# Patient Record
Sex: MALE | Race: BLACK OR AFRICAN AMERICAN | NOT HISPANIC OR LATINO
[De-identification: names, ages, dates, MRNs, and addresses within clinical notes are randomized per-mention and may not be internally consistent; named-entity substitution may affect disease eponyms.]

---

## 2017-01-09 ENCOUNTER — OTHER (OUTPATIENT)
Age: 82
End: 2017-01-09

## 2017-01-19 ENCOUNTER — APPOINTMENT (OUTPATIENT)
Dept: HEART AND VASCULAR | Facility: CLINIC | Age: 82
End: 2017-01-19

## 2017-01-19 VITALS
SYSTOLIC BLOOD PRESSURE: 120 MMHG | HEIGHT: 73 IN | WEIGHT: 184 LBS | DIASTOLIC BLOOD PRESSURE: 72 MMHG | BODY MASS INDEX: 24.39 KG/M2 | HEART RATE: 60 BPM

## 2017-01-20 ENCOUNTER — APPOINTMENT (OUTPATIENT)
Dept: HEART AND VASCULAR | Facility: CLINIC | Age: 82
End: 2017-01-20

## 2017-01-20 VITALS
HEART RATE: 60 BPM | WEIGHT: 182 LBS | DIASTOLIC BLOOD PRESSURE: 77 MMHG | BODY MASS INDEX: 24.12 KG/M2 | HEIGHT: 73 IN | SYSTOLIC BLOOD PRESSURE: 134 MMHG

## 2017-01-20 LAB
ALBUMIN SERPL ELPH-MCNC: 4.4 G/DL
ALP BLD-CCNC: 80 U/L
ALT SERPL-CCNC: 15 U/L
ANION GAP SERPL CALC-SCNC: 13 MMOL/L
AST SERPL-CCNC: 26 U/L
BASOPHILS # BLD AUTO: 0.01 K/UL
BASOPHILS NFR BLD AUTO: 0.2 %
BILIRUB SERPL-MCNC: 0.8 MG/DL
BUN SERPL-MCNC: 19 MG/DL
CALCIUM SERPL-MCNC: 9.6 MG/DL
CHLORIDE SERPL-SCNC: 101 MMOL/L
CHOLEST SERPL-MCNC: 229 MG/DL
CHOLEST/HDLC SERPL: 3.1 RATIO
CO2 SERPL-SCNC: 27 MMOL/L
CREAT SERPL-MCNC: 1.44 MG/DL
EOSINOPHIL # BLD AUTO: 0.06 K/UL
EOSINOPHIL NFR BLD AUTO: 1 %
GLUCOSE SERPL-MCNC: 89 MG/DL
HCT VFR BLD CALC: 39.6 %
HDLC SERPL-MCNC: 75 MG/DL
HGB BLD-MCNC: 12.9 G/DL
IMM GRANULOCYTES NFR BLD AUTO: 0.2 %
INR PPP: 2.56 RATIO
LDLC SERPL CALC-MCNC: 131 MG/DL
LYMPHOCYTES # BLD AUTO: 1.66 K/UL
LYMPHOCYTES NFR BLD AUTO: 26.6 %
MAN DIFF?: NORMAL
MCHC RBC-ENTMCNC: 30.9 PG
MCHC RBC-ENTMCNC: 32.6 GM/DL
MCV RBC AUTO: 94.7 FL
MONOCYTES # BLD AUTO: 0.37 K/UL
MONOCYTES NFR BLD AUTO: 5.9 %
NEUTROPHILS # BLD AUTO: 4.13 K/UL
NEUTROPHILS NFR BLD AUTO: 66.1 %
PLATELET # BLD AUTO: 238 K/UL
POTASSIUM SERPL-SCNC: 4.9 MMOL/L
PROT SERPL-MCNC: 8.5 G/DL
PT BLD: 29.2 SEC
RBC # BLD: 4.18 M/UL
RBC # FLD: 14.2 %
SODIUM SERPL-SCNC: 141 MMOL/L
TRIGL SERPL-MCNC: 114 MG/DL
TSH SERPL-ACNC: 2.52 UIU/ML
WBC # FLD AUTO: 6.24 K/UL

## 2017-01-22 LAB — HBA1C MFR BLD HPLC: 5.7 %

## 2017-01-27 ENCOUNTER — OTHER (OUTPATIENT)
Age: 82
End: 2017-01-27

## 2017-06-07 ENCOUNTER — APPOINTMENT (OUTPATIENT)
Dept: ORTHOPEDIC SURGERY | Facility: CLINIC | Age: 82
End: 2017-06-07

## 2017-06-07 VITALS — HEIGHT: 73 IN | BODY MASS INDEX: 24.12 KG/M2 | WEIGHT: 182 LBS

## 2017-06-07 DIAGNOSIS — M25.471 EFFUSION, RIGHT ANKLE: ICD-10-CM

## 2017-06-08 ENCOUNTER — APPOINTMENT (OUTPATIENT)
Dept: HEART AND VASCULAR | Facility: CLINIC | Age: 82
End: 2017-06-08

## 2017-06-08 VITALS
HEIGHT: 73 IN | DIASTOLIC BLOOD PRESSURE: 80 MMHG | SYSTOLIC BLOOD PRESSURE: 120 MMHG | BODY MASS INDEX: 23.86 KG/M2 | WEIGHT: 180 LBS | HEART RATE: 59 BPM

## 2017-06-13 ENCOUNTER — APPOINTMENT (OUTPATIENT)
Dept: HEART AND VASCULAR | Facility: CLINIC | Age: 82
End: 2017-06-13

## 2017-06-13 VITALS
HEART RATE: 59 BPM | WEIGHT: 182 LBS | HEIGHT: 73 IN | DIASTOLIC BLOOD PRESSURE: 79 MMHG | BODY MASS INDEX: 24.12 KG/M2 | SYSTOLIC BLOOD PRESSURE: 125 MMHG

## 2017-06-14 ENCOUNTER — OTHER (OUTPATIENT)
Age: 82
End: 2017-06-14

## 2017-10-30 ENCOUNTER — APPOINTMENT (OUTPATIENT)
Dept: HEART AND VASCULAR | Facility: CLINIC | Age: 82
End: 2017-10-30
Payer: MEDICARE

## 2017-10-30 VITALS
SYSTOLIC BLOOD PRESSURE: 128 MMHG | DIASTOLIC BLOOD PRESSURE: 82 MMHG | BODY MASS INDEX: 22.82 KG/M2 | HEART RATE: 59 BPM | WEIGHT: 173 LBS

## 2017-10-30 PROCEDURE — 99213 OFFICE O/P EST LOW 20 MIN: CPT | Mod: 25

## 2017-10-30 PROCEDURE — 93000 ELECTROCARDIOGRAM COMPLETE: CPT

## 2017-10-30 PROCEDURE — G0008: CPT

## 2017-10-30 PROCEDURE — 90662 IIV NO PRSV INCREASED AG IM: CPT

## 2017-10-30 PROCEDURE — 36415 COLL VENOUS BLD VENIPUNCTURE: CPT

## 2017-10-30 RX ORDER — SULFAMETHOXAZOLE AND TRIMETHOPRIM 800; 160 MG/1; MG/1
800-160 TABLET ORAL
Qty: 14 | Refills: 0 | Status: DISCONTINUED | COMMUNITY
Start: 2017-05-10

## 2017-10-30 RX ORDER — PREDNISONE 20 MG/1
20 TABLET ORAL
Qty: 6 | Refills: 0 | Status: DISCONTINUED | COMMUNITY
Start: 2017-05-14

## 2017-10-31 ENCOUNTER — OTHER (OUTPATIENT)
Age: 82
End: 2017-10-31

## 2017-10-31 LAB
ANION GAP SERPL CALC-SCNC: 17 MMOL/L
BASOPHILS # BLD AUTO: 0.02 K/UL
BASOPHILS NFR BLD AUTO: 0.4 %
BUN SERPL-MCNC: 24 MG/DL
CALCIUM SERPL-MCNC: 9.5 MG/DL
CHLORIDE SERPL-SCNC: 103 MMOL/L
CHOLEST SERPL-MCNC: 220 MG/DL
CHOLEST/HDLC SERPL: 2.8 RATIO
CO2 SERPL-SCNC: 24 MMOL/L
CREAT SERPL-MCNC: 1.55 MG/DL
EOSINOPHIL # BLD AUTO: 0.11 K/UL
EOSINOPHIL NFR BLD AUTO: 2.1 %
GLUCOSE SERPL-MCNC: 112 MG/DL
HBA1C MFR BLD HPLC: 5.6 %
HCT VFR BLD CALC: 40 %
HDLC SERPL-MCNC: 78 MG/DL
HGB BLD-MCNC: 12.6 G/DL
IMM GRANULOCYTES NFR BLD AUTO: 0 %
INR PPP: 1.96 RATIO
LDLC SERPL CALC-MCNC: 123 MG/DL
LYMPHOCYTES # BLD AUTO: 1.24 K/UL
LYMPHOCYTES NFR BLD AUTO: 23.8 %
MAN DIFF?: NORMAL
MCHC RBC-ENTMCNC: 30.1 PG
MCHC RBC-ENTMCNC: 31.5 GM/DL
MCV RBC AUTO: 95.5 FL
MONOCYTES # BLD AUTO: 0.4 K/UL
MONOCYTES NFR BLD AUTO: 7.7 %
NEUTROPHILS # BLD AUTO: 3.44 K/UL
NEUTROPHILS NFR BLD AUTO: 66 %
PLATELET # BLD AUTO: 207 K/UL
POTASSIUM SERPL-SCNC: 4.4 MMOL/L
PT BLD: 22.5 SEC
RBC # BLD: 4.19 M/UL
RBC # FLD: 15.1 %
SODIUM SERPL-SCNC: 144 MMOL/L
TRIGL SERPL-MCNC: 97 MG/DL
TSH SERPL-ACNC: 2.73 UIU/ML
WBC # FLD AUTO: 5.21 K/UL

## 2017-12-12 ENCOUNTER — APPOINTMENT (OUTPATIENT)
Dept: HEART AND VASCULAR | Facility: CLINIC | Age: 82
End: 2017-12-12
Payer: MEDICARE

## 2017-12-12 VITALS
HEART RATE: 60 BPM | WEIGHT: 175 LBS | BODY MASS INDEX: 23.19 KG/M2 | HEIGHT: 73 IN | SYSTOLIC BLOOD PRESSURE: 131 MMHG | DIASTOLIC BLOOD PRESSURE: 76 MMHG

## 2017-12-12 PROCEDURE — 93280 PM DEVICE PROGR EVAL DUAL: CPT

## 2017-12-12 PROCEDURE — 99214 OFFICE O/P EST MOD 30 MIN: CPT | Mod: 25

## 2018-01-17 ENCOUNTER — APPOINTMENT (OUTPATIENT)
Dept: HEART AND VASCULAR | Facility: CLINIC | Age: 83
End: 2018-01-17
Payer: MEDICARE

## 2018-01-17 PROCEDURE — 36415 COLL VENOUS BLD VENIPUNCTURE: CPT

## 2018-01-18 LAB
INR PPP: 2.95 RATIO
PT BLD: 34.1 SEC

## 2018-03-06 ENCOUNTER — APPOINTMENT (OUTPATIENT)
Dept: HEART AND VASCULAR | Facility: CLINIC | Age: 83
End: 2018-03-06
Payer: MEDICARE

## 2018-03-06 VITALS
SYSTOLIC BLOOD PRESSURE: 122 MMHG | DIASTOLIC BLOOD PRESSURE: 90 MMHG | WEIGHT: 180 LBS | HEIGHT: 73 IN | HEART RATE: 60 BPM | BODY MASS INDEX: 23.86 KG/M2

## 2018-03-06 VITALS — SYSTOLIC BLOOD PRESSURE: 132 MMHG | DIASTOLIC BLOOD PRESSURE: 84 MMHG

## 2018-03-06 PROCEDURE — 93000 ELECTROCARDIOGRAM COMPLETE: CPT

## 2018-03-06 PROCEDURE — 93978 VASCULAR STUDY: CPT

## 2018-03-06 PROCEDURE — 99214 OFFICE O/P EST MOD 30 MIN: CPT | Mod: 25

## 2018-03-06 PROCEDURE — 36415 COLL VENOUS BLD VENIPUNCTURE: CPT

## 2018-03-08 LAB
INR PPP: 2.12 RATIO
PT BLD: 24.3 SEC

## 2018-05-11 ENCOUNTER — APPOINTMENT (OUTPATIENT)
Dept: HEART AND VASCULAR | Facility: CLINIC | Age: 83
End: 2018-05-11
Payer: MEDICARE

## 2018-05-11 PROCEDURE — 36415 COLL VENOUS BLD VENIPUNCTURE: CPT

## 2018-05-14 LAB
INR PPP: 2.52 RATIO
PT BLD: 29 SEC

## 2018-05-15 ENCOUNTER — APPOINTMENT (OUTPATIENT)
Dept: HEART AND VASCULAR | Facility: CLINIC | Age: 83
End: 2018-05-15
Payer: MEDICARE

## 2018-05-15 ENCOUNTER — LABORATORY RESULT (OUTPATIENT)
Age: 83
End: 2018-05-15

## 2018-05-15 VITALS
SYSTOLIC BLOOD PRESSURE: 132 MMHG | HEIGHT: 72 IN | WEIGHT: 175 LBS | BODY MASS INDEX: 23.7 KG/M2 | HEART RATE: 54 BPM | DIASTOLIC BLOOD PRESSURE: 72 MMHG

## 2018-05-15 DIAGNOSIS — Z01.818 ENCOUNTER FOR OTHER PREPROCEDURAL EXAMINATION: ICD-10-CM

## 2018-05-15 LAB
BASOPHILS # BLD AUTO: 0.02 K/UL
BASOPHILS NFR BLD AUTO: 0.4 %
EOSINOPHIL # BLD AUTO: 0.06 K/UL
EOSINOPHIL NFR BLD AUTO: 1.2 %
HCT VFR BLD CALC: 38.1 %
HGB BLD-MCNC: 12.3 G/DL
IMM GRANULOCYTES NFR BLD AUTO: 0.2 %
LYMPHOCYTES # BLD AUTO: 1.45 K/UL
LYMPHOCYTES NFR BLD AUTO: 28.9 %
MAN DIFF?: NORMAL
MCHC RBC-ENTMCNC: 30.2 PG
MCHC RBC-ENTMCNC: 32.3 GM/DL
MCV RBC AUTO: 93.6 FL
MONOCYTES # BLD AUTO: 0.47 K/UL
MONOCYTES NFR BLD AUTO: 9.4 %
NEUTROPHILS # BLD AUTO: 3.01 K/UL
NEUTROPHILS NFR BLD AUTO: 59.9 %
PLATELET # BLD AUTO: 196 K/UL
RBC # BLD: 4.07 M/UL
RBC # FLD: 14.4 %
WBC # FLD AUTO: 5.02 K/UL

## 2018-05-15 PROCEDURE — 99215 OFFICE O/P EST HI 40 MIN: CPT | Mod: 25

## 2018-05-15 PROCEDURE — 93280 PM DEVICE PROGR EVAL DUAL: CPT

## 2018-05-16 ENCOUNTER — OUTPATIENT (OUTPATIENT)
Dept: OUTPATIENT SERVICES | Facility: HOSPITAL | Age: 83
LOS: 1 days | Discharge: HOME | End: 2018-05-16
Payer: MEDICARE

## 2018-05-16 LAB
INR BLD: 2.87 — HIGH (ref 0.88–1.16)
PROTHROM AB SERPL-ACNC: 32.6 SEC — HIGH (ref 9.8–12.7)

## 2018-05-16 PROCEDURE — C1785: CPT

## 2018-05-16 PROCEDURE — 33228 REMV&REPLC PM GEN DUAL LEAD: CPT | Mod: KX

## 2018-05-16 PROCEDURE — 36415 COLL VENOUS BLD VENIPUNCTURE: CPT

## 2018-05-16 PROCEDURE — 33228 REMV&REPLC PM GEN DUAL LEAD: CPT

## 2018-05-16 PROCEDURE — 85610 PROTHROMBIN TIME: CPT

## 2018-05-16 RX ORDER — CEFAZOLIN SODIUM 1 G
1000 VIAL (EA) INJECTION ONCE
Qty: 0 | Refills: 0 | Status: COMPLETED | OUTPATIENT
Start: 2018-05-16 | End: 2018-05-16

## 2018-05-16 RX ADMIN — Medication 100 MILLIGRAM(S): at 13:10

## 2018-05-17 ENCOUNTER — APPOINTMENT (OUTPATIENT)
Dept: HEART AND VASCULAR | Facility: CLINIC | Age: 83
End: 2018-05-17

## 2018-06-19 ENCOUNTER — APPOINTMENT (OUTPATIENT)
Dept: HEART AND VASCULAR | Facility: CLINIC | Age: 83
End: 2018-06-19
Payer: MEDICARE

## 2018-06-19 VITALS
BODY MASS INDEX: 23.7 KG/M2 | HEIGHT: 72 IN | DIASTOLIC BLOOD PRESSURE: 91 MMHG | HEART RATE: 60 BPM | SYSTOLIC BLOOD PRESSURE: 163 MMHG | WEIGHT: 175 LBS

## 2018-06-19 PROCEDURE — 93280 PM DEVICE PROGR EVAL DUAL: CPT

## 2018-06-22 ENCOUNTER — APPOINTMENT (OUTPATIENT)
Dept: HEART AND VASCULAR | Facility: CLINIC | Age: 83
End: 2018-06-22

## 2018-07-24 ENCOUNTER — APPOINTMENT (OUTPATIENT)
Dept: HEART AND VASCULAR | Facility: CLINIC | Age: 83
End: 2018-07-24

## 2018-09-24 ENCOUNTER — RX RENEWAL (OUTPATIENT)
Age: 83
End: 2018-09-24

## 2018-11-15 ENCOUNTER — APPOINTMENT (OUTPATIENT)
Dept: HEART AND VASCULAR | Facility: CLINIC | Age: 83
End: 2018-11-15
Payer: MEDICARE

## 2018-11-15 ENCOUNTER — NON-APPOINTMENT (OUTPATIENT)
Age: 83
End: 2018-11-15

## 2018-11-15 VITALS
HEIGHT: 72 IN | WEIGHT: 169 LBS | DIASTOLIC BLOOD PRESSURE: 88 MMHG | BODY MASS INDEX: 22.89 KG/M2 | SYSTOLIC BLOOD PRESSURE: 144 MMHG

## 2018-11-15 VITALS — HEART RATE: 60 BPM

## 2018-11-15 DIAGNOSIS — R40.4 TRANSIENT ALTERATION OF AWARENESS: ICD-10-CM

## 2018-11-15 DIAGNOSIS — K52.9 NONINFECTIVE GASTROENTERITIS AND COLITIS, UNSPECIFIED: ICD-10-CM

## 2018-11-15 PROCEDURE — 93306 TTE W/DOPPLER COMPLETE: CPT

## 2018-11-15 PROCEDURE — 36415 COLL VENOUS BLD VENIPUNCTURE: CPT

## 2018-11-15 PROCEDURE — 99214 OFFICE O/P EST MOD 30 MIN: CPT | Mod: 25

## 2018-11-15 PROCEDURE — 93000 ELECTROCARDIOGRAM COMPLETE: CPT

## 2018-11-18 PROBLEM — R40.4 ALTERED AWARENESS, TRANSIENT: Status: ACTIVE | Noted: 2018-11-18

## 2018-11-18 NOTE — PHYSICAL EXAM
[General Appearance - Well Developed] : well developed [Normal Appearance] : normal appearance [Well Groomed] : well groomed [General Appearance - Well Nourished] : well nourished [No Deformities] : no deformities [General Appearance - In No Acute Distress] : no acute distress [Normal Conjunctiva] : the conjunctiva exhibited no abnormalities [Eyelids - No Xanthelasma] : the eyelids demonstrated no xanthelasmas [Normal Oral Mucosa] : normal oral mucosa [No Oral Pallor] : no oral pallor [No Oral Cyanosis] : no oral cyanosis [Normal Jugular Venous A Waves Present] : normal jugular venous A waves present [Normal Jugular Venous V Waves Present] : normal jugular venous V waves present [No Jugular Venous Jensen A Waves] : no jugular venous jensen A waves [Respiration, Rhythm And Depth] : normal respiratory rhythm and effort [Exaggerated Use Of Accessory Muscles For Inspiration] : no accessory muscle use [Auscultation Breath Sounds / Voice Sounds] : lungs were clear to auscultation bilaterally [Heart Rate And Rhythm] : heart rate and rhythm were normal [Heart Sounds] : normal S1 and S2 [Arterial Pulses Normal] : the arterial pulses were normal [Abdomen Soft] : soft [Abdomen Tenderness] : non-tender [Abdomen Mass (___ Cm)] : no abdominal mass palpated [Abnormal Walk] : normal gait [Gait - Sufficient For Exercise Testing] : the gait was sufficient for exercise testing [Nail Clubbing] : no clubbing of the fingernails [Cyanosis, Localized] : no localized cyanosis [Petechial Hemorrhages (___cm)] : no petechial hemorrhages [Skin Color & Pigmentation] : normal skin color and pigmentation [] : no rash [No Venous Stasis] : no venous stasis [No Xanthoma] : no  xanthoma was observed [FreeTextEntry1] : One times 1 abrasion on right shin [Oriented To Time, Place, And Person] : oriented to person, place, and time [Affect] : the affect was normal [Mood] : the mood was normal [No Anxiety] : not feeling anxious

## 2018-11-18 NOTE — DISCUSSION/SUMMARY
[FreeTextEntry1] : The patient has atrial fibrillation. His heart rate is controlled. He is anticoagulated. He has occasional lightheadedness associated with a bowel movement. This is not cardiac. His blood pressure has been elevated. He may be having a reaction to lisinopril. He will change and take Avapro. His pacemaker was replaced and is functioning normally.

## 2018-11-18 NOTE — HISTORY OF PRESENT ILLNESS
[FreeTextEntry1] : The patient has dizziness before moving his bowels. His occasional mild and has occurred for the last 6 days. He has 3 bowel movements a day but no diarrhea. It is light brown in color. He has not had a fever. Since May he clears his throat of yellow phlegm in the morning. This is better with an antihistamine. His pacemaker was replaced.

## 2018-11-20 LAB
ANION GAP SERPL CALC-SCNC: 12 MMOL/L
BASOPHILS # BLD AUTO: 0.02 K/UL
BASOPHILS NFR BLD AUTO: 0.4 %
BUN SERPL-MCNC: 23 MG/DL
CALCIUM SERPL-MCNC: 9.8 MG/DL
CHLORIDE SERPL-SCNC: 103 MMOL/L
CHOLEST SERPL-MCNC: 234 MG/DL
CHOLEST/HDLC SERPL: 2.9 RATIO
CO2 SERPL-SCNC: 25 MMOL/L
CREAT SERPL-MCNC: 1.43 MG/DL
EOSINOPHIL # BLD AUTO: 0.08 K/UL
EOSINOPHIL NFR BLD AUTO: 1.5 %
GLUCOSE SERPL-MCNC: 89 MG/DL
HBA1C MFR BLD HPLC: 5.6 %
HCT VFR BLD CALC: 41.8 %
HDLC SERPL-MCNC: 81 MG/DL
HGB BLD-MCNC: 13.1 G/DL
IMM GRANULOCYTES NFR BLD AUTO: 0.4 %
INR PPP: 2.49 RATIO
LDLC SERPL CALC-MCNC: 137 MG/DL
LYMPHOCYTES # BLD AUTO: 1.59 K/UL
LYMPHOCYTES NFR BLD AUTO: 29 %
MAN DIFF?: NORMAL
MCHC RBC-ENTMCNC: 30.3 PG
MCHC RBC-ENTMCNC: 31.3 GM/DL
MCV RBC AUTO: 96.5 FL
MONOCYTES # BLD AUTO: 0.38 K/UL
MONOCYTES NFR BLD AUTO: 6.9 %
NEUTROPHILS # BLD AUTO: 3.39 K/UL
NEUTROPHILS NFR BLD AUTO: 61.8 %
PLATELET # BLD AUTO: 219 K/UL
POTASSIUM SERPL-SCNC: 5 MMOL/L
PT BLD: 28.7 SEC
RBC # BLD: 4.33 M/UL
RBC # FLD: 14.2 %
SODIUM SERPL-SCNC: 140 MMOL/L
TRIGL SERPL-MCNC: 81 MG/DL
TSH SERPL-ACNC: 3.05 UIU/ML
WBC # FLD AUTO: 5.48 K/UL

## 2018-12-04 ENCOUNTER — APPOINTMENT (OUTPATIENT)
Dept: HEART AND VASCULAR | Facility: CLINIC | Age: 83
End: 2018-12-04
Payer: MEDICARE

## 2018-12-04 VITALS
WEIGHT: 180 LBS | HEART RATE: 60 BPM | BODY MASS INDEX: 24.38 KG/M2 | HEIGHT: 72 IN | DIASTOLIC BLOOD PRESSURE: 65 MMHG | SYSTOLIC BLOOD PRESSURE: 117 MMHG

## 2018-12-04 PROCEDURE — 93280 PM DEVICE PROGR EVAL DUAL: CPT

## 2018-12-04 PROCEDURE — 99213 OFFICE O/P EST LOW 20 MIN: CPT | Mod: 25

## 2018-12-06 NOTE — HISTORY OF PRESENT ILLNESS
[None] : The patient complains of no symptoms [Palpitations] : no palpitations [SOB] : no dyspnea [Syncope] : no syncope [Dizziness] : no dizziness [Chest Pain] : no chest pain or discomfort [Shoulder Pain] : no shoulder pain [Pain at Site] : no pain at device site [Erythema at Site] : no erythema at device site [Swelling at Site] : no swelling at device site [de-identified] : 82 yo man with permanent Afib (on Coumadin), HTN, CKD and AV block S/P PPM (original implant 2000, generator change 2/9/09), presents for device follow-up after his second generator change on 5/16/18. He denies device related complaints. He has been pacer dependent in that he paces 100% of the time but there is a slow ventricular escape in the 40's with inhibition. EF has been preserved.  \par \par INRs managed by Dr. Minaya.

## 2018-12-06 NOTE — PHYSICAL EXAM
[General Appearance - Well Developed] : well developed [Normal Appearance] : normal appearance [Well Groomed] : well groomed [General Appearance - Well Nourished] : well nourished [No Deformities] : no deformities [General Appearance - In No Acute Distress] : no acute distress [Heart Rate And Rhythm] : heart rate and rhythm were normal [Heart Sounds] : normal S1 and S2 [Murmurs] : no murmurs present [Respiration, Rhythm And Depth] : normal respiratory rhythm and effort [Exaggerated Use Of Accessory Muscles For Inspiration] : no accessory muscle use [Auscultation Breath Sounds / Voice Sounds] : lungs were clear to auscultation bilaterally [Left Infraclavicular] : left infraclavicular area [Clean] : clean [Dry] : dry [Well-Healed] : well-healed [Abdomen Soft] : soft [Abdomen Tenderness] : non-tender [Abdomen Mass (___ Cm)] : no abdominal mass palpated [Nail Clubbing] : no clubbing of the fingernails [Cyanosis, Localized] : no localized cyanosis [Petechial Hemorrhages (___cm)] : no petechial hemorrhages [] : no ischemic changes [Palpable Crepitus] : no palpable crepitus [Bleeding] : no active bleeding [Foul Odor] : no foul smell [Purulent Drainage] : no purulent drainage [Serosanguineous Drainage] : no serosanquineous drainage [Serous Drainage] : no serous drainage [Erythema] : not erythematous [Warm] : not warm [Tender] : not tender [Indurated] : not indurated [Fluctuant] : not fluctuant

## 2018-12-06 NOTE — PROCEDURE
[Atrial Fibrillation] : atrial fibrillation [Pacemaker] : pacemaker [[employer]] : [employer] [DDI] : DDI [Voltage: ___ volts] : Voltage was [unfilled] volts [Impedance: ___ Ohms] : current cell impedance is [unfilled] Ohms [Threshold Testing Performed] : Threshold testing was performed [Lead Imp:  ___ohms] : lead impedance was [unfilled] ohms [Sensing Amplitude ___mv] : sensing amplitude was [unfilled] mv [___V @] : [unfilled] V [___ ms] : [unfilled] ms [None] : none [de-identified] : complete heart block with ventricular escape ~ 35 bpm [de-identified] : KOSTA [de-identified] : Assurity MRI [de-identified] : [id number] [de-identified] : 5/16/2018 [de-identified] : 61 [de-identified] : 9.6-10.1 years longevity  [de-identified] : >99% AT/AF\par  97%

## 2019-01-17 ENCOUNTER — APPOINTMENT (OUTPATIENT)
Dept: HEART AND VASCULAR | Facility: CLINIC | Age: 84
End: 2019-01-17
Payer: MEDICARE

## 2019-01-17 PROCEDURE — 36415 COLL VENOUS BLD VENIPUNCTURE: CPT

## 2019-01-18 LAB
INR PPP: 3.12 RATIO
PT BLD: 36.2 SEC

## 2019-04-19 ENCOUNTER — APPOINTMENT (OUTPATIENT)
Dept: INTERNAL MEDICINE | Facility: CLINIC | Age: 84
End: 2019-04-19

## 2019-05-02 ENCOUNTER — APPOINTMENT (OUTPATIENT)
Dept: HEART AND VASCULAR | Facility: CLINIC | Age: 84
End: 2019-05-02
Payer: MEDICARE

## 2019-05-02 ENCOUNTER — NON-APPOINTMENT (OUTPATIENT)
Age: 84
End: 2019-05-02

## 2019-05-02 VITALS
BODY MASS INDEX: 24.65 KG/M2 | OXYGEN SATURATION: 96 % | DIASTOLIC BLOOD PRESSURE: 80 MMHG | WEIGHT: 182 LBS | SYSTOLIC BLOOD PRESSURE: 112 MMHG | HEIGHT: 72 IN | HEART RATE: 66 BPM

## 2019-05-02 DIAGNOSIS — R09.89 OTHER SPECIFIED SYMPTOMS AND SIGNS INVOLVING THE CIRCULATORY AND RESPIRATORY SYSTEMS: ICD-10-CM

## 2019-05-02 PROCEDURE — 93000 ELECTROCARDIOGRAM COMPLETE: CPT

## 2019-05-02 PROCEDURE — 99214 OFFICE O/P EST MOD 30 MIN: CPT | Mod: 25

## 2019-05-02 PROCEDURE — 93880 EXTRACRANIAL BILAT STUDY: CPT

## 2019-05-02 PROCEDURE — 36415 COLL VENOUS BLD VENIPUNCTURE: CPT

## 2019-05-02 RX ORDER — AZITHROMYCIN 500 MG/1
500 TABLET, FILM COATED ORAL DAILY
Qty: 3 | Refills: 0 | Status: DISCONTINUED | COMMUNITY
Start: 2018-11-15 | End: 2019-05-02

## 2019-05-03 ENCOUNTER — RX RENEWAL (OUTPATIENT)
Age: 84
End: 2019-05-03

## 2019-05-03 ENCOUNTER — RESULT REVIEW (OUTPATIENT)
Age: 84
End: 2019-05-03

## 2019-05-03 LAB
INR PPP: 2.39 RATIO
PT BLD: 27.7 SEC

## 2019-05-03 NOTE — HISTORY OF PRESENT ILLNESS
[FreeTextEntry1] : The patient [place] subway [place]. This mild occasional and resolves with rest. He walks on the treadmill slowly to 30 minutes. He walks a mile outdoors without difficulty. He has epigastric discomfort 15 minutes after eating.

## 2019-05-03 NOTE — PHYSICAL EXAM
[General Appearance - Well Developed] : well developed [Well Groomed] : well groomed [Normal Appearance] : normal appearance [General Appearance - Well Nourished] : well nourished [No Deformities] : no deformities [General Appearance - In No Acute Distress] : no acute distress [Normal Conjunctiva] : the conjunctiva exhibited no abnormalities [Eyelids - No Xanthelasma] : the eyelids demonstrated no xanthelasmas [Normal Oral Mucosa] : normal oral mucosa [No Oral Pallor] : no oral pallor [No Oral Cyanosis] : no oral cyanosis [Normal Jugular Venous V Waves Present] : normal jugular venous V waves present [Normal Jugular Venous A Waves Present] : normal jugular venous A waves present [No Jugular Venous Jensen A Waves] : no jugular venous jensen A waves [Respiration, Rhythm And Depth] : normal respiratory rhythm and effort [Exaggerated Use Of Accessory Muscles For Inspiration] : no accessory muscle use [Auscultation Breath Sounds / Voice Sounds] : lungs were clear to auscultation bilaterally [Heart Rate And Rhythm] : heart rate and rhythm were normal [Heart Sounds] : normal S1 and S2 [Arterial Pulses Normal] : the arterial pulses were normal [FreeTextEntry1] : 2/6 Systolic ejection murmur, harsh/blowing base to apex. right carotid bruit versus murmur. [Abdomen Soft] : soft [Abdomen Tenderness] : non-tender [Abdomen Mass (___ Cm)] : no abdominal mass palpated [Abnormal Walk] : normal gait [Gait - Sufficient For Exercise Testing] : the gait was sufficient for exercise testing [Nail Clubbing] : no clubbing of the fingernails [Cyanosis, Localized] : no localized cyanosis [Petechial Hemorrhages (___cm)] : no petechial hemorrhages [Skin Color & Pigmentation] : normal skin color and pigmentation [] : no rash [No Venous Stasis] : no venous stasis [No Xanthoma] : no  xanthoma was observed [Oriented To Time, Place, And Person] : oriented to person, place, and time [Affect] : the affect was normal [Mood] : the mood was normal [No Anxiety] : not feeling anxious

## 2019-05-03 NOTE — DISCUSSION/SUMMARY
[FreeTextEntry1] : The patient has mild dyspnea on exertion. His EKG shows atrial fibrillation and ventricular pacing. He has a carotid bruit. His carotid Doppler is normal. His prior studies did not show significant structural heart disease. The patient is deconditioned. He will increase his exercise. He will continue his current medication.

## 2019-06-04 ENCOUNTER — APPOINTMENT (OUTPATIENT)
Dept: HEART AND VASCULAR | Facility: CLINIC | Age: 84
End: 2019-06-04

## 2019-06-04 ENCOUNTER — RECORD ABSTRACTING (OUTPATIENT)
Age: 84
End: 2019-06-04

## 2019-06-04 VITALS — DIASTOLIC BLOOD PRESSURE: 100 MMHG | SYSTOLIC BLOOD PRESSURE: 132 MMHG | HEART RATE: 54 BPM

## 2019-07-25 ENCOUNTER — RESULT REVIEW (OUTPATIENT)
Age: 84
End: 2019-07-25

## 2019-08-06 ENCOUNTER — APPOINTMENT (OUTPATIENT)
Dept: HEART AND VASCULAR | Facility: CLINIC | Age: 84
End: 2019-08-06
Payer: MEDICARE

## 2019-08-06 VITALS
HEART RATE: 65 BPM | HEIGHT: 72 IN | WEIGHT: 179 LBS | SYSTOLIC BLOOD PRESSURE: 150 MMHG | BODY MASS INDEX: 24.24 KG/M2 | DIASTOLIC BLOOD PRESSURE: 80 MMHG

## 2019-08-06 DIAGNOSIS — I47.2 VENTRICULAR TACHYCARDIA: ICD-10-CM

## 2019-08-06 PROCEDURE — 93280 PM DEVICE PROGR EVAL DUAL: CPT

## 2019-08-06 PROCEDURE — 99213 OFFICE O/P EST LOW 20 MIN: CPT | Mod: 25

## 2019-08-09 PROBLEM — I47.2 NSVT (NONSUSTAINED VENTRICULAR TACHYCARDIA): Status: ACTIVE | Noted: 2019-08-09

## 2019-08-09 NOTE — PHYSICAL EXAM
[General Appearance - Well Developed] : well developed [Well Groomed] : well groomed [Normal Appearance] : normal appearance [General Appearance - Well Nourished] : well nourished [No Deformities] : no deformities [General Appearance - In No Acute Distress] : no acute distress [Heart Rate And Rhythm] : heart rate and rhythm were normal [Heart Sounds] : normal S1 and S2 [Murmurs] : no murmurs present [Respiration, Rhythm And Depth] : normal respiratory rhythm and effort [Exaggerated Use Of Accessory Muscles For Inspiration] : no accessory muscle use [Auscultation Breath Sounds / Voice Sounds] : lungs were clear to auscultation bilaterally [Left Infraclavicular] : left infraclavicular area [Clean] : clean [Dry] : dry [Well-Healed] : well-healed [Abdomen Soft] : soft [Abdomen Tenderness] : non-tender [Abdomen Mass (___ Cm)] : no abdominal mass palpated [Nail Clubbing] : no clubbing of the fingernails [Petechial Hemorrhages (___cm)] : no petechial hemorrhages [Cyanosis, Localized] : no localized cyanosis [] : no ischemic changes [Palpable Crepitus] : no palpable crepitus [Bleeding] : no active bleeding [Foul Odor] : no foul smell [Purulent Drainage] : no purulent drainage [Serosanguineous Drainage] : no serosanquineous drainage [Serous Drainage] : no serous drainage [Warm] : not warm [Erythema] : not erythematous [Tender] : not tender [Indurated] : not indurated [Fluctuant] : not fluctuant

## 2019-08-09 NOTE — PROCEDURE
[Atrial Fibrillation] : atrial fibrillation [Pacemaker] : pacemaker [[employer]] : [employer] [DDI] : DDI [Voltage: ___ volts] : Voltage was [unfilled] volts [Impedance: ___ Ohms] : current cell impedance is [unfilled] Ohms [Threshold Testing Performed] : Threshold testing was performed [Lead Imp:  ___ohms] : lead impedance was [unfilled] ohms [Sensing Amplitude ___mv] : sensing amplitude was [unfilled] mv [___V @] : [unfilled] V [___ ms] : [unfilled] ms [None] : none [de-identified] : KOSTA [de-identified] : complete heart block with ventricular escape ~ 35 bpm [de-identified] : [id number] [de-identified] : Assurity MRI [de-identified] : 5/16/2018 [de-identified] : 61 [de-identified] : 9.6-10.1 years longevity  [de-identified] : >99% AT/AF\par  95%\par NSVT 1 event - 3/26/19 - 19 beats @ rate of ~ 190 bpm

## 2019-08-12 ENCOUNTER — RX RENEWAL (OUTPATIENT)
Age: 84
End: 2019-08-12

## 2019-09-11 ENCOUNTER — RX RENEWAL (OUTPATIENT)
Age: 84
End: 2019-09-11

## 2019-09-26 ENCOUNTER — RESULT REVIEW (OUTPATIENT)
Age: 84
End: 2019-09-26

## 2019-11-11 ENCOUNTER — LABORATORY RESULT (OUTPATIENT)
Age: 84
End: 2019-11-11

## 2019-11-11 ENCOUNTER — APPOINTMENT (OUTPATIENT)
Dept: HEART AND VASCULAR | Facility: CLINIC | Age: 84
End: 2019-11-11
Payer: MEDICARE

## 2019-11-11 PROCEDURE — 36415 COLL VENOUS BLD VENIPUNCTURE: CPT

## 2019-11-13 ENCOUNTER — RESULT REVIEW (OUTPATIENT)
Age: 84
End: 2019-11-13

## 2019-11-20 ENCOUNTER — APPOINTMENT (OUTPATIENT)
Dept: HEART AND VASCULAR | Facility: CLINIC | Age: 84
End: 2019-11-20
Payer: MEDICARE

## 2019-11-20 ENCOUNTER — NON-APPOINTMENT (OUTPATIENT)
Age: 84
End: 2019-11-20

## 2019-11-20 VITALS
OXYGEN SATURATION: 96 % | DIASTOLIC BLOOD PRESSURE: 90 MMHG | WEIGHT: 187 LBS | BODY MASS INDEX: 25.33 KG/M2 | HEART RATE: 60 BPM | HEIGHT: 72 IN | SYSTOLIC BLOOD PRESSURE: 124 MMHG

## 2019-11-20 PROCEDURE — 93000 ELECTROCARDIOGRAM COMPLETE: CPT

## 2019-11-20 PROCEDURE — 93306 TTE W/DOPPLER COMPLETE: CPT

## 2019-11-20 PROCEDURE — 99214 OFFICE O/P EST MOD 30 MIN: CPT | Mod: 25

## 2019-11-20 NOTE — PHYSICAL EXAM
[General Appearance - Well Developed] : well developed [Well Groomed] : well groomed [Normal Appearance] : normal appearance [General Appearance - Well Nourished] : well nourished [No Deformities] : no deformities [General Appearance - In No Acute Distress] : no acute distress [Eyelids - No Xanthelasma] : the eyelids demonstrated no xanthelasmas [Normal Conjunctiva] : the conjunctiva exhibited no abnormalities [No Oral Cyanosis] : no oral cyanosis [No Oral Pallor] : no oral pallor [Normal Oral Mucosa] : normal oral mucosa [Normal Jugular Venous V Waves Present] : normal jugular venous V waves present [Normal Jugular Venous A Waves Present] : normal jugular venous A waves present [Respiration, Rhythm And Depth] : normal respiratory rhythm and effort [No Jugular Venous Jensen A Waves] : no jugular venous jensen A waves [Auscultation Breath Sounds / Voice Sounds] : lungs were clear to auscultation bilaterally [Heart Rate And Rhythm] : heart rate and rhythm were normal [Exaggerated Use Of Accessory Muscles For Inspiration] : no accessory muscle use [Heart Sounds] : normal S1 and S2 [Arterial Pulses Normal] : the arterial pulses were normal [FreeTextEntry1] : Reduced S1, 2-3/6 Systolic ejection murmur, harsh/blowing base to apex. right carotid bruit versus murmur. [Abdomen Mass (___ Cm)] : no abdominal mass palpated [Abdomen Soft] : soft [Abdomen Tenderness] : non-tender [Gait - Sufficient For Exercise Testing] : the gait was sufficient for exercise testing [Nail Clubbing] : no clubbing of the fingernails [Abnormal Walk] : normal gait [Cyanosis, Localized] : no localized cyanosis [Petechial Hemorrhages (___cm)] : no petechial hemorrhages [Skin Color & Pigmentation] : normal skin color and pigmentation [No Venous Stasis] : no venous stasis [] : no rash [Oriented To Time, Place, And Person] : oriented to person, place, and time [No Xanthoma] : no  xanthoma was observed [Affect] : the affect was normal [No Anxiety] : not feeling anxious [Mood] : the mood was normal

## 2019-11-20 NOTE — DISCUSSION/SUMMARY
[FreeTextEntry1] : The patient has dyspnea with a fairly high level of exertion for his age. He has no chest discomfort or dizziness. His EKG shows atrial fibrillation and ventricular pacing. He is getting his pacemaker interrogated regularly. The echocardiogram shows moderate aortic stenosis. The patient's blood pressure is slightly elevated but he declines an increase in medication. I will monitor his valve closely. He will return for an echocardiogram in six months. No intervention on the valve is indicated at this time.

## 2019-11-20 NOTE — HISTORY OF PRESENT ILLNESS
[FreeTextEntry1] : The patient walks 2 miles without difficulty. He [place] dysp[place] subway stairs. It is mild occasional and passes with rest. His diet is fair. He has not been dizzy.

## 2020-02-11 ENCOUNTER — APPOINTMENT (OUTPATIENT)
Dept: HEART AND VASCULAR | Facility: CLINIC | Age: 85
End: 2020-02-11
Payer: MEDICARE

## 2020-02-11 VITALS
DIASTOLIC BLOOD PRESSURE: 84 MMHG | SYSTOLIC BLOOD PRESSURE: 126 MMHG | BODY MASS INDEX: 24.52 KG/M2 | WEIGHT: 181 LBS | HEART RATE: 60 BPM | HEIGHT: 72 IN

## 2020-02-11 PROCEDURE — 99214 OFFICE O/P EST MOD 30 MIN: CPT | Mod: 25

## 2020-02-11 PROCEDURE — 93280 PM DEVICE PROGR EVAL DUAL: CPT

## 2020-02-11 NOTE — PHYSICAL EXAM
[General Appearance - Well Developed] : well developed [Normal Appearance] : normal appearance [Well Groomed] : well groomed [No Deformities] : no deformities [General Appearance - Well Nourished] : well nourished [Heart Rate And Rhythm] : heart rate and rhythm were normal [General Appearance - In No Acute Distress] : no acute distress [Murmurs] : no murmurs present [Heart Sounds] : normal S1 and S2 [Respiration, Rhythm And Depth] : normal respiratory rhythm and effort [Exaggerated Use Of Accessory Muscles For Inspiration] : no accessory muscle use [Auscultation Breath Sounds / Voice Sounds] : lungs were clear to auscultation bilaterally [Left Infraclavicular] : left infraclavicular area [Clean] : clean [Well-Healed] : well-healed [Dry] : dry [Abdomen Soft] : soft [Abdomen Tenderness] : non-tender [Abdomen Mass (___ Cm)] : no abdominal mass palpated [Cyanosis, Localized] : no localized cyanosis [Nail Clubbing] : no clubbing of the fingernails [Petechial Hemorrhages (___cm)] : no petechial hemorrhages [] : no ischemic changes [Palpable Crepitus] : no palpable crepitus [Bleeding] : no active bleeding [Foul Odor] : no foul smell [Purulent Drainage] : no purulent drainage [Serosanguineous Drainage] : no serosanquineous drainage [Serous Drainage] : no serous drainage [Erythema] : not erythematous [Warm] : not warm [Tender] : not tender [Indurated] : not indurated [Fluctuant] : not fluctuant

## 2020-02-11 NOTE — PROCEDURE
[Atrial Fibrillation] : atrial fibrillation [Pacemaker] : pacemaker [[employer]] : [employer] [DDI] : DDI [Voltage: ___ volts] : Voltage was [unfilled] volts [Impedance: ___ Ohms] : current cell impedance is [unfilled] Ohms [Threshold Testing Performed] : Threshold testing was performed [Sensing Amplitude ___mv] : sensing amplitude was [unfilled] mv [Lead Imp:  ___ohms] : lead impedance was [unfilled] ohms [___V @] : [unfilled] V [___ ms] : [unfilled] ms [None] : none [de-identified] : complete heart block with ventricular escape ~ 35 bpm [de-identified] : Assurity MRI [de-identified] : KOSTA [de-identified] : [id number] [de-identified] : 61 [de-identified] : 5/16/2018 [de-identified] : 8-10.5 years longevity  [de-identified] : >99% AT/AF\par  95%\par Reprogrammed mode to DDIR\par

## 2020-02-11 NOTE — HISTORY OF PRESENT ILLNESS
[None] : The patient complains of no symptoms [Palpitations] : no palpitations [SOB] : no dyspnea [Syncope] : no syncope [Chest Pain] : no chest pain or discomfort [Dizziness] : no dizziness [Pain at Site] : no pain at device site [Shoulder Pain] : no shoulder pain [de-identified] : 85 yo man with permanent Afib (on Coumadin), HTN, CKD and AV block S/P PPM (original implant 2000, generator change 2/9/09), presents for device follow-up after his second generator change on 5/16/18. He denies device related complaints. He has been pacer dependent in that he paces 100% of the time but there is a slow ventricular escape in the 40's with inhibition. EF has been preserved.  He does note feeling SOB with stair climbing at times.\par \par INRs managed by Dr. Minaya.\par ECHO 11/2019 EF 70%, mild to mod MR, severely dilated LA, mild to mod conc LVH [Swelling at Site] : no swelling at device site [Erythema at Site] : no erythema at device site

## 2020-02-12 ENCOUNTER — RX RENEWAL (OUTPATIENT)
Age: 85
End: 2020-02-12

## 2020-02-24 ENCOUNTER — APPOINTMENT (OUTPATIENT)
Dept: HEART AND VASCULAR | Facility: CLINIC | Age: 85
End: 2020-02-24
Payer: MEDICARE

## 2020-02-24 PROCEDURE — 36415 COLL VENOUS BLD VENIPUNCTURE: CPT

## 2020-02-26 LAB
ANION GAP SERPL CALC-SCNC: 13 MMOL/L
BUN SERPL-MCNC: 21 MG/DL
CALCIUM SERPL-MCNC: 9.6 MG/DL
CHLORIDE SERPL-SCNC: 104 MMOL/L
CHOLEST SERPL-MCNC: 153 MG/DL
CHOLEST/HDLC SERPL: 2.3 RATIO
CO2 SERPL-SCNC: 25 MMOL/L
CREAT SERPL-MCNC: 1.33 MG/DL
GLUCOSE SERPL-MCNC: 164 MG/DL
HDLC SERPL-MCNC: 65 MG/DL
INR PPP: 2.52 RATIO
LDLC SERPL CALC-MCNC: 64 MG/DL
POTASSIUM SERPL-SCNC: 4.5 MMOL/L
PT BLD: 29.6 SEC
SODIUM SERPL-SCNC: 142 MMOL/L
TRIGL SERPL-MCNC: 117 MG/DL
TSH SERPL-ACNC: 3.05 UIU/ML

## 2020-03-22 ENCOUNTER — RX RENEWAL (OUTPATIENT)
Age: 85
End: 2020-03-22

## 2020-05-07 ENCOUNTER — APPOINTMENT (OUTPATIENT)
Dept: HEART AND VASCULAR | Facility: CLINIC | Age: 85
End: 2020-05-07
Payer: MEDICARE

## 2020-05-07 PROCEDURE — 99214 OFFICE O/P EST MOD 30 MIN: CPT

## 2020-05-07 RX ORDER — ALFUZOSIN HYDROCHLORIDE 10 MG/1
10 TABLET, EXTENDED RELEASE ORAL
Refills: 0 | Status: DISCONTINUED | COMMUNITY
End: 2020-05-07

## 2020-05-07 NOTE — DISCUSSION/SUMMARY
[FreeTextEntry1] : The patient had bruising which has resolved. He has dyspnea on moderate exercise. He is deconditioned. The patient is improving with exercise. His palpitations are mild and infrequent. No significant arrhythmia was found when his pacemaker was interrogated.The patient has had an elevated blood pressure. He will increase his metoprolol and take 50 mg daily. After that we will recheck his blood pressure. The patient has a history of a dilated aortic root. He is at increased risk for AAA. He will come in for abdominal ultrasound. The patient will continue his other medication.

## 2020-05-07 NOTE — HISTORY OF PRESENT ILLNESS
[Home] : at home, [unfilled] , at the time of the visit. [Other Location: e.g. Home (Enter Location, City,State)___] : at [unfilled] [Patient] : the patient [Self] : self [FreeTextEntry1] : The patient declined [employer] [facility]. He has been staying home. His pacemaker was interrogated in February. He noticed small bruises on his arms. He also was bleeding from scratches. He describes a change in his warfarin however he is on the same dose as before. The patient walks 20 blocks without difficulty. He stopped on an incline because of dyspnea. He has also had infrequent mild palpitations. He saw Dr. Galvan one month ago and his blood pressure was elevated. His recent blood pressure was 152/86 with a pulse of 60.

## 2020-05-07 NOTE — REVIEW OF SYSTEMS
[Dyspnea on exertion] : dyspnea during exertion [Palpitations] : palpitations [Easy Bleeding] : a tendency for easy bleeding [Negative] : Heme/Lymph

## 2020-05-15 ENCOUNTER — NON-APPOINTMENT (OUTPATIENT)
Age: 85
End: 2020-05-15

## 2020-05-15 ENCOUNTER — APPOINTMENT (OUTPATIENT)
Dept: HEART AND VASCULAR | Facility: CLINIC | Age: 85
End: 2020-05-15
Payer: MEDICARE

## 2020-05-15 VITALS
BODY MASS INDEX: 24.79 KG/M2 | DIASTOLIC BLOOD PRESSURE: 84 MMHG | SYSTOLIC BLOOD PRESSURE: 136 MMHG | HEART RATE: 60 BPM | HEIGHT: 72 IN | WEIGHT: 183 LBS

## 2020-05-15 VITALS — TEMPERATURE: 98.2 F

## 2020-05-15 DIAGNOSIS — I72.3 ANEURYSM OF ILIAC ARTERY: ICD-10-CM

## 2020-05-15 PROCEDURE — 36415 COLL VENOUS BLD VENIPUNCTURE: CPT

## 2020-05-15 PROCEDURE — 93978 VASCULAR STUDY: CPT

## 2020-05-17 LAB
INR PPP: 3.16 RATIO
PT BLD: 37.6 SEC

## 2020-09-22 ENCOUNTER — APPOINTMENT (OUTPATIENT)
Dept: HEART AND VASCULAR | Facility: CLINIC | Age: 85
End: 2020-09-22

## 2020-09-23 ENCOUNTER — LABORATORY RESULT (OUTPATIENT)
Age: 85
End: 2020-09-23

## 2020-09-24 ENCOUNTER — APPOINTMENT (OUTPATIENT)
Dept: HEART AND VASCULAR | Facility: CLINIC | Age: 85
End: 2020-09-24
Payer: MEDICARE

## 2020-09-24 PROCEDURE — 36415 COLL VENOUS BLD VENIPUNCTURE: CPT

## 2020-09-27 ENCOUNTER — RX RENEWAL (OUTPATIENT)
Age: 85
End: 2020-09-27

## 2020-10-12 ENCOUNTER — APPOINTMENT (OUTPATIENT)
Dept: HEART AND VASCULAR | Facility: CLINIC | Age: 85
End: 2020-10-12
Payer: MEDICARE

## 2020-10-12 VITALS — TEMPERATURE: 97.2 F

## 2020-10-12 PROCEDURE — 36415 COLL VENOUS BLD VENIPUNCTURE: CPT

## 2020-10-14 ENCOUNTER — APPOINTMENT (OUTPATIENT)
Dept: HEART AND VASCULAR | Facility: CLINIC | Age: 85
End: 2020-10-14
Payer: MEDICARE

## 2020-10-14 PROCEDURE — 36415 COLL VENOUS BLD VENIPUNCTURE: CPT

## 2020-10-15 LAB
INR PPP: 4.22
PT BLD: 47.3 SEC

## 2020-10-22 ENCOUNTER — APPOINTMENT (OUTPATIENT)
Dept: HEART AND VASCULAR | Facility: CLINIC | Age: 85
End: 2020-10-22
Payer: MEDICARE

## 2020-10-22 PROCEDURE — 36415 COLL VENOUS BLD VENIPUNCTURE: CPT

## 2020-10-23 ENCOUNTER — NON-APPOINTMENT (OUTPATIENT)
Age: 85
End: 2020-10-23

## 2020-10-23 LAB
ANION GAP SERPL CALC-SCNC: 14 MMOL/L
BASOPHILS # BLD AUTO: 0.03 K/UL
BASOPHILS NFR BLD AUTO: 0.5 %
BUN SERPL-MCNC: 17 MG/DL
CALCIUM SERPL-MCNC: 9.4 MG/DL
CHLORIDE SERPL-SCNC: 106 MMOL/L
CHOLEST SERPL-MCNC: 161 MG/DL
CO2 SERPL-SCNC: 23 MMOL/L
CREAT SERPL-MCNC: 1.19 MG/DL
EOSINOPHIL # BLD AUTO: 0.11 K/UL
EOSINOPHIL NFR BLD AUTO: 1.9 %
GLUCOSE SERPL-MCNC: 91 MG/DL
HCT VFR BLD CALC: 41.4 %
HDLC SERPL-MCNC: 66 MG/DL
HGB BLD-MCNC: 12.6 G/DL
IMM GRANULOCYTES NFR BLD AUTO: 0.3 %
INR PPP: 3.98 RATIO
LDLC SERPL CALC-MCNC: 75 MG/DL
LYMPHOCYTES # BLD AUTO: 1.33 K/UL
LYMPHOCYTES NFR BLD AUTO: 22.7 %
MAN DIFF?: NORMAL
MCHC RBC-ENTMCNC: 30.2 PG
MCHC RBC-ENTMCNC: 30.4 GM/DL
MCV RBC AUTO: 99.3 FL
MONOCYTES # BLD AUTO: 0.67 K/UL
MONOCYTES NFR BLD AUTO: 11.4 %
NEUTROPHILS # BLD AUTO: 3.71 K/UL
NEUTROPHILS NFR BLD AUTO: 63.2 %
NONHDLC SERPL-MCNC: 94 MG/DL
PLATELET # BLD AUTO: 185 K/UL
POTASSIUM SERPL-SCNC: 4.4 MMOL/L
PT BLD: 44.3 SEC
RBC # BLD: 4.17 M/UL
RBC # FLD: 14.8 %
SODIUM SERPL-SCNC: 143 MMOL/L
TRIGL SERPL-MCNC: 96 MG/DL
TSH SERPL-ACNC: 2.92 UIU/ML
WBC # FLD AUTO: 5.87 K/UL

## 2020-11-04 ENCOUNTER — APPOINTMENT (OUTPATIENT)
Dept: HEART AND VASCULAR | Facility: CLINIC | Age: 85
End: 2020-11-04
Payer: MEDICARE

## 2020-11-04 VITALS — TEMPERATURE: 97.5 F

## 2020-11-04 PROCEDURE — 36415 COLL VENOUS BLD VENIPUNCTURE: CPT

## 2020-11-06 ENCOUNTER — NON-APPOINTMENT (OUTPATIENT)
Age: 85
End: 2020-11-06

## 2020-11-06 LAB
INR PPP: 2.75 RATIO
PT BLD: 31.2 SEC

## 2020-11-10 ENCOUNTER — NON-APPOINTMENT (OUTPATIENT)
Age: 85
End: 2020-11-10

## 2020-11-10 ENCOUNTER — APPOINTMENT (OUTPATIENT)
Dept: HEART AND VASCULAR | Facility: CLINIC | Age: 85
End: 2020-11-10
Payer: MEDICARE

## 2020-11-10 VITALS
TEMPERATURE: 98.3 F | HEART RATE: 60 BPM | HEIGHT: 72 IN | BODY MASS INDEX: 24.65 KG/M2 | DIASTOLIC BLOOD PRESSURE: 80 MMHG | SYSTOLIC BLOOD PRESSURE: 124 MMHG | WEIGHT: 182 LBS

## 2020-11-10 PROCEDURE — 99214 OFFICE O/P EST MOD 30 MIN: CPT | Mod: 25

## 2020-11-10 PROCEDURE — 93000 ELECTROCARDIOGRAM COMPLETE: CPT

## 2020-11-10 RX ORDER — ALCLOMETASONE DIPROPIONATE 0.5 MG/G
0.05 CREAM TOPICAL
Qty: 45 | Refills: 0 | Status: DISCONTINUED | COMMUNITY
Start: 2020-03-18

## 2020-11-10 NOTE — PHYSICAL EXAM
[General Appearance - Well Developed] : well developed [Normal Appearance] : normal appearance [Well Groomed] : well groomed [General Appearance - Well Nourished] : well nourished [No Deformities] : no deformities [General Appearance - In No Acute Distress] : no acute distress [Normal Conjunctiva] : the conjunctiva exhibited no abnormalities [Eyelids - No Xanthelasma] : the eyelids demonstrated no xanthelasmas [Normal Oral Mucosa] : normal oral mucosa [No Oral Pallor] : no oral pallor [No Oral Cyanosis] : no oral cyanosis [Normal Jugular Venous A Waves Present] : normal jugular venous A waves present [Normal Jugular Venous V Waves Present] : normal jugular venous V waves present [No Jugular Venous Jensen A Waves] : no jugular venous jensen A waves [Respiration, Rhythm And Depth] : normal respiratory rhythm and effort [Exaggerated Use Of Accessory Muscles For Inspiration] : no accessory muscle use [Auscultation Breath Sounds / Voice Sounds] : lungs were clear to auscultation bilaterally [Heart Rate And Rhythm] : heart rate and rhythm were normal [Heart Sounds] : normal S1 and S2 [Arterial Pulses Normal] : the arterial pulses were normal [FreeTextEntry1] : Reduced S1, 2-3/6 Systolic ejection murmur, harsh/blowing base to apex. right carotid bruit versus murmur. [Abdomen Soft] : soft [Abdomen Tenderness] : non-tender [Abdomen Mass (___ Cm)] : no abdominal mass palpated [Abnormal Walk] : normal gait [Gait - Sufficient For Exercise Testing] : the gait was sufficient for exercise testing [Nail Clubbing] : no clubbing of the fingernails [Cyanosis, Localized] : no localized cyanosis [Petechial Hemorrhages (___cm)] : no petechial hemorrhages [Skin Color & Pigmentation] : normal skin color and pigmentation [] : no rash [No Venous Stasis] : no venous stasis [No Xanthoma] : no  xanthoma was observed [Oriented To Time, Place, And Person] : oriented to person, place, and time [Affect] : the affect was normal [Mood] : the mood was normal [No Anxiety] : not feeling anxious

## 2020-11-10 NOTE — HISTORY OF PRESENT ILLNESS
[FreeTextEntry1] : The patient describes pain and swelling in his right ankle. He has had ankle pain the majority of the time since August. His swelling improves in the morning. The pain as a 5/10. The patient walks twice a week one dose 2 miles which causes dyspnea but he does not stop. He denies chest discomfort.

## 2020-11-18 ENCOUNTER — APPOINTMENT (OUTPATIENT)
Dept: HEART AND VASCULAR | Facility: CLINIC | Age: 85
End: 2020-11-18
Payer: MEDICARE

## 2020-11-18 VITALS — TEMPERATURE: 98.3 F

## 2020-11-18 DIAGNOSIS — R60.0 LOCALIZED EDEMA: ICD-10-CM

## 2020-11-18 PROCEDURE — 93306 TTE W/DOPPLER COMPLETE: CPT

## 2020-11-21 PROBLEM — R60.0 EDEMA OF EXTREMITIES: Status: ACTIVE | Noted: 2017-01-22

## 2020-12-15 ENCOUNTER — APPOINTMENT (OUTPATIENT)
Dept: HEART AND VASCULAR | Facility: CLINIC | Age: 85
End: 2020-12-15
Payer: MEDICARE

## 2020-12-15 VITALS
WEIGHT: 182 LBS | BODY MASS INDEX: 24.65 KG/M2 | SYSTOLIC BLOOD PRESSURE: 148 MMHG | TEMPERATURE: 93.8 F | HEART RATE: 68 BPM | DIASTOLIC BLOOD PRESSURE: 82 MMHG | HEIGHT: 72 IN

## 2020-12-15 PROCEDURE — 99213 OFFICE O/P EST LOW 20 MIN: CPT | Mod: 25

## 2020-12-15 PROCEDURE — 93280 PM DEVICE PROGR EVAL DUAL: CPT

## 2020-12-15 NOTE — HISTORY OF PRESENT ILLNESS
[None] : The patient complains of no symptoms [Palpitations] : no palpitations [SOB] : no dyspnea [Syncope] : no syncope [Dizziness] : no dizziness [Chest Pain] : no chest pain or discomfort [Shoulder Pain] : no shoulder pain [Pain at Site] : no pain at device site [Erythema at Site] : no erythema at device site [Swelling at Site] : no swelling at device site [de-identified] : 86 yo man with permanent Afib (on Coumadin), HTN, CKD and AV block S/P PPM (original implant 2000, generator change 2/9/09), presents for device follow-up after his second generator change on 5/16/18. He denies device related complaints. He has been pacer dependent in that he paces 100% of the time but there is a slow ventricular escape in the 40's with inhibition. EF has been preserved.  He does note feeling SOB with stair climbing at times - this is a little better with programming to DDIR at the last visit.  No bleeding issues or other complaints.\par INRs managed by Dr. Minaya.\par ECHO 11/2019 EF 70%, mild to mod MR, severely dilated LA, mild to mod conc LVH

## 2020-12-15 NOTE — PROCEDURE
[Atrial Fibrillation] : atrial fibrillation [Pacemaker] : pacemaker [[employer]] : [employer] [DDI] : DDI [Voltage: ___ volts] : Voltage was [unfilled] volts [Impedance: ___ Ohms] : current cell impedance is [unfilled] Ohms [Threshold Testing Performed] : Threshold testing was performed [___V @] : [unfilled] V [___ ms] : [unfilled] ms [None] : none [Lead Imp:  ___ohms] : lead impedance was [unfilled] ohms [Sensing Amplitude ___mv] : sensing amplitude was [unfilled] mv [de-identified] : complete heart block with ventricular escape ~ 35 bpm [de-identified] : KOSTA [de-identified] : Assurity MRI [de-identified] : [id number] [de-identified] : 5/16/2018 [de-identified] : 61 [de-identified] : 8.9 - 10.3 years longevity  [de-identified] : >99% AT/AF\par  100%\par Mode is DDIR\par

## 2021-01-14 ENCOUNTER — APPOINTMENT (OUTPATIENT)
Dept: HEART AND VASCULAR | Facility: CLINIC | Age: 86
End: 2021-01-14
Payer: MEDICARE

## 2021-01-14 PROCEDURE — 93923 UPR/LXTR ART STDY 3+ LVLS: CPT

## 2021-01-14 PROCEDURE — 36415 COLL VENOUS BLD VENIPUNCTURE: CPT

## 2021-01-15 LAB
INR PPP: 1.56 RATIO
PT BLD: 18 SEC

## 2021-01-22 ENCOUNTER — APPOINTMENT (OUTPATIENT)
Dept: HEART AND VASCULAR | Facility: CLINIC | Age: 86
End: 2021-01-22
Payer: MEDICARE

## 2021-01-22 PROCEDURE — 36415 COLL VENOUS BLD VENIPUNCTURE: CPT

## 2021-01-23 ENCOUNTER — NON-APPOINTMENT (OUTPATIENT)
Age: 86
End: 2021-01-23

## 2021-01-26 LAB
INR PPP: 3.37 RATIO
PT BLD: 37.5 SEC

## 2021-02-04 ENCOUNTER — APPOINTMENT (OUTPATIENT)
Dept: HEART AND VASCULAR | Facility: CLINIC | Age: 86
End: 2021-02-04
Payer: MEDICARE

## 2021-02-04 PROCEDURE — 36415 COLL VENOUS BLD VENIPUNCTURE: CPT

## 2021-02-05 LAB
INR PPP: 1.47 RATIO
PT BLD: 17 SEC

## 2021-03-23 ENCOUNTER — NON-APPOINTMENT (OUTPATIENT)
Age: 86
End: 2021-03-23

## 2021-03-23 ENCOUNTER — APPOINTMENT (OUTPATIENT)
Dept: HEART AND VASCULAR | Facility: CLINIC | Age: 86
End: 2021-03-23
Payer: MEDICARE

## 2021-03-23 VITALS
DIASTOLIC BLOOD PRESSURE: 72 MMHG | HEIGHT: 73 IN | OXYGEN SATURATION: 99 % | HEART RATE: 60 BPM | BODY MASS INDEX: 23.59 KG/M2 | WEIGHT: 178 LBS | SYSTOLIC BLOOD PRESSURE: 140 MMHG

## 2021-03-23 VITALS — TEMPERATURE: 97.3 F

## 2021-03-23 DIAGNOSIS — I87.2 VENOUS INSUFFICIENCY (CHRONIC) (PERIPHERAL): ICD-10-CM

## 2021-03-23 PROCEDURE — 93000 ELECTROCARDIOGRAM COMPLETE: CPT

## 2021-03-23 PROCEDURE — 99213 OFFICE O/P EST LOW 20 MIN: CPT | Mod: 25

## 2021-03-23 NOTE — DISCUSSION/SUMMARY
[FreeTextEntry1] : The patient is fairly active but has some dyspnea on exertion.  His EKG shows atrial fibrillation and a paced rhythm.  His edema is mild.  He is responding well to Lasix.  His blood pressure is slightly elevated.  He will attempt to improve his diet.  He will continue his current medication.

## 2021-03-23 NOTE — HISTORY OF PRESENT ILLNESS
[FreeTextEntry1] : 2 nights ago the patient had a headache for 12 hours.  It has not recurred.  He walks 1 mile twice a week.  He has dyspnea walking uphill with the mask on and stops more than once.  He is having a good response to furosemide.  He was told his pacemaker is functioning well.

## 2021-03-23 NOTE — REVIEW OF SYSTEMS
[Dyspnea on exertion] : dyspnea during exertion [Lower Ext Edema] : lower extremity edema [see HPI] : see HPI [Negative] : Heme/Lymph

## 2021-03-30 ENCOUNTER — RX RENEWAL (OUTPATIENT)
Age: 86
End: 2021-03-30

## 2021-05-11 ENCOUNTER — APPOINTMENT (OUTPATIENT)
Dept: HEART AND VASCULAR | Facility: CLINIC | Age: 86
End: 2021-05-11
Payer: MEDICARE

## 2021-05-11 VITALS
TEMPERATURE: 94.8 F | HEART RATE: 68 BPM | SYSTOLIC BLOOD PRESSURE: 113 MMHG | HEIGHT: 73 IN | WEIGHT: 178 LBS | DIASTOLIC BLOOD PRESSURE: 67 MMHG | BODY MASS INDEX: 23.59 KG/M2

## 2021-05-11 PROCEDURE — 99213 OFFICE O/P EST LOW 20 MIN: CPT | Mod: 25

## 2021-05-11 PROCEDURE — 93280 PM DEVICE PROGR EVAL DUAL: CPT

## 2021-05-11 NOTE — HISTORY OF PRESENT ILLNESS
[None] : The patient complains of no symptoms [Palpitations] : no palpitations [SOB] : no dyspnea [Syncope] : no syncope [Dizziness] : no dizziness [Chest Pain] : no chest pain or discomfort [Shoulder Pain] : no shoulder pain [Pain at Site] : no pain at device site [Erythema at Site] : no erythema at device site [Swelling at Site] : no swelling at device site [de-identified] : 87 yo man with permanent Afib (on Coumadin), HTN, CKD and AV block S/P PPM (original implant 2000, generator change 2/9/09), presents for device follow-up after his second generator change on 5/16/18. He denies device related complaints. He has been pacer dependent in that he paces 100% of the time but there is a slow ventricular escape in the 40's with inhibition. EF has been preserved.  He does note feeling SOB with stair climbing at times - this is a little better with programming to DDIR.  No bleeding issues or other complaints.  Transitioned from Coumadin to Eliquis and denies any bleeding issues.\par \par ECHO 11/2019 EF 70%, mild to mod MR, severely dilated LA, mild to mod conc LVH

## 2021-05-11 NOTE — PROCEDURE
[Atrial Fibrillation] : atrial fibrillation [Pacemaker] : pacemaker [[employer]] : [employer] [DDI] : DDI [Voltage: ___ volts] : Voltage was [unfilled] volts [Impedance: ___ Ohms] : current cell impedance is [unfilled] Ohms [Threshold Testing Performed] : Threshold testing was performed [Lead Imp:  ___ohms] : lead impedance was [unfilled] ohms [Sensing Amplitude ___mv] : sensing amplitude was [unfilled] mv [___V @] : [unfilled] V [___ ms] : [unfilled] ms [None] : none [de-identified] : complete heart block with ventricular escape ~ 35 bpm [de-identified] : KOSTA [de-identified] : Assurity MRI [de-identified] : [id number] [de-identified] : 5/16/2018 [de-identified] : 61 [de-identified] : 8.9 - 10.3 years longevity  [de-identified] : >99% AT/AF\par  100%\par Mode is DDIR\par

## 2021-06-23 ENCOUNTER — APPOINTMENT (OUTPATIENT)
Dept: HEART AND VASCULAR | Facility: CLINIC | Age: 86
End: 2021-06-23

## 2021-09-26 ENCOUNTER — RX RENEWAL (OUTPATIENT)
Age: 86
End: 2021-09-26

## 2021-09-27 ENCOUNTER — RX RENEWAL (OUTPATIENT)
Age: 86
End: 2021-09-27

## 2021-10-07 ENCOUNTER — APPOINTMENT (OUTPATIENT)
Dept: HEART AND VASCULAR | Facility: CLINIC | Age: 86
End: 2021-10-07
Payer: MEDICARE

## 2021-10-07 ENCOUNTER — NON-APPOINTMENT (OUTPATIENT)
Age: 86
End: 2021-10-07

## 2021-10-07 VITALS
BODY MASS INDEX: 23.33 KG/M2 | HEIGHT: 73 IN | WEIGHT: 176 LBS | HEART RATE: 60 BPM | SYSTOLIC BLOOD PRESSURE: 118 MMHG | DIASTOLIC BLOOD PRESSURE: 70 MMHG

## 2021-10-07 VITALS — TEMPERATURE: 97 F

## 2021-10-07 PROCEDURE — 93880 EXTRACRANIAL BILAT STUDY: CPT

## 2021-10-07 PROCEDURE — 93000 ELECTROCARDIOGRAM COMPLETE: CPT

## 2021-10-07 PROCEDURE — 36415 COLL VENOUS BLD VENIPUNCTURE: CPT

## 2021-10-07 PROCEDURE — 99214 OFFICE O/P EST MOD 30 MIN: CPT | Mod: 25

## 2021-10-07 NOTE — REASON FOR VISIT
[Carotid, Aortic and Peripheral Vascular Disease] : carotid, aortic and peripheral vascular disease [Arrhythmia/ECG Abnorrmalities] : arrhythmia/ECG abnormalities

## 2021-10-08 LAB
ANION GAP SERPL CALC-SCNC: 13 MMOL/L
BUN SERPL-MCNC: 18 MG/DL
CALCIUM SERPL-MCNC: 9.4 MG/DL
CHLORIDE SERPL-SCNC: 107 MMOL/L
CHOLEST SERPL-MCNC: 138 MG/DL
CO2 SERPL-SCNC: 23 MMOL/L
CREAT SERPL-MCNC: 1.14 MG/DL
ESTIMATED AVERAGE GLUCOSE: 126 MG/DL
GLUCOSE SERPL-MCNC: 94 MG/DL
HBA1C MFR BLD HPLC: 6 %
HCT VFR BLD CALC: 38.9 %
HDLC SERPL-MCNC: 68 MG/DL
HGB BLD-MCNC: 11.9 G/DL
LDLC SERPL CALC-MCNC: 54 MG/DL
NONHDLC SERPL-MCNC: 70 MG/DL
POTASSIUM SERPL-SCNC: 4.1 MMOL/L
SODIUM SERPL-SCNC: 143 MMOL/L
TRIGL SERPL-MCNC: 83 MG/DL

## 2021-10-08 NOTE — HISTORY OF PRESENT ILLNESS
[FreeTextEntry1] : 86 year old male with PMHX of AFIB, PPM, AS, Thoracic Aortic Aneurysm, PVD ( Iliac Aneurysm), HTN and CKD here for follow up. \par \par Since last visit, patient has inadvertently stopped taking his Irbesartan for the past few months. Blood pressure noted  118/70 mmHg today. He overall is feeling well. He continues to have some dyspnea walking up an incline but feels this is stable and with no change. He walks 2 miles often which include a hill. He stops half way because of shortness of breath and resumes shortly after. He denies any palpitations, dizziness, syncope or chest discomfort. \par \par He has mild swelling of left leg but denies any discomfort / pain, PND or orthopnea. Last PPM checked 05/2021 with next scheduled due 11/2021. He is non fasting today.

## 2021-10-08 NOTE — PHYSICAL EXAM
[Well Developed] : well developed [Well Nourished] : well nourished [No Acute Distress] : no acute distress [Normal Conjunctiva] : normal conjunctiva [Normal S1, S2] : normal S1, S2 [Clear Lung Fields] : clear lung fields [Good Air Entry] : good air entry [No Respiratory Distress] : no respiratory distress  [Normal Radial B/L] : normal radial B/L [Normal PT B/L] : normal PT B/L [Normal DP B/L] : normal DP B/L [Moves all extremities] : moves all extremities [No Focal Deficits] : no focal deficits [Normal Speech] : normal speech [Alert and Oriented] : alert and oriented [Normal memory] : normal memory [Venous varicosities] : venous varicosities [de-identified] : Bilateral carotid bruit vs radiation of cardiac murmur  [de-identified] : Grade 2-3/6 systolic murmur heard throughout  [de-identified] : 1+ edema LLE

## 2021-10-08 NOTE — DISCUSSION/SUMMARY
[FreeTextEntry1] : 86 year old male with PMHX of AFIB, PPM, AS, Thoracic Aortic Aneurysm, PVD ( Iliac Aneurysm), HTN and CKD here for follow up. \par \par AFIB: Asymptomatic. EKG V Paced at 60 bpm. Continue with Metoprolol 50mg QD and Eliquis 5mg BID\par AS: Has baseline AVILA, no change. Mild edema noted of LLE. Continue with Furosemide 20mg every other day. Get venous ultrasound on follow up. \par Aortic Aneurysm: AO at 4.6 cm on echo. Will get AAA screening at next visit and focus on Iliacs. Continue with BP control Irbesartan and BBlocker\par HTN: Controlled but given hx of aneurysm, will restart Irbesartan but at 75mg. \par HLD: Non fasting labs sent today. Continue with Atorvastatin 40mg for now\par \par Follow up in 2 months with Echocardiogram and AAA screening. \par  I have discussed the case with YING Marvin. I have personally performed a history, physical exam, and my own medical decision making. I have reviewed the note and agree with the findings and plan.  Patient has had dizziness.  Carotid Doppler is normal.  Plan as above

## 2021-11-09 ENCOUNTER — APPOINTMENT (OUTPATIENT)
Dept: HEART AND VASCULAR | Facility: CLINIC | Age: 86
End: 2021-11-09
Payer: MEDICARE

## 2021-11-09 VITALS
BODY MASS INDEX: 23.33 KG/M2 | HEART RATE: 74 BPM | WEIGHT: 176 LBS | SYSTOLIC BLOOD PRESSURE: 132 MMHG | DIASTOLIC BLOOD PRESSURE: 73 MMHG | HEIGHT: 73 IN

## 2021-11-09 PROCEDURE — 93280 PM DEVICE PROGR EVAL DUAL: CPT

## 2021-11-09 PROCEDURE — 99213 OFFICE O/P EST LOW 20 MIN: CPT | Mod: 25

## 2021-11-10 NOTE — PHYSICAL EXAM
[General Appearance - Well Developed] : well developed [Normal Appearance] : normal appearance [Well Groomed] : well groomed [General Appearance - Well Nourished] : well nourished [No Deformities] : no deformities [General Appearance - In No Acute Distress] : no acute distress [Heart Rate And Rhythm] : heart rate and rhythm were normal [Heart Sounds] : normal S1 and S2 [Murmurs] : no murmurs present [Respiration, Rhythm And Depth] : normal respiratory rhythm and effort [Exaggerated Use Of Accessory Muscles For Inspiration] : no accessory muscle use [Left Infraclavicular] : left infraclavicular area [Auscultation Breath Sounds / Voice Sounds] : lungs were clear to auscultation bilaterally [Clean] : clean [Dry] : dry [Well-Healed] : well-healed [Palpable Crepitus] : no palpable crepitus [Bleeding] : no active bleeding [Foul Odor] : no foul smell [Purulent Drainage] : no purulent drainage [Serosanguineous Drainage] : no serosanquineous drainage [Serous Drainage] : no serous drainage [Erythema] : not erythematous [Warm] : not warm [Tender] : not tender [Indurated] : not indurated [Fluctuant] : not fluctuant [Abdomen Soft] : soft [Abdomen Tenderness] : non-tender [Abdomen Mass (___ Cm)] : no abdominal mass palpated [Nail Clubbing] : no clubbing of the fingernails [Cyanosis, Localized] : no localized cyanosis [Petechial Hemorrhages (___cm)] : no petechial hemorrhages [] : no ischemic changes

## 2021-11-10 NOTE — PROCEDURE
[Atrial Fibrillation] : atrial fibrillation [Pacemaker] : pacemaker [[employer]] : [employer] [DDI] : DDI [Voltage: ___ volts] : Voltage was [unfilled] volts [Impedance: ___ Ohms] : current cell impedance is [unfilled] Ohms [Threshold Testing Performed] : Threshold testing was performed [Lead Imp:  ___ohms] : lead impedance was [unfilled] ohms [Sensing Amplitude ___mv] : sensing amplitude was [unfilled] mv [___V @] : [unfilled] V [___ ms] : [unfilled] ms [None] : none [de-identified] : complete heart block with ventricular escape ~ 35 bpm [de-identified] : KOSTA [de-identified] : Assurity MRI [de-identified] : [id number] [de-identified] : 5/16/2018 [de-identified] : 61 [de-identified] : 8.7 - 10.2years longevity  [de-identified] : >99% AT/AF\par  100%\par Mode is DDIR\par

## 2021-11-10 NOTE — HISTORY OF PRESENT ILLNESS
[None] : The patient complains of no symptoms [Palpitations] : no palpitations [SOB] : no dyspnea [Syncope] : no syncope [Dizziness] : no dizziness [Chest Pain] : no chest pain or discomfort [Shoulder Pain] : no shoulder pain [Pain at Site] : no pain at device site [Erythema at Site] : no erythema at device site [Swelling at Site] : no swelling at device site [de-identified] : 85 yo man with permanent Afib (on Coumadin), HTN, CKD and AV block S/P PPM (original implant 2000, generator change 2/9/09), presents for device follow-up after his second generator change on 5/16/18. He denies device related complaints. He has been pacer dependent in that he paces 100% of the time but there is a slow ventricular escape in the 40's with inhibition. EF has been preserved.  He does note feeling SOB with stair climbing at times - this is a little better with programming to DDIR.  No bleeding issues or other complaints.  Transitioned from Coumadin to Eliquis and denies any bleeding issues.\par \par ECHO 11/2019 EF 70%, mild to mod MR, severely dilated LA, mild to mod conc LVH

## 2021-12-25 ENCOUNTER — RX RENEWAL (OUTPATIENT)
Age: 86
End: 2021-12-25

## 2022-01-18 ENCOUNTER — APPOINTMENT (OUTPATIENT)
Dept: HEART AND VASCULAR | Facility: CLINIC | Age: 87
End: 2022-01-18

## 2022-02-03 ENCOUNTER — RESULT REVIEW (OUTPATIENT)
Age: 87
End: 2022-02-03

## 2022-02-03 ENCOUNTER — OUTPATIENT (OUTPATIENT)
Dept: OUTPATIENT SERVICES | Facility: HOSPITAL | Age: 87
LOS: 1 days | End: 2022-02-03
Payer: MEDICARE

## 2022-02-03 ENCOUNTER — APPOINTMENT (OUTPATIENT)
Dept: ORTHOPEDIC SURGERY | Facility: CLINIC | Age: 87
End: 2022-02-03
Payer: MEDICARE

## 2022-02-03 VITALS — SYSTOLIC BLOOD PRESSURE: 135 MMHG | DIASTOLIC BLOOD PRESSURE: 75 MMHG

## 2022-02-03 PROCEDURE — 73130 X-RAY EXAM OF HAND: CPT

## 2022-02-03 PROCEDURE — 73130 X-RAY EXAM OF HAND: CPT | Mod: 26,LT

## 2022-02-03 PROCEDURE — 99214 OFFICE O/P EST MOD 30 MIN: CPT

## 2022-02-14 ENCOUNTER — APPOINTMENT (OUTPATIENT)
Dept: HEART AND VASCULAR | Facility: CLINIC | Age: 87
End: 2022-02-14
Payer: MEDICARE

## 2022-02-14 VITALS
WEIGHT: 175 LBS | TEMPERATURE: 97 F | SYSTOLIC BLOOD PRESSURE: 127 MMHG | BODY MASS INDEX: 23.19 KG/M2 | DIASTOLIC BLOOD PRESSURE: 82 MMHG | HEIGHT: 73 IN | HEART RATE: 60 BPM

## 2022-02-14 DIAGNOSIS — R00.2 PALPITATIONS: ICD-10-CM

## 2022-02-14 PROCEDURE — ZZZZZ: CPT

## 2022-02-14 PROCEDURE — 36415 COLL VENOUS BLD VENIPUNCTURE: CPT

## 2022-02-14 PROCEDURE — 93306 TTE W/DOPPLER COMPLETE: CPT

## 2022-02-14 PROCEDURE — 93000 ELECTROCARDIOGRAM COMPLETE: CPT

## 2022-02-14 RX ORDER — ALLOPURINOL 300 MG/1
300 TABLET ORAL
Refills: 0 | Status: COMPLETED | COMMUNITY
End: 2022-02-14

## 2022-02-14 NOTE — DISCUSSION/SUMMARY
[de-identified] : 86M with L hand pain after slip and fall in ice. No acute fracture, persistent swelling likely due to Eliquis.\par -ACE wrap applied for compression\par -Removal wrist splint given\par -No need for further follow up.

## 2022-02-14 NOTE — PHYSICAL EXAM
[de-identified] : LUE:\par Area of ecchymosis over palm\par General tenderness over palmar region\par Firing all hand intrinsics, difficulty making a fist due to swelling\par SILT in all nerve distributions\par Radial pulse palpable [de-identified] : Xray L hand - No acute fracture or bony abnormality.

## 2022-02-14 NOTE — PHYSICAL EXAM
[de-identified] : LUE:\par Area of ecchymosis over palm\par General tenderness over palmar region\par Firing all hand intrinsics, difficulty making a fist due to swelling\par SILT in all nerve distributions\par Radial pulse palpable [de-identified] : Xray L hand - No acute fracture or bony abnormality

## 2022-02-14 NOTE — HISTORY OF PRESENT ILLNESS
[de-identified] : 86M [facility] pacemaker on eliquis who slip and fell in ice two days ago injuring his L hand. Hand has been swollen since then despite ice and cold compresses. Has been taking Extra Strength Tylenol for pain.\par

## 2022-02-14 NOTE — PHYSICAL EXAM
[de-identified] : LUE:\par Area of ecchymosis over palm\par General tenderness over palmar region\par Firing all hand intrinsics, difficulty making a fist due to swelling\par SILT in all nerve distributions\par Radial pulse palpable [de-identified] : Xray L hand - No acute fracture or bony abnormality

## 2022-02-14 NOTE — HISTORY OF PRESENT ILLNESS
[de-identified] : 86M [facility] pacemaker on eliquis who slip and fell in ice two days ago injuring his L hand. Hand has been swollen since then despite ice and cold compresses. Has been taking Extra Strength Tylenol for pain.\par

## 2022-02-14 NOTE — PHYSICAL EXAM
[de-identified] : LUE:\par Area of ecchymosis over palm\par General tenderness over palmar region\par Firing all hand intrinsics, difficulty making a fist due to swelling\par SILT in all nerve distributions\par Radial pulse palpable [de-identified] : Xray L hand - No acute fracture or bony abnormality

## 2022-02-14 NOTE — HISTORY OF PRESENT ILLNESS
[de-identified] : 86M PMH pacemaker on eliquis who slip and fell in ice two days ago injuring his L hand. Hand has been swollen since then despite ice and cold compresses. Has been taking Extra [id number] PMH pacemaker on eliquis who slip and fell in ice two days ago injuring his L hand. Hand has been swollen since then despite ice and cold compresses. Has been taking Extra Strength Tylenol for pain.ngth Tylenol for pain.

## 2022-02-14 NOTE — HISTORY OF PRESENT ILLNESS
[de-identified] : 86M [facility] pacemaker on eliquis who slip and fell in ice two days ago injuring his L hand. Hand has been swollen since then despite ice and cold compresses. Has been taking Extra Strength Tylenol for pain.\par

## 2022-02-14 NOTE — PHYSICAL EXAM
[de-identified] : LUE:\par Area of ecchymosis over palm\par General tenderness over palmar region\par Firing all hand intrinsics, difficulty making a fist due to swelling\par SILT in all nerve distributions\par Radial pulse palpable [de-identified] : Xray L hand - No acute fracture or bony abnormality

## 2022-02-14 NOTE — DISCUSSION/SUMMARY
[de-identified] : 86M with L hand pain after slip and fall in ice. No acute fracture, persistent swelling likely due to Eliquis.\par -ACE wrap applied for compression\par -Removal wrist splint given\par -No need for further follow up\par \par All medical record entries made by "residents name" acting as a scribe for this encounter under the direction of "attending physician." I have reviewed the chart and agree that the record accurately reflects my personal performance of the history, physical exam, assessment and plan. I have also personally directed, reviewed and agreed with the chart.

## 2022-02-14 NOTE — DISCUSSION/SUMMARY
[de-identified] : 86M with L hand pain after slip and fall in ice. No acute fracture, persistent swelling likely due to Eliquis.\par -ACE wrap applied for compression\par -Removal wrist splint given\par -No need for further follow up\par \par All medical record entries made by "residents name" acting as a scribe for this encounter under the direction of "attending physician." I have reviewed the chart and agree that the record accurately reflects my personal performance of the history, physical exam, assessment and plan. I have also personally directed, reviewed and agreed with the chart.

## 2022-02-14 NOTE — DISCUSSION/SUMMARY
[de-identified] : 86M with L hand pain after slip and fall in ice. No acute fracture, persistent swelling likely due to Eliquis.\par -ACE wrap applied for compression\par -Removal wrist splint given\par -No need for further follow up\par \par All medical record entries made by "residents name" acting as a scribe for this encounter under the direction of "attending physician." I have reviewed the chart and agree that the record accurately reflects my personal performance of the history, physical exam, assessment and plan. I have also personally directed, reviewed and agreed with the chart.

## 2022-02-14 NOTE — REASON FOR VISIT
[Arrhythmia/ECG Abnorrmalities] : arrhythmia/ECG abnormalities [Carotid, Aortic and Peripheral Vascular Disease] : carotid, aortic and peripheral vascular disease

## 2022-02-14 NOTE — HISTORY OF PRESENT ILLNESS
[de-identified] : 86M [facility] pacemaker on eliquis who slip and fell in ice two days ago injuring his L hand. Hand has been swollen since then despite ice and cold compresses. Has been taking Extra Strength Tylenol for pain.\par

## 2022-02-14 NOTE — DISCUSSION/SUMMARY
[de-identified] : 86M with L hand pain after slip and fall in ice. No acute fracture, persistent swelling likely due to Eliquis.\par -ACE wrap applied for compression\par -Removal wrist splint given\par -No need for further follow up\par \par All medical record entries made by "residents name" acting as a scribe for this encounter under the direction of "attending physician." I have reviewed the chart and agree that the record accurately reflects my personal performance of the history, physical exam, assessment and plan. I have also personally directed, reviewed and agreed with the chart.

## 2022-02-15 LAB
ANION GAP SERPL CALC-SCNC: 12 MMOL/L
BUN SERPL-MCNC: 23 MG/DL
CALCIUM SERPL-MCNC: 9.6 MG/DL
CHLORIDE SERPL-SCNC: 105 MMOL/L
CHOLEST SERPL-MCNC: 134 MG/DL
CO2 SERPL-SCNC: 24 MMOL/L
CREAT SERPL-MCNC: 1.18 MG/DL
ESTIMATED AVERAGE GLUCOSE: 126 MG/DL
GLUCOSE SERPL-MCNC: 93 MG/DL
HBA1C MFR BLD HPLC: 6 %
HCT VFR BLD CALC: 39.2 %
HDLC SERPL-MCNC: 64 MG/DL
HGB BLD-MCNC: 12.3 G/DL
LDLC SERPL CALC-MCNC: 56 MG/DL
NONHDLC SERPL-MCNC: 70 MG/DL
POTASSIUM SERPL-SCNC: 4.4 MMOL/L
SODIUM SERPL-SCNC: 140 MMOL/L
TRIGL SERPL-MCNC: 72 MG/DL

## 2022-02-15 NOTE — HISTORY OF PRESENT ILLNESS
[FreeTextEntry1] : 86 year old male with PMHX of AFIB, PPM, AS ( mod on echo 2020, peak gradient at 36 mmHg and valve at 1.1 sqm) Thoracic Aortic Aneurysm, PVD ( Iliac Aneurysm), HTN and CKD here for follow up and echocardiogram. \par \par Since last visit, he is overall doing well. He continues to walk at 1- 2 miles every other day. During his walks there is an incline. He still notices some dyspnea walking up the incline and occasionally has to stop and take a breath. He reports no associated chest pains. He feels this attributed to his mask wearing and adds there is  no change from his history.  He denies any pedal edema, PND or orthopnea. \par \par He occasionally feels some palpitation. He reports feeling it lasting for a few seconds. Last episode a few weeks ago. He denies any associated shortness of breath, dizziness, syncope or neuro focal deficits. This has been going on for many years, no change\par \par  Last PPM checked 11/2021. He is non fasting today.

## 2022-02-15 NOTE — PHYSICAL EXAM
[Well Developed] : well developed [Well Nourished] : well nourished [No Acute Distress] : no acute distress [Normal Conjunctiva] : normal conjunctiva [Normal S1, S2] : normal S1, S2 [Clear Lung Fields] : clear lung fields [Good Air Entry] : good air entry [No Respiratory Distress] : no respiratory distress  [Normal Gait] : normal gait [Normal Radial B/L] : normal radial B/L [Normal PT B/L] : normal PT B/L [Moves all extremities] : moves all extremities [No Focal Deficits] : no focal deficits [Normal Speech] : normal speech [Normal memory] : normal memory [Alert and Oriented] : alert and oriented [de-identified] : Bilateral carotid bruit [de-identified] : 2/6 systolic murmur heard throughout  [de-identified] : LLE 1+ edema

## 2022-02-15 NOTE — DISCUSSION/SUMMARY
[FreeTextEntry1] : 86 year old male with PMHX of AFIB, PPM, AS ( mod on echo 2020, peak gradient at 36 mmHg and valve at 1.1 sqm) Thoracic Aortic Aneurysm, PVD ( Iliac Aneurysm), HTN and CKD here for follow up and echocardiogram. \par \par \par AFIB: Occasionally feeling palpitations. EKG AFIB, V Paced at 60 bpm, no change. Continue with Metoprolol 50mg QD and Eliquis 5mg BID\par AS: Stable at moderate on today's echocardiogram. Has baseline AVILA, no change. Mild edema noted of LLE. Continue with Furosemide 20mg every other day. \par Aortic Aneurysm: AO at 4.4 cm on echo. Will get AAA screening at next visit and focus on Iliacs. Continue with BP control Irbesartan and BBlocker\par HTN: Controlled. Continue with Irbesartan but at 75mg and Metoprolol 50mg QD\par HLD: Controlled. LDL 54 (11/2021). Continue with Atorvastatin 40mg\par \par Follow up in 4 months with AAA screening. \par I, Dr. Tracy Minaya personally performed the evaluation and management services for this patient who presents today with a changed problem.  The evaluation and management includes conducting the examination assessing all conditions and establishing a new plan of care.  Today my ACP Jaz Marvin was here and recorded the evaluation and management services.  The patient had recurrent palpitations and dizziness.  These were brief.  There has been no significant arrhythmia on the pacemaker monitoring.  Plan as above.

## 2022-02-15 NOTE — REVIEW OF SYSTEMS
[Dyspnea on exertion] : dyspnea during exertion [Palpitations] : palpitations [Fever] : no fever [Chills] : no chills [SOB] : no shortness of breath [Chest Discomfort] : no chest discomfort [Lower Ext Edema] : no extremity edema [Leg Claudication] : no intermittent leg claudication [Orthopnea] : no orthopnea [PND] : no PND [Syncope] : no syncope [Cough] : no cough [Nausea] : no nausea [Vomiting] : no vomiting [Heartburn] : no heartburn [Diarrhea] : diarrhea [Dizziness] : no dizziness [Numbness (Hypoesthesia)] : no numbness [Weakness] : no weakness [Speech Disturbance] : no speech disturbance [Easy Bleeding] : no tendency for easy bleeding

## 2022-03-17 ENCOUNTER — RESULT REVIEW (OUTPATIENT)
Age: 87
End: 2022-03-17

## 2022-03-17 ENCOUNTER — OUTPATIENT (OUTPATIENT)
Dept: OUTPATIENT SERVICES | Facility: HOSPITAL | Age: 87
LOS: 1 days | End: 2022-03-17
Payer: MEDICARE

## 2022-03-17 ENCOUNTER — APPOINTMENT (OUTPATIENT)
Dept: ORTHOPEDIC SURGERY | Facility: CLINIC | Age: 87
End: 2022-03-17
Payer: MEDICARE

## 2022-03-17 DIAGNOSIS — S69.90XA UNSPECIFIED INJURY OF UNSPECIFIED WRIST, HAND AND FINGER(S), INITIAL ENCOUNTER: ICD-10-CM

## 2022-03-17 PROCEDURE — 73130 X-RAY EXAM OF HAND: CPT | Mod: 26,LT

## 2022-03-17 PROCEDURE — 99213 OFFICE O/P EST LOW 20 MIN: CPT

## 2022-03-17 PROCEDURE — 73130 X-RAY EXAM OF HAND: CPT

## 2022-03-18 PROBLEM — S69.90XA HAND INJURY: Status: ACTIVE | Noted: 2022-02-04

## 2022-03-18 NOTE — HISTORY OF PRESENT ILLNESS
[de-identified] : 86M [facility] pacemaker on eliquis who slip and fell in ice 2.5 weeks ago and hurt L hand. Has been managing conservatively however is still swollen and painful along MCP joints of L hand. Swelling has improved slightly but pain persists.

## 2022-03-18 NOTE — DISCUSSION/SUMMARY
[de-identified] : 86M with L hand pain after slip and fall in ice w MCP hyperextension, generalized pain and swelling of MCPs, flexor and extensor tendons with generalized soreness \par -likely due to flexor tendon strains due to fall \par -prescribed hand therapy to improve ROM\par -NSAIDs for inflammation \par -return in 3 weeks \par \par All medical record entries made by "emerson" acting as a scribe for this encounter under the direction of "stanley." I have reviewed the chart and agree that the record accurately reflects my personal performance of the history, physical exam, assessment and plan. I have also personally directed, reviewed and agreed with the chart.\par

## 2022-03-18 NOTE — PHYSICAL EXAM
[de-identified] : GEN: NAD\par L hand: moderate swelling along 2-5 MCP joints. Flexor and extensor tendons sore with passive flexion and extension of MCP joints.

## 2022-05-10 ENCOUNTER — APPOINTMENT (OUTPATIENT)
Dept: HEART AND VASCULAR | Facility: CLINIC | Age: 87
End: 2022-05-10
Payer: MEDICARE

## 2022-05-10 VITALS — DIASTOLIC BLOOD PRESSURE: 69 MMHG | SYSTOLIC BLOOD PRESSURE: 120 MMHG

## 2022-05-10 PROCEDURE — 99213 OFFICE O/P EST LOW 20 MIN: CPT

## 2022-05-10 PROCEDURE — 93000 ELECTROCARDIOGRAM COMPLETE: CPT

## 2022-05-10 NOTE — PROCEDURE
[Atrial Fibrillation] : atrial fibrillation [Pacemaker] : pacemaker [[employer]] : [employer] [DDI] : DDI [Voltage: ___ volts] : Voltage was [unfilled] volts [Impedance: ___ Ohms] : current cell impedance is [unfilled] Ohms [Threshold Testing Performed] : Threshold testing was performed [Lead Imp:  ___ohms] : lead impedance was [unfilled] ohms [Sensing Amplitude ___mv] : sensing amplitude was [unfilled] mv [___V @] : [unfilled] V [___ ms] : [unfilled] ms [None] : none [de-identified] : complete heart block with NO ESCAPE > 30 BPM [de-identified] : KOSTA [de-identified] : Assurity MRI [de-identified] : [id number] [de-identified] : 5/16/2018 [de-identified] : 61 [de-identified] : 9-10 years longevity  [de-identified] : >99% AT/AF\par  100%\par Mode is DDIR\par

## 2022-05-10 NOTE — HISTORY OF PRESENT ILLNESS
[None] : The patient complains of no symptoms [Palpitations] : no palpitations [SOB] : no dyspnea [Syncope] : no syncope [Dizziness] : no dizziness [Chest Pain] : no chest pain or discomfort [Shoulder Pain] : no shoulder pain [Pain at Site] : no pain at device site [Erythema at Site] : no erythema at device site [Swelling at Site] : no swelling at device site [de-identified] : 85 yo man with permanent Afib (on Coumadin), HTN, CKD and AV block S/P PPM (original implant 2000, generator change 2/9/09), presents for device follow-up after his second generator change on 5/16/18. He denies device related complaints. He has been pacer dependent in that he paces 100% of the time but there is a slow ventricular escape in the 40's with inhibition. EF has been preserved.  He does note feeling SOB with stair climbing at times - this is a little better with programming to DDIR.  Tolerating Eliquis without bleeding issues.\par \par ECHO 11/2019 EF 70%, mild to mod MR, severely dilated LA, mild to mod conc LVH

## 2022-06-14 ENCOUNTER — APPOINTMENT (OUTPATIENT)
Dept: HEART AND VASCULAR | Facility: CLINIC | Age: 87
End: 2022-06-14

## 2022-10-06 ENCOUNTER — RX RENEWAL (OUTPATIENT)
Age: 87
End: 2022-10-06

## 2022-10-07 ENCOUNTER — RX RENEWAL (OUTPATIENT)
Age: 87
End: 2022-10-07

## 2022-11-15 ENCOUNTER — APPOINTMENT (OUTPATIENT)
Dept: HEART AND VASCULAR | Facility: CLINIC | Age: 87
End: 2022-11-15

## 2022-11-15 VITALS
HEART RATE: 68 BPM | DIASTOLIC BLOOD PRESSURE: 76 MMHG | WEIGHT: 175 LBS | SYSTOLIC BLOOD PRESSURE: 162 MMHG | HEIGHT: 73 IN | BODY MASS INDEX: 23.19 KG/M2

## 2022-11-15 PROCEDURE — 93280 PM DEVICE PROGR EVAL DUAL: CPT

## 2022-11-15 NOTE — HISTORY OF PRESENT ILLNESS
[None] : The patient complains of no symptoms [Palpitations] : no palpitations [SOB] : no dyspnea [Syncope] : no syncope [Dizziness] : no dizziness [Chest Pain] : no chest pain or discomfort [Shoulder Pain] : no shoulder pain [Pain at Site] : no pain at device site [Erythema at Site] : no erythema at device site [Swelling at Site] : no swelling at device site [de-identified] : 86 yo man with permanent Afib, HTN, CKD and AV block S/P PPM (original implant 2000, generator change 2/9/09), presents for device follow-up after his second generator change on 5/16/18. He denies device related complaints. He has been pacer dependent in that he paces 100% of the time but there is a slow ventricular escape in the 40's with inhibition. EF has been preserved.  He does note feeling SOB with stair climbing at times - this is a little better with programming to DDIR.  Tolerating Eliquis without bleeding issues.\par \par Echo 2/2022 EF 60%\par ECHO 11/2019 EF 70%, mild to mod MR, severely dilated LA, mild to mod conc LVH

## 2022-11-15 NOTE — PROCEDURE
[Atrial Fibrillation] : atrial fibrillation [Pacemaker] : pacemaker [[employer]] : [employer] [DDI] : DDI [Voltage: ___ volts] : Voltage was [unfilled] volts [Impedance: ___ Ohms] : current cell impedance is [unfilled] Ohms [Threshold Testing Performed] : Threshold testing was performed [Lead Imp:  ___ohms] : lead impedance was [unfilled] ohms [Sensing Amplitude ___mv] : sensing amplitude was [unfilled] mv [___V @] : [unfilled] V [___ ms] : [unfilled] ms [None] : none [de-identified] : complete heart block with NO ESCAPE > 30 BPM [de-identified] : KOSTA [de-identified] : Assurity MRI [de-identified] : [id number] [de-identified] : 5/16/2018 [de-identified] : 61 [de-identified] : 4.6-5.6 years longevity  [de-identified] : >99% AT/AF\par  100%\par Mode is DDIR\par

## 2022-11-17 ENCOUNTER — APPOINTMENT (OUTPATIENT)
Dept: HEART AND VASCULAR | Facility: CLINIC | Age: 87
End: 2022-11-17

## 2022-11-17 ENCOUNTER — NON-APPOINTMENT (OUTPATIENT)
Age: 87
End: 2022-11-17

## 2022-11-17 VITALS
TEMPERATURE: 97.4 F | BODY MASS INDEX: 22.13 KG/M2 | DIASTOLIC BLOOD PRESSURE: 84 MMHG | HEIGHT: 73 IN | WEIGHT: 167 LBS | SYSTOLIC BLOOD PRESSURE: 137 MMHG | HEART RATE: 61 BPM

## 2022-11-17 DIAGNOSIS — R42 DIZZINESS AND GIDDINESS: ICD-10-CM

## 2022-11-17 PROCEDURE — 99214 OFFICE O/P EST MOD 30 MIN: CPT

## 2022-11-17 PROCEDURE — 93978 VASCULAR STUDY: CPT

## 2022-11-17 PROCEDURE — ZZZZZ: CPT

## 2022-11-17 PROCEDURE — 93000 ELECTROCARDIOGRAM COMPLETE: CPT

## 2022-11-20 PROBLEM — R42 DIZZINESS: Status: ACTIVE | Noted: 2018-11-18

## 2022-11-20 NOTE — HISTORY OF PRESENT ILLNESS
[FreeTextEntry1] : The patient walks 2 miles every other day.  He has dyspnea on an incline.  He sometimes needs to stop.  He has felt off balance.

## 2022-11-20 NOTE — PHYSICAL EXAM
[Well Developed] : well developed [Well Nourished] : well nourished [Normal Conjunctiva] : normal conjunctiva [No Acute Distress] : no acute distress [Normal S1, S2] : normal S1, S2 [Clear Lung Fields] : clear lung fields [Good Air Entry] : good air entry [No Respiratory Distress] : no respiratory distress  [Normal Radial B/L] : normal radial B/L [Normal PT B/L] : normal PT B/L [Normal DP B/L] : normal DP B/L [Venous varicosities] : venous varicosities [Moves all extremities] : moves all extremities [No Focal Deficits] : no focal deficits [Normal Speech] : normal speech [Alert and Oriented] : alert and oriented [Normal memory] : normal memory [de-identified] : Bilateral carotid bruit vs radiation of cardiac murmur  [de-identified] : 1+ edema LLE  [de-identified] : Grade 2-3/6 systolic murmur heard throughout

## 2023-03-23 ENCOUNTER — APPOINTMENT (OUTPATIENT)
Dept: HEART AND VASCULAR | Facility: CLINIC | Age: 88
End: 2023-03-23
Payer: MEDICARE

## 2023-03-23 ENCOUNTER — NON-APPOINTMENT (OUTPATIENT)
Age: 88
End: 2023-03-23

## 2023-03-23 VITALS — SYSTOLIC BLOOD PRESSURE: 144 MMHG | DIASTOLIC BLOOD PRESSURE: 88 MMHG

## 2023-03-23 VITALS
BODY MASS INDEX: 22.8 KG/M2 | HEIGHT: 73 IN | HEART RATE: 60 BPM | DIASTOLIC BLOOD PRESSURE: 82 MMHG | TEMPERATURE: 97.6 F | OXYGEN SATURATION: 96 % | WEIGHT: 172 LBS | SYSTOLIC BLOOD PRESSURE: 159 MMHG

## 2023-03-23 DIAGNOSIS — I87.8 OTHER SPECIFIED DISORDERS OF VEINS: ICD-10-CM

## 2023-03-23 PROCEDURE — 93000 ELECTROCARDIOGRAM COMPLETE: CPT

## 2023-03-23 PROCEDURE — 99214 OFFICE O/P EST MOD 30 MIN: CPT | Mod: 25

## 2023-03-24 LAB
ANION GAP SERPL CALC-SCNC: 12 MMOL/L
BUN SERPL-MCNC: 19 MG/DL
CALCIUM SERPL-MCNC: 10.1 MG/DL
CHLORIDE SERPL-SCNC: 105 MMOL/L
CHOLEST SERPL-MCNC: 139 MG/DL
CO2 SERPL-SCNC: 24 MMOL/L
CREAT SERPL-MCNC: 1.32 MG/DL
EGFR: 53 ML/MIN/1.73M2
ESTIMATED AVERAGE GLUCOSE: 126 MG/DL
GLUCOSE SERPL-MCNC: 95 MG/DL
HBA1C MFR BLD HPLC: 6 %
HCT VFR BLD CALC: 42.1 %
HDLC SERPL-MCNC: 74 MG/DL
HGB BLD-MCNC: 12.8 G/DL
LDLC SERPL CALC-MCNC: 52 MG/DL
NONHDLC SERPL-MCNC: 66 MG/DL
POTASSIUM SERPL-SCNC: 4.8 MMOL/L
SODIUM SERPL-SCNC: 141 MMOL/L
TRIGL SERPL-MCNC: 70 MG/DL
TSH SERPL-ACNC: 3.06 UIU/ML
URATE SERPL-MCNC: 8.1 MG/DL

## 2023-03-24 NOTE — HISTORY OF PRESENT ILLNESS
[FreeTextEntry1] : The patient has right arm pain at night.  He walks 10–20 blocks which leads to dyspnea and he stops.  This is not changed in the last 6 months.  He occasionally feels off balance.

## 2023-03-24 NOTE — DISCUSSION/SUMMARY
[FreeTextEntry1] : The patient has moderate aortic stenosis.  He has mild dyspnea on exertion which is stable.  Aortic stenosis is not hemodynamically significant.  His EKG shows atrial fibrillation with ventricular pacing.  He is off irbesartan.  His blood pressure has increased and is uncontrolled.  We will prescribe irbesartan.  [EKG obtained to assist in diagnosis and management of assessed problem(s)] : EKG obtained to assist in diagnosis and management of assessed problem(s)

## 2023-03-24 NOTE — PHYSICAL EXAM
[Well Developed] : well developed [Well Nourished] : well nourished [No Acute Distress] : no acute distress [Normal Conjunctiva] : normal conjunctiva [Normal S1, S2] : normal S1, S2 [Clear Lung Fields] : clear lung fields [Good Air Entry] : good air entry [No Respiratory Distress] : no respiratory distress  [Normal PT B/L] : normal PT B/L [Normal Radial B/L] : normal radial B/L [Normal DP B/L] : normal DP B/L [Venous varicosities] : venous varicosities [Moves all extremities] : moves all extremities [No Focal Deficits] : no focal deficits [Normal Speech] : normal speech [Alert and Oriented] : alert and oriented [Normal memory] : normal memory [de-identified] : Bilateral carotid bruit vs radiation of cardiac murmur  [de-identified] : Grade 2-3/6 systolic murmur heard throughout  [de-identified] : 1+ edema LLE

## 2023-03-28 ENCOUNTER — RX RENEWAL (OUTPATIENT)
Age: 88
End: 2023-03-28

## 2023-04-14 ENCOUNTER — RX RENEWAL (OUTPATIENT)
Age: 88
End: 2023-04-14

## 2023-04-25 ENCOUNTER — APPOINTMENT (OUTPATIENT)
Dept: HEART AND VASCULAR | Facility: CLINIC | Age: 88
End: 2023-04-25
Payer: MEDICARE

## 2023-04-25 ENCOUNTER — NON-APPOINTMENT (OUTPATIENT)
Age: 88
End: 2023-04-25

## 2023-04-25 VITALS
BODY MASS INDEX: 23.19 KG/M2 | HEART RATE: 73 BPM | SYSTOLIC BLOOD PRESSURE: 117 MMHG | DIASTOLIC BLOOD PRESSURE: 65 MMHG | HEIGHT: 73 IN | WEIGHT: 175 LBS

## 2023-04-25 PROCEDURE — 93280 PM DEVICE PROGR EVAL DUAL: CPT

## 2023-05-02 NOTE — PHYSICAL EXAM
[General Appearance - Well Developed] : well developed [Normal Appearance] : normal appearance [Well Groomed] : well groomed [General Appearance - Well Nourished] : well nourished [No Deformities] : no deformities [General Appearance - In No Acute Distress] : no acute distress [Heart Rate And Rhythm] : heart rate and rhythm were normal [Heart Sounds] : normal S1 and S2 [Murmurs] : no murmurs present [Respiration, Rhythm And Depth] : normal respiratory rhythm and effort [Exaggerated Use Of Accessory Muscles For Inspiration] : no accessory muscle use [Auscultation Breath Sounds / Voice Sounds] : lungs were clear to auscultation bilaterally [Left Infraclavicular] : left infraclavicular area [Clean] : clean [Dry] : dry [Well-Healed] : well-healed [Bleeding] : no active bleeding [Palpable Crepitus] : no palpable crepitus [Foul Odor] : no foul smell [Purulent Drainage] : no purulent drainage [Serosanguineous Drainage] : no serosanquineous drainage [Serous Drainage] : no serous drainage [Erythema] : not erythematous [Warm] : not warm [Tender] : not tender [Indurated] : not indurated [Fluctuant] : not fluctuant [Abdomen Soft] : soft [Abdomen Tenderness] : non-tender [Abdomen Mass (___ Cm)] : no abdominal mass palpated [Nail Clubbing] : no clubbing of the fingernails [Cyanosis, Localized] : no localized cyanosis [Petechial Hemorrhages (___cm)] : no petechial hemorrhages [] : no ischemic changes

## 2023-05-02 NOTE — PROCEDURE
[Atrial Fibrillation] : atrial fibrillation [Pacemaker] : pacemaker [[place]] : 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

## 2023-05-02 NOTE — END OF VISIT
[FreeTextEntry3] : I, Janny Hankins, am scribing for (in the presence of) Dr. Marr the following sections: HPI, PMH,Family/social history, ROS, Physical Exam, Assessment / Plan. \par \par I, Kishore Marr, personally performed the services described in the documentation, reviewed the documentation recorded by the scribe in my presence and it accurately and completely records my words and actions.  [Time Spent: ___ minutes] : I have spent [unfilled] minutes of time on the encounter.

## 2023-05-02 NOTE — HISTORY OF PRESENT ILLNESS
[None] : The patient complains of no symptoms [Palpitations] : no palpitations [SOB] : no dyspnea [Syncope] : no syncope [Dizziness] : no dizziness [Chest Pain] : no chest pain or discomfort [Shoulder Pain] : no shoulder pain [Pain at Site] : no pain at device site [Erythema at Site] : no erythema at device site [Swelling at Site] : no swelling at device site [de-identified] : 87 yo man with permanent Afib (on Coumadin), HTN, CKD and AV block S/P PPM (original implant 2000, generator change 2/9/09), presents for device follow-up after his second generator change on 5/16/18. He denies device related complaints. He has been pacer dependent in that he paces 100% of the time but there is a slow ventricular escape in the 40's with inhibition. EF has been preserved.  He does note feeling SOB with stair climbing at times - this is a little better with programming to DDIR.  Tolerating Eliquis without bleeding issues.\par \par ECHO 11/2019 EF 70%, mild to mod MR, severely dilated LA, mild to mod conc LVH

## 2023-06-02 ENCOUNTER — APPOINTMENT (OUTPATIENT)
Dept: HEART AND VASCULAR | Facility: CLINIC | Age: 88
End: 2023-06-02

## 2023-06-06 ENCOUNTER — APPOINTMENT (OUTPATIENT)
Dept: HEART AND VASCULAR | Facility: CLINIC | Age: 88
End: 2023-06-06
Payer: MEDICARE

## 2023-06-06 ENCOUNTER — NON-APPOINTMENT (OUTPATIENT)
Age: 88
End: 2023-06-06

## 2023-06-06 ENCOUNTER — APPOINTMENT (OUTPATIENT)
Dept: HEART AND VASCULAR | Facility: CLINIC | Age: 88
End: 2023-06-06

## 2023-06-06 VITALS
HEIGHT: 73 IN | WEIGHT: 165 LBS | HEART RATE: 60 BPM | TEMPERATURE: 96.6 F | OXYGEN SATURATION: 96 % | BODY MASS INDEX: 21.87 KG/M2 | DIASTOLIC BLOOD PRESSURE: 70 MMHG | SYSTOLIC BLOOD PRESSURE: 114 MMHG

## 2023-06-06 DIAGNOSIS — R06.09 OTHER FORMS OF DYSPNEA: ICD-10-CM

## 2023-06-06 PROCEDURE — 93923 UPR/LXTR ART STDY 3+ LVLS: CPT

## 2023-06-06 PROCEDURE — 99213 OFFICE O/P EST LOW 20 MIN: CPT | Mod: 25

## 2023-06-06 PROCEDURE — 93000 ELECTROCARDIOGRAM COMPLETE: CPT

## 2023-06-06 NOTE — PHYSICAL EXAM
[Well Developed] : well developed [Well Nourished] : well nourished [No Acute Distress] : no acute distress [Normal S1, S2] : normal S1, S2 [Clear Lung Fields] : clear lung fields [Good Air Entry] : good air entry [No Respiratory Distress] : no respiratory distress  [Normal Gait] : normal gait [No Edema] : no edema [Normal Radial B/L] : normal radial B/L [Normal PT B/L] : normal PT B/L [Normal DP B/L] : normal DP B/L [Moves all extremities] : moves all extremities [No Focal Deficits] : no focal deficits [Normal Speech] : normal speech [Alert and Oriented] : alert and oriented [Normal memory] : normal memory [de-identified] : bilateral bruits [de-identified] : 2/6 systolic murmur heard throughout

## 2023-06-06 NOTE — DISCUSSION/SUMMARY
[FreeTextEntry1] : 88 year old male with PMHX of AFIB, PPM, AS ( mod on echo 2023, peak gradient at 40 mmHg and valve at 1.1 sqm) Thoracic Aortic Aneurysm, PAD ( Iliac Aneurysm), HTN and CKD here for follow up\par \par CVD Risk: Active. Has no exertional chest pains . Significant CAD not likely. Will continue with risk factor optimization. \par  AFIB: Asymptomatic. EKG AFIB, V Paced at 60 bpm, no change. Continue with Metoprolol 50mg QD and Eliquis 5mg BID\par AS: Active without dyspnea. Last echocardiogram, moderate ( 3/2023) Continue with Furosemide 20mg every other day. \par Aortic Aneurysm: AO at 4.4 cm on echo. last AAA screening normal. Continue with BP control Irbesartan and BBlocker\par HTN: Controlled. Continue with Irbesartan but at 75mg and Metoprolol 50mg QD\par HLD: Controlled. LDL goal < 100.  LDL 52 (03/2023). Continue with Atorvastatin 40mg\par Iliac Aneurysm: DESEAN done today to rule out PAD\par \par Follow up in 4 months\par \par I have discussed the case with YING Marvin. I have personally performed a history, physical exam, and my own medical decision making. I have reviewed the note and agree with the findings and plan. \par \par The DESEAN is normal.  No evidence for vascular obstruction.  Plan as above. [EKG obtained to assist in diagnosis and management of assessed problem(s)] : EKG obtained to assist in diagnosis and management of assessed problem(s)

## 2023-06-06 NOTE — REVIEW OF SYSTEMS
[Chills] : no chills [SOB] : no shortness of breath [Chest Discomfort] : no chest discomfort [Lower Ext Edema] : no extremity edema [Leg Claudication] : no intermittent leg claudication [Palpitations] : no palpitations [Orthopnea] : no orthopnea [Cough] : no cough [Nausea] : no nausea [Vomiting] : no vomiting [Diarrhea] : diarrhea [Dizziness] : no dizziness [Numbness (Hypoesthesia)] : no numbness [Weakness] : no weakness [Speech Disturbance] : no speech disturbance [Easy Bleeding] : no tendency for easy bleeding

## 2023-06-06 NOTE — HISTORY OF PRESENT ILLNESS
[FreeTextEntry1] : 88 year old male with PMHX of AFIB, PPM, AS ( mod on echo 2023, peak gradient at 40 mmHg and valve at 1.1 sqm) Thoracic Aortic Aneurysm, PAD ( Iliac Aneurysm), HTN and CKD here for follow up \par \par Since last visit, patient was started on Irbesartan 75mg in the setting of elevated blood pressure. He is tolerating it well. \par \par He continues to stay active by walking every other day 1 - 2 miles. He walks on average 22 blocks but occasionally walks up to 37 blocks without issues. No chest pain. \par \par He denies any pedal edema, PND or orthopnea. Patient denies any palpitations, shortness of breath at rest, dizziness, falls, syncope or neuro focal deficits. \par \par He reports no claudications. \par \par He has no bleeding complications \par \par He will be leaving out of the city and returning end of September this year. \par \par Previous Work Up\par LABS ( 03/24/2023 ) Total Chol 139, HDL 74, LDL 52, Cr 1.32 GFR 53, TSH3.06\par PPM checked 04/25/2023\par ECHO ( 03/23/2023 ) EF 60-65%, mod AS, mod calcification of MV. mild LVH, mild AR, severely dilated left atrium, mod dilated right atrium. Aortic Root at 4.0cm\par AAA screening ( 11/17/2022 ) Plaque noted, no significant stenosis. NO aneurysm. \par Carotid ( 10/07/2021 ) No hemodynamic significance \par DESEAN ( 01/14/2021 ) Normal \par

## 2023-10-31 ENCOUNTER — NON-APPOINTMENT (OUTPATIENT)
Age: 88
End: 2023-10-31

## 2023-10-31 ENCOUNTER — APPOINTMENT (OUTPATIENT)
Dept: HEART AND VASCULAR | Facility: CLINIC | Age: 88
End: 2023-10-31
Payer: MEDICARE

## 2023-10-31 VITALS
BODY MASS INDEX: 21.6 KG/M2 | TEMPERATURE: 97.3 F | WEIGHT: 163 LBS | HEIGHT: 73 IN | HEART RATE: 63 BPM | DIASTOLIC BLOOD PRESSURE: 72 MMHG | SYSTOLIC BLOOD PRESSURE: 131 MMHG

## 2023-10-31 PROCEDURE — 99213 OFFICE O/P EST LOW 20 MIN: CPT

## 2023-10-31 PROCEDURE — 93280 PM DEVICE PROGR EVAL DUAL: CPT

## 2023-11-21 ENCOUNTER — RX RENEWAL (OUTPATIENT)
Age: 88
End: 2023-11-21

## 2023-12-14 ENCOUNTER — APPOINTMENT (OUTPATIENT)
Dept: HEART AND VASCULAR | Facility: CLINIC | Age: 88
End: 2023-12-14
Payer: MEDICARE

## 2023-12-14 VITALS
WEIGHT: 160 LBS | DIASTOLIC BLOOD PRESSURE: 70 MMHG | SYSTOLIC BLOOD PRESSURE: 108 MMHG | HEIGHT: 73 IN | HEART RATE: 60 BPM | BODY MASS INDEX: 21.2 KG/M2 | OXYGEN SATURATION: 98 % | TEMPERATURE: 97.2 F

## 2023-12-14 DIAGNOSIS — I73.9 PERIPHERAL VASCULAR DISEASE, UNSPECIFIED: ICD-10-CM

## 2023-12-14 DIAGNOSIS — I35.0 NONRHEUMATIC AORTIC (VALVE) STENOSIS: ICD-10-CM

## 2023-12-14 PROCEDURE — 99213 OFFICE O/P EST LOW 20 MIN: CPT | Mod: 25

## 2023-12-14 PROCEDURE — 93000 ELECTROCARDIOGRAM COMPLETE: CPT

## 2023-12-14 NOTE — PHYSICAL EXAM
[Normal Conjunctiva] : normal conjunctiva [Normal Venous Pressure] : normal venous pressure [Normal S1, S2] : normal S1, S2 [Normal Gait] : normal gait [Normal PT B/L] : normal PT B/L [Normal] : alert and oriented, normal memory [de-identified] : bilateral carotid bruit [de-identified] : 3/6 systolic murmur heard throughout with radiation to axilla and carotids [de-identified] : trace edema

## 2023-12-14 NOTE — REASON FOR VISIT
[Arrhythmia/ECG Abnorrmalities] : arrhythmia/ECG abnormalities [Structural Heart and Valve Disease] : structural heart and valve disease [FreeTextEntry1] : 88-year-old male with PMHX of AFIB (on Eliquis), PPM, AS (mod on echo 2023, peak gradient at 40 mmHg and valve at 1.1 sqm) thoracic aortic aneurysm, PAD (Iliac aneurysm), HTN and CKD here for follow up.

## 2023-12-14 NOTE — REVIEW OF SYSTEMS
[Dyspnea on exertion] : dyspnea during exertion [Fever] : no fever [Chills] : no chills [SOB] : no shortness of breath [Chest Discomfort] : no chest discomfort [Lower Ext Edema] : no extremity edema [Leg Claudication] : no intermittent leg claudication [Palpitations] : no palpitations [Orthopnea] : no orthopnea [PND] : no PND [Syncope] : no syncope [Cough] : no cough [Abdominal Pain] : no abdominal pain [Dizziness] : no dizziness

## 2023-12-14 NOTE — ASSESSMENT
[FreeTextEntry1] : 88-year-old male with PMHX of AFIB (on Eliquis), PPM, AS (mod on echo 2023, peak gradient at 40 mmHg and valve at 1.1 sqm) thoracic aortic aneurysm, PAD (Iliac aneurysm), HTN and CKD here for follow up.   ASCVD risk:  - Pt. is active without exertional chest pain  - Last echo (03/2023) revealed normal LVSF with EF 60-65%  - Significant CAD not likely, continue with risk factor optimization  AFIB:  - Asymptomatic - EKG today: V Paced at 60 bpm, no change - Continue oral anticoagulation with Eliquis 5 mg BID - Last TFTs normal - will repeat today.  AS:  - Stable  - Moderate in severity from echo 03/2023. - Continue with Furosemide 20 mg daily   Aortic Aneurysm:  - AO at 4.4 cm on echo 03/2023 -  Last AAA screening normal - Continue strict BP control   HTN:  - Controlled - Continue with Irbesartan 75mg qd and Metoprolol 50mg QD - Labs (03/2023) stable with K/Creat 4.8/1.32 - repeat BMP today for surveillance.   HLD:  - Controlled - LDL goal < 100. LDL 52 (03/2023) - repeat lipids today  - Continue with Atorvastatin 40mg qd  Iliac Aneurysm:  - DESEAN (06/2023) normal - significant PAD not likely   RTC in 4 months for follow-up and echocardiogram.  I have discussed the case with TEJAL Herrera. I have personally performed a history, physical exam, and my own medical decision making. I have reviewed the note and agree with the findings and plan.

## 2023-12-14 NOTE — HISTORY OF PRESENT ILLNESS
[facility] Call Center    Phone Message    May a detailed message be left on voicemail: yes     Reason for Call: Other: call back    Dad reports he had walked away from cell phone for a moment, and missed call from [place]. Please give him a call back, he has his phone again    Action Taken: Message routed to:  Other: nursing pool    Travel Screening: Not Applicable                                                                        
[FreeTextEntry1] : Since his last visit, patient is feeling well overall with no acute issues or concerns.   He does not regularly monitor his BP at home.   Activity: he walks several miles every other day. No exertional chest pain. He admits to [place] dysp[place].   Pt. denies any orthopnea, PND, palpitations, lightheadedness, syncope or lower extremity edema.   PCP: pt. is requesting a new referral.  ==================================== Labs/Diagnostics:   LABS (03/24/2023) Total Chol 139, HDL 74, LDL 52, Cr 1.32 GFR 53, TSH3.06  PPM checked 10/31/2023.  ECHO (03/23/2023) EF 60-65%, mod AS, mod calcification of MV. mild LVH, mild AR, severely dilated left atrium, mod dilated right atrium, aortic root at 4.0cm  AAA screening (11/17/2022) Plaque noted, no significant stenosis. NO aneurysm.  Carotid (10/07/2021) No hemodynamic significance  DESEAN (01/14/2021) Normal

## 2023-12-15 ENCOUNTER — RX RENEWAL (OUTPATIENT)
Age: 88
End: 2023-12-15

## 2023-12-15 LAB
ANION GAP SERPL CALC-SCNC: 11 MMOL/L
BUN SERPL-MCNC: 19 MG/DL
CALCIUM SERPL-MCNC: 9.8 MG/DL
CHLORIDE SERPL-SCNC: 105 MMOL/L
CHOLEST SERPL-MCNC: 151 MG/DL
CO2 SERPL-SCNC: 26 MMOL/L
CREAT SERPL-MCNC: 1.26 MG/DL
EGFR: 55 ML/MIN/1.73M2
ESTIMATED AVERAGE GLUCOSE: 126 MG/DL
GLUCOSE SERPL-MCNC: 123 MG/DL
HBA1C MFR BLD HPLC: 6 %
HCT VFR BLD CALC: 38.6 %
HDLC SERPL-MCNC: 74 MG/DL
HGB BLD-MCNC: 12 G/DL
LDLC SERPL CALC-MCNC: 64 MG/DL
NONHDLC SERPL-MCNC: 77 MG/DL
POTASSIUM SERPL-SCNC: 3.9 MMOL/L
SODIUM SERPL-SCNC: 141 MMOL/L
TRIGL SERPL-MCNC: 70 MG/DL
TSH SERPL-ACNC: 2.45 UIU/ML

## 2023-12-19 ENCOUNTER — RX RENEWAL (OUTPATIENT)
Age: 88
End: 2023-12-19

## 2024-02-15 ENCOUNTER — LABORATORY RESULT (OUTPATIENT)
Age: 89
End: 2024-02-15

## 2024-02-15 ENCOUNTER — APPOINTMENT (OUTPATIENT)
Dept: INTERNAL MEDICINE | Facility: CLINIC | Age: 89
End: 2024-02-15
Payer: MEDICARE

## 2024-02-15 VITALS
DIASTOLIC BLOOD PRESSURE: 77 MMHG | OXYGEN SATURATION: 96 % | BODY MASS INDEX: 22 KG/M2 | TEMPERATURE: 95 F | WEIGHT: 166 LBS | HEIGHT: 73 IN | HEART RATE: 67 BPM | SYSTOLIC BLOOD PRESSURE: 130 MMHG

## 2024-02-15 DIAGNOSIS — R41.3 OTHER AMNESIA: ICD-10-CM

## 2024-02-15 DIAGNOSIS — E78.00 PURE HYPERCHOLESTEROLEMIA, UNSPECIFIED: ICD-10-CM

## 2024-02-15 DIAGNOSIS — Z00.00 ENCOUNTER FOR GENERAL ADULT MEDICAL EXAMINATION W/OUT ABNORMAL FINDINGS: ICD-10-CM

## 2024-02-15 DIAGNOSIS — I10 ESSENTIAL (PRIMARY) HYPERTENSION: ICD-10-CM

## 2024-02-15 DIAGNOSIS — C61 MALIGNANT NEOPLASM OF PROSTATE: ICD-10-CM

## 2024-02-15 DIAGNOSIS — R26.89 OTHER ABNORMALITIES OF GAIT AND MOBILITY: ICD-10-CM

## 2024-02-15 PROCEDURE — 36415 COLL VENOUS BLD VENIPUNCTURE: CPT

## 2024-02-15 PROCEDURE — G0438: CPT

## 2024-02-15 NOTE — HISTORY OF PRESENT ILLNESS
[de-identified] : Mr. LETICIA TORRES is an 88-year-old male with history of thoracic aortic aneurysm, mod AS, CAD, HTN, HLD, Afib on Eliquis, AV block s/ PPM V paced, Prostate CA, and CKD presenting today for CPE.

## 2024-02-15 NOTE — ASSESSMENT
[FreeTextEntry1] : #HCM - discussed healthy diet and exercise - discussed age appropriate cancer screenings and vaccines

## 2024-02-15 NOTE — PHYSICAL EXAM
[Normal Rate] : normal rate  [Regular Rhythm] : with a regular rhythm [No Edema] : there was no peripheral edema [Soft] : abdomen soft [Non Tender] : non-tender [Normal] : affect was normal and insight and judgment were intact [de-identified] : +systolic murmur [de-identified] : +ventral hernia

## 2024-02-16 LAB
25(OH)D3 SERPL-MCNC: 59.4 NG/ML
ALBUMIN SERPL ELPH-MCNC: 4.4 G/DL
ALP BLD-CCNC: 125 U/L
ALT SERPL-CCNC: 36 U/L
ANION GAP SERPL CALC-SCNC: 11 MMOL/L
AST SERPL-CCNC: 35 U/L
BASOPHILS # BLD AUTO: 0.04 K/UL
BASOPHILS NFR BLD AUTO: 0.7 %
BILIRUB SERPL-MCNC: 0.9 MG/DL
BUN SERPL-MCNC: 23 MG/DL
CALCIUM SERPL-MCNC: 9.2 MG/DL
CHLORIDE SERPL-SCNC: 104 MMOL/L
CO2 SERPL-SCNC: 26 MMOL/L
CREAT SERPL-MCNC: 1.3 MG/DL
CREAT SPEC-SCNC: 112 MG/DL
CREAT/PROT UR: 0.2 RATIO
CYSTATIN C SERPL-MCNC: 1.34 MG/L
EGFR: 53 ML/MIN/1.73M2
EOSINOPHIL # BLD AUTO: 0.09 K/UL
EOSINOPHIL NFR BLD AUTO: 1.7 %
FERRITIN SERPL-MCNC: 201 NG/ML
FOLATE SERPL-MCNC: 7.6 NG/ML
GFR/BSA.PRED SERPLBLD CYS-BASED-ARV: 47 ML/MIN/1.73M2
GLUCOSE SERPL-MCNC: 75 MG/DL
HCT VFR BLD CALC: 38.3 %
HGB BLD-MCNC: 11.7 G/DL
IMM GRANULOCYTES NFR BLD AUTO: 0.4 %
IRON SATN MFR SERPL: 27 %
IRON SERPL-MCNC: 80 UG/DL
LYMPHOCYTES # BLD AUTO: 1.04 K/UL
LYMPHOCYTES NFR BLD AUTO: 19.5 %
MAN DIFF?: NORMAL
MCHC RBC-ENTMCNC: 29.9 PG
MCHC RBC-ENTMCNC: 30.5 GM/DL
MCV RBC AUTO: 98 FL
MONOCYTES # BLD AUTO: 0.5 K/UL
MONOCYTES NFR BLD AUTO: 9.4 %
NEUTROPHILS # BLD AUTO: 3.65 K/UL
NEUTROPHILS NFR BLD AUTO: 68.3 %
PLATELET # BLD AUTO: 172 K/UL
POTASSIUM SERPL-SCNC: 4.2 MMOL/L
PROT SERPL-MCNC: 7.8 G/DL
PROT UR-MCNC: 17 MG/DL
RBC # BLD: 3.91 M/UL
RBC # FLD: 15 %
SODIUM SERPL-SCNC: 141 MMOL/L
T PALLIDUM AB SER QL IA: NEGATIVE
TIBC SERPL-MCNC: 296 UG/DL
UIBC SERPL-MCNC: 215 UG/DL
VIT B12 SERPL-MCNC: 539 PG/ML
WBC # FLD AUTO: 5.34 K/UL

## 2024-02-20 RX ORDER — APIXABAN 5 MG/1
5 TABLET, FILM COATED ORAL
Qty: 180 | Refills: 3 | Status: ACTIVE | COMMUNITY
Start: 2021-01-27 | End: 1900-01-01

## 2024-02-20 RX ORDER — ATORVASTATIN CALCIUM 40 MG/1
40 TABLET, FILM COATED ORAL
Qty: 90 | Refills: 3 | Status: ACTIVE | COMMUNITY
Start: 2020-03-22 | End: 1900-01-01

## 2024-02-29 DIAGNOSIS — D47.2 MONOCLONAL GAMMOPATHY: ICD-10-CM

## 2024-02-29 LAB
ALBUMIN MFR SERPL ELPH: 53.6 %
ALBUMIN SERPL-MCNC: 4 G/DL
ALBUMIN/GLOB SERPL: 1.2 RATIO
ALPHA1 GLOB MFR SERPL ELPH: 4.3 %
ALPHA1 GLOB SERPL ELPH-MCNC: 0.3 G/DL
ALPHA2 GLOB MFR SERPL ELPH: 9 %
ALPHA2 GLOB SERPL ELPH-MCNC: 0.7 G/DL
B-GLOBULIN MFR SERPL ELPH: 8.6 %
B-GLOBULIN SERPL ELPH-MCNC: 0.6 G/DL
GAMMA GLOB FLD ELPH-MCNC: 1.8 G/DL
GAMMA GLOB MFR SERPL ELPH: 24.5 %
HOMOCYSTEINE LEVEL: 14.5 UMOL/L
INTERPRETATION SERPL IEP-IMP: NORMAL
M PROTEIN MFR SERPL ELPH: 18.8 %
METHYLMALONATE SERPL-SCNC: 239 NMOL/L
MONOCLON BAND OBS SERPL: 1.4 G/DL
PROT SERPL-MCNC: 7.4 G/DL
PROT SERPL-MCNC: 7.4 G/DL

## 2024-03-08 RX ORDER — IRBESARTAN 75 MG/1
75 TABLET ORAL
Qty: 90 | Refills: 3 | Status: ACTIVE | COMMUNITY
Start: 2018-11-15 | End: 1900-01-01

## 2024-03-08 RX ORDER — METOPROLOL SUCCINATE 50 MG/1
50 TABLET, EXTENDED RELEASE ORAL
Qty: 90 | Refills: 1 | Status: ACTIVE | COMMUNITY
Start: 2019-08-09 | End: 1900-01-01

## 2024-03-21 ENCOUNTER — APPOINTMENT (OUTPATIENT)
Dept: HEART AND VASCULAR | Facility: CLINIC | Age: 89
End: 2024-03-21
Payer: MEDICARE

## 2024-03-21 ENCOUNTER — NON-APPOINTMENT (OUTPATIENT)
Age: 89
End: 2024-03-21

## 2024-03-21 VITALS
BODY MASS INDEX: 21.2 KG/M2 | WEIGHT: 160 LBS | HEART RATE: 68 BPM | OXYGEN SATURATION: 98 % | DIASTOLIC BLOOD PRESSURE: 70 MMHG | HEIGHT: 73 IN | SYSTOLIC BLOOD PRESSURE: 118 MMHG

## 2024-03-21 DIAGNOSIS — I71.20 THORACIC AORTIC ANEURYSM, WITHOUT RUPTURE, UNSPECIFIED: ICD-10-CM

## 2024-03-21 DIAGNOSIS — R06.89 OTHER ABNORMALITIES OF BREATHING: ICD-10-CM

## 2024-03-21 PROCEDURE — 93000 ELECTROCARDIOGRAM COMPLETE: CPT

## 2024-03-21 PROCEDURE — 93306 TTE W/DOPPLER COMPLETE: CPT

## 2024-03-21 PROCEDURE — 99214 OFFICE O/P EST MOD 30 MIN: CPT | Mod: 25

## 2024-03-21 NOTE — HISTORY OF PRESENT ILLNESS
[FreeTextEntry1] : The patient walks 1 to 2 miles with stopped.  At best he walks 8-10 blocks and stops but not because of dyspnea.  His dyspnea on an incline and needs to pause.  He has had 1 flight of stairs dyspnea for years.

## 2024-03-21 NOTE — PHYSICAL EXAM
[Well Developed] : well developed [Well Nourished] : well nourished [No Acute Distress] : no acute distress [Normal Conjunctiva] : normal conjunctiva [Normal S1, S2] : normal S1, S2 [Clear Lung Fields] : clear lung fields [Good Air Entry] : good air entry [No Respiratory Distress] : no respiratory distress  [Normal Radial B/L] : normal radial B/L [Normal PT B/L] : normal PT B/L [Normal DP B/L] : normal DP B/L [Venous varicosities] : venous varicosities [Moves all extremities] : moves all extremities [No Focal Deficits] : no focal deficits [Normal Speech] : normal speech [Alert and Oriented] : alert and oriented [Normal memory] : normal memory [de-identified] : Bilateral carotid bruit vs radiation of cardiac murmur  [de-identified] : Grade 2-3/6 systolic murmur heard throughout  [de-identified] : 1+ edema LLE

## 2024-03-21 NOTE — DISCUSSION/SUMMARY
[FreeTextEntry1] : The patient has dyspnea on exertion.  However is stable and level of exertion is appropriate for his age.  EKG shows atrial fibrillation with normal pacemaker function.  Echocardiogram reveals moderate aortic stenosis with an ejection fraction of 60%.  Aortic root is stable.  Aortic stenosis not hemodynamically significant.  Patient has edema on the left.  He will return for venous Doppler.  His cholesterol has been good.  He will continue atorvastatin. [EKG obtained to assist in diagnosis and management of assessed problem(s)] : EKG obtained to assist in diagnosis and management of assessed problem(s)

## 2024-04-30 ENCOUNTER — NON-APPOINTMENT (OUTPATIENT)
Age: 89
End: 2024-04-30

## 2024-04-30 ENCOUNTER — APPOINTMENT (OUTPATIENT)
Dept: HEART AND VASCULAR | Facility: CLINIC | Age: 89
End: 2024-04-30
Payer: MEDICARE

## 2024-04-30 VITALS
HEIGHT: 73 IN | DIASTOLIC BLOOD PRESSURE: 70 MMHG | SYSTOLIC BLOOD PRESSURE: 124 MMHG | BODY MASS INDEX: 22.26 KG/M2 | WEIGHT: 168 LBS | HEART RATE: 66 BPM

## 2024-04-30 DIAGNOSIS — I44.30 UNSPECIFIED ATRIOVENTRICULAR BLOCK: ICD-10-CM

## 2024-04-30 DIAGNOSIS — I48.91 UNSPECIFIED ATRIAL FIBRILLATION: ICD-10-CM

## 2024-04-30 DIAGNOSIS — Z95.0 PRESENCE OF CARDIAC PACEMAKER: ICD-10-CM

## 2024-04-30 PROCEDURE — 93280 PM DEVICE PROGR EVAL DUAL: CPT

## 2024-04-30 PROCEDURE — 99212 OFFICE O/P EST SF 10 MIN: CPT | Mod: 25

## 2024-04-30 NOTE — ADDENDUM
[FreeTextEntry1] : I, Dilia Reyes, am scribing for and the presence of Dr. Marr the following sections: HPI, PMH,Family/social history, ROS, Physical Exam, Assessment / Plan.  I, Kishore Marr, personally performed the services described in the documentation, reviewed the documentation recorded by the scribe in my presence and it accurately and completely records my words and actions.

## 2024-04-30 NOTE — HISTORY OF PRESENT ILLNESS
[None] : The patient complains of no symptoms [Palpitations] : no palpitations [SOB] : no dyspnea [Syncope] : no syncope [Dizziness] : no dizziness [Chest Pain] : no chest pain or discomfort [Shoulder Pain] : no shoulder pain [Pain at Site] : no pain at device site [Erythema at Site] : no erythema at device site [Swelling at Site] : no swelling at device site [de-identified] : 89 yo man with permanent Afib (was on Coumadin=>now Eliquis), HTN, CKD and AV block S/P PPM (original implant 2000, generator change 2/9/09), presents for device follow-up after his second generator change on 5/16/18. He denies device related complaints. He has been pacer dependent in that he paces 100% of the time but there is a slow ventricular escape in the 40's with inhibition. EF has been preserved.  He does note feeling SOB with stair climbing at times - this is a little better with programming to DDIR.  Tolerating Eliquis without bleeding issues.   EF preserved on Echo 3/2024  SEE PACEART

## 2024-07-09 ENCOUNTER — RX RENEWAL (OUTPATIENT)
Age: 89
End: 2024-07-09

## 2024-08-14 ENCOUNTER — RX RENEWAL (OUTPATIENT)
Age: 89
End: 2024-08-14

## 2024-08-26 ENCOUNTER — RX RENEWAL (OUTPATIENT)
Age: 89
End: 2024-08-26

## 2024-09-17 ENCOUNTER — APPOINTMENT (OUTPATIENT)
Dept: INTERNAL MEDICINE | Facility: CLINIC | Age: 89
End: 2024-09-17
Payer: MEDICARE

## 2024-09-17 VITALS
BODY MASS INDEX: 22.26 KG/M2 | HEIGHT: 73 IN | DIASTOLIC BLOOD PRESSURE: 86 MMHG | SYSTOLIC BLOOD PRESSURE: 138 MMHG | WEIGHT: 168 LBS | OXYGEN SATURATION: 100 % | HEART RATE: 60 BPM

## 2024-09-17 DIAGNOSIS — R41.3 OTHER AMNESIA: ICD-10-CM

## 2024-09-17 DIAGNOSIS — R63.4 ABNORMAL WEIGHT LOSS: ICD-10-CM

## 2024-09-17 DIAGNOSIS — D64.9 ANEMIA, UNSPECIFIED: ICD-10-CM

## 2024-09-17 DIAGNOSIS — R26.89 OTHER ABNORMALITIES OF GAIT AND MOBILITY: ICD-10-CM

## 2024-09-17 PROCEDURE — 36415 COLL VENOUS BLD VENIPUNCTURE: CPT

## 2024-09-17 PROCEDURE — 99215 OFFICE O/P EST HI 40 MIN: CPT

## 2024-09-17 PROCEDURE — G2211 COMPLEX E/M VISIT ADD ON: CPT

## 2024-09-17 RX ORDER — FERROUS SULFATE TAB EC 324 MG (65 MG FE EQUIVALENT) 324 (65 FE) MG
324 (65 FE) TABLET DELAYED RESPONSE ORAL
Qty: 90 | Refills: 1 | Status: ACTIVE | COMMUNITY
Start: 2024-09-17 | End: 1900-01-01

## 2024-09-17 NOTE — HISTORY OF PRESENT ILLNESS
[FreeTextEntry8] : 89-year-old male with past medical history of thoracic aortic aneurysm, mod AS, CAD, HTN, HLD, Afib on Eliquis, AV block s/ PPM V paced, Prostate CA, and CKD comes to clinic for an acute visit.  Patient was seen for his complaints of balance issue, memory impairment and brain fog and was recommended to follow-up with neurology and physical therapy for further management but he did not do so.  His initial workup showed anemia but did not show any other acute finding that might explain the symptoms.

## 2024-09-17 NOTE — PHYSICAL EXAM
[No Acute Distress] : no acute distress [Normal Sclera/Conjunctiva] : normal sclera/conjunctiva [PERRL] : pupils equal round and reactive to light [Normal Outer Ear/Nose] : the outer ears and nose were normal in appearance [Normal Oropharynx] : the oropharynx was normal [No Respiratory Distress] : no respiratory distress  [No Accessory Muscle Use] : no accessory muscle use [Clear to Auscultation] : lungs were clear to auscultation bilaterally [Normal Rate] : normal rate  [Soft] : abdomen soft [Non Tender] : non-tender [Non-distended] : non-distended [No CVA Tenderness] : no CVA  tenderness [No Spinal Tenderness] : no spinal tenderness [Grossly Normal Strength/Tone] : grossly normal strength/tone [No Rash] : no rash [Normal] : no rash [Coordination Grossly Intact] : coordination grossly intact [No Focal Deficits] : no focal deficits [Normal Affect] : the affect was normal [Normal Insight/Judgement] : insight and judgment were intact

## 2024-09-17 NOTE — REVIEW OF SYSTEMS
[Dizziness] : dizziness [Fainting] : no fainting [Confusion] : no confusion [Unsteady Walk] : ataxia [Memory Loss] : memory loss [Negative] : Heme/Lymph

## 2024-09-18 LAB
FERRITIN SERPL-MCNC: 167 NG/ML
FOLATE SERPL-MCNC: 19.8 NG/ML
IRON SATN MFR SERPL: 24 %
IRON SERPL-MCNC: 78 UG/DL
TIBC SERPL-MCNC: 332 UG/DL
TRANSFERRIN SERPL-MCNC: 272 MG/DL
UIBC SERPL-MCNC: 254 UG/DL
VIT B12 SERPL-MCNC: 924 PG/ML

## 2024-09-19 ENCOUNTER — NON-APPOINTMENT (OUTPATIENT)
Age: 89
End: 2024-09-19

## 2024-09-19 ENCOUNTER — OUTPATIENT (OUTPATIENT)
Dept: OUTPATIENT SERVICES | Facility: HOSPITAL | Age: 89
LOS: 1 days | End: 2024-09-19
Payer: MEDICARE

## 2024-09-19 ENCOUNTER — APPOINTMENT (OUTPATIENT)
Dept: CT IMAGING | Facility: HOSPITAL | Age: 89
End: 2024-09-19

## 2024-09-19 PROCEDURE — 70450 CT HEAD/BRAIN W/O DYE: CPT

## 2024-09-19 PROCEDURE — 70450 CT HEAD/BRAIN W/O DYE: CPT | Mod: 26,MH

## 2024-09-27 ENCOUNTER — APPOINTMENT (OUTPATIENT)
Dept: HEART AND VASCULAR | Facility: CLINIC | Age: 89
End: 2024-09-27
Payer: MEDICARE

## 2024-09-27 ENCOUNTER — NON-APPOINTMENT (OUTPATIENT)
Age: 89
End: 2024-09-27

## 2024-09-27 VITALS
DIASTOLIC BLOOD PRESSURE: 85 MMHG | WEIGHT: 163 LBS | SYSTOLIC BLOOD PRESSURE: 152 MMHG | OXYGEN SATURATION: 97 % | HEART RATE: 74 BPM | HEIGHT: 73 IN | BODY MASS INDEX: 21.6 KG/M2

## 2024-09-27 DIAGNOSIS — R60.0 LOCALIZED EDEMA: ICD-10-CM

## 2024-09-27 DIAGNOSIS — I82.890 ACUTE EMBOLISM AND THROMBOSIS OF OTHER SPECIFIED VEINS: ICD-10-CM

## 2024-09-27 DIAGNOSIS — Z95.0 PRESENCE OF CARDIAC PACEMAKER: ICD-10-CM

## 2024-09-27 DIAGNOSIS — I71.20 THORACIC AORTIC ANEURYSM, WITHOUT RUPTURE, UNSPECIFIED: ICD-10-CM

## 2024-09-27 DIAGNOSIS — I48.91 UNSPECIFIED ATRIAL FIBRILLATION: ICD-10-CM

## 2024-09-27 PROCEDURE — 93970 EXTREMITY STUDY: CPT

## 2024-09-27 PROCEDURE — 99214 OFFICE O/P EST MOD 30 MIN: CPT | Mod: 25

## 2024-09-27 PROCEDURE — 93000 ELECTROCARDIOGRAM COMPLETE: CPT

## 2024-09-27 NOTE — PHYSICAL EXAM
[Well Developed] : well developed [Well Nourished] : well nourished [No Acute Distress] : no acute distress [Normal Conjunctiva] : normal conjunctiva [Normal S1, S2] : normal S1, S2 [Clear Lung Fields] : clear lung fields [Good Air Entry] : good air entry [No Respiratory Distress] : no respiratory distress  [Normal Radial B/L] : normal radial B/L [Normal PT B/L] : normal PT B/L [Normal DP B/L] : normal DP B/L [Venous stasis] : venous stasis [Venous varicosities] : venous varicosities [Moves all extremities] : moves all extremities [No Focal Deficits] : no focal deficits [Normal Speech] : normal speech [Alert and Oriented] : alert and oriented [Normal memory] : normal memory [de-identified] : Bilateral carotid bruit vs radiation of cardiac murmur  [de-identified] : Grade 2-3/6 systolic murmur heard throughout  [de-identified] : 1+ edema

## 2024-09-27 NOTE — HISTORY OF PRESENT ILLNESS
[FreeTextEntry1] : The patient has noted edema.  He can walk more than 2 miles.  He has felt off balance.

## 2024-09-27 NOTE — DISCUSSION/SUMMARY
[FreeTextEntry1] : The patient has noted edema.  He has a chronic superficial vein thrombosis.  He will elevate his legs.  He is on anticoagulation.  EKG shows atrial fibrillation and ventricular pacing.  He will follow-up with electrophysiologist.  He will return in 6 months for an echocardiogram.  He will check his blood pressure at home.  His medication may need to be adjusted.

## 2024-10-04 ENCOUNTER — NON-APPOINTMENT (OUTPATIENT)
Age: 89
End: 2024-10-04

## 2024-10-09 ENCOUNTER — APPOINTMENT (OUTPATIENT)
Dept: HEART AND VASCULAR | Facility: CLINIC | Age: 89
End: 2024-10-09

## 2024-10-09 ENCOUNTER — NON-APPOINTMENT (OUTPATIENT)
Age: 89
End: 2024-10-09

## 2024-10-09 VITALS
HEART RATE: 68 BPM | BODY MASS INDEX: 21.07 KG/M2 | HEIGHT: 73 IN | TEMPERATURE: 97.2 F | WEIGHT: 159 LBS | SYSTOLIC BLOOD PRESSURE: 127 MMHG | DIASTOLIC BLOOD PRESSURE: 90 MMHG

## 2024-10-09 DIAGNOSIS — I48.91 UNSPECIFIED ATRIAL FIBRILLATION: ICD-10-CM

## 2024-10-09 DIAGNOSIS — I44.30 UNSPECIFIED ATRIOVENTRICULAR BLOCK: ICD-10-CM

## 2024-10-09 PROCEDURE — 99213 OFFICE O/P EST LOW 20 MIN: CPT | Mod: 25

## 2024-10-09 PROCEDURE — 93280 PM DEVICE PROGR EVAL DUAL: CPT

## 2024-10-09 PROCEDURE — 99203 OFFICE O/P NEW LOW 30 MIN: CPT | Mod: 25

## 2024-10-24 ENCOUNTER — APPOINTMENT (OUTPATIENT)
Dept: HEART AND VASCULAR | Facility: CLINIC | Age: 89
End: 2024-10-24

## 2024-11-12 ENCOUNTER — APPOINTMENT (OUTPATIENT)
Dept: HEART AND VASCULAR | Facility: CLINIC | Age: 89
End: 2024-11-12

## 2024-12-26 NOTE — REVIEW OF SYSTEMS
[Dyspnea on exertion] : dyspnea during exertion [Lower Ext Edema] : lower extremity edema [Change In Color Of Skin] : change in skin color [Fever] : no fever 77 [Chills] : no chills [SOB] : no shortness of breath [Chest Discomfort] : no chest discomfort [Leg Claudication] : no intermittent leg claudication [Palpitations] : no palpitations [Orthopnea] : no orthopnea [PND] : no PND [Syncope] : no syncope [Cough] : no cough [Abdominal Pain] : no abdominal pain [Nausea] : no nausea [Vomiting] : no vomiting [Heartburn] : no heartburn [Diarrhea] : diarrhea [Blood in stool] : no blood in stoo [Hematuria] : no hematuria [Dizziness] : no dizziness [Numbness (Hypoesthesia)] : no numbness [Weakness] : no weakness [Speech Disturbance] : no speech disturbance [Easy Bleeding] : no tendency for easy bleeding

## 2025-01-14 ENCOUNTER — NON-APPOINTMENT (OUTPATIENT)
Age: 89
End: 2025-01-14

## 2025-01-14 ENCOUNTER — APPOINTMENT (OUTPATIENT)
Dept: INTERNAL MEDICINE | Facility: CLINIC | Age: 89
End: 2025-01-14
Payer: MEDICARE

## 2025-01-14 ENCOUNTER — LABORATORY RESULT (OUTPATIENT)
Age: 89
End: 2025-01-14

## 2025-01-14 VITALS
TEMPERATURE: 97.7 F | WEIGHT: 162.38 LBS | HEIGHT: 73 IN | SYSTOLIC BLOOD PRESSURE: 147 MMHG | DIASTOLIC BLOOD PRESSURE: 79 MMHG | HEART RATE: 65 BPM | OXYGEN SATURATION: 97 % | BODY MASS INDEX: 21.52 KG/M2

## 2025-01-14 DIAGNOSIS — I10 ESSENTIAL (PRIMARY) HYPERTENSION: ICD-10-CM

## 2025-01-14 DIAGNOSIS — N18.9 CHRONIC KIDNEY DISEASE, UNSPECIFIED: ICD-10-CM

## 2025-01-14 DIAGNOSIS — R09.82 POSTNASAL DRIP: ICD-10-CM

## 2025-01-14 DIAGNOSIS — R73.9 HYPERGLYCEMIA, UNSPECIFIED: ICD-10-CM

## 2025-01-14 DIAGNOSIS — E78.00 PURE HYPERCHOLESTEROLEMIA, UNSPECIFIED: ICD-10-CM

## 2025-01-14 DIAGNOSIS — D64.9 ANEMIA, UNSPECIFIED: ICD-10-CM

## 2025-01-14 PROCEDURE — G2211 COMPLEX E/M VISIT ADD ON: CPT

## 2025-01-14 PROCEDURE — 99214 OFFICE O/P EST MOD 30 MIN: CPT

## 2025-01-14 PROCEDURE — 36415 COLL VENOUS BLD VENIPUNCTURE: CPT

## 2025-01-14 PROCEDURE — 93000 ELECTROCARDIOGRAM COMPLETE: CPT

## 2025-01-14 RX ORDER — AZELASTINE HYDROCHLORIDE 137 UG/1
0.1 SPRAY, METERED NASAL TWICE DAILY
Qty: 1 | Refills: 5 | Status: ACTIVE | COMMUNITY
Start: 2025-01-14 | End: 1900-01-01

## 2025-01-16 ENCOUNTER — APPOINTMENT (OUTPATIENT)
Dept: HEART AND VASCULAR | Facility: CLINIC | Age: 89
End: 2025-01-16
Payer: MEDICARE

## 2025-01-16 VITALS
HEIGHT: 73 IN | SYSTOLIC BLOOD PRESSURE: 133 MMHG | HEART RATE: 69 BPM | TEMPERATURE: 97.2 F | BODY MASS INDEX: 21.47 KG/M2 | OXYGEN SATURATION: 97 % | DIASTOLIC BLOOD PRESSURE: 88 MMHG | WEIGHT: 162 LBS

## 2025-01-16 DIAGNOSIS — I35.0 NONRHEUMATIC AORTIC (VALVE) STENOSIS: ICD-10-CM

## 2025-01-16 DIAGNOSIS — R06.09 OTHER FORMS OF DYSPNEA: ICD-10-CM

## 2025-01-16 DIAGNOSIS — I48.91 UNSPECIFIED ATRIAL FIBRILLATION: ICD-10-CM

## 2025-01-16 DIAGNOSIS — I71.20 THORACIC AORTIC ANEURYSM, WITHOUT RUPTURE, UNSPECIFIED: ICD-10-CM

## 2025-01-16 PROCEDURE — 36415 COLL VENOUS BLD VENIPUNCTURE: CPT

## 2025-01-16 PROCEDURE — 93306 TTE W/DOPPLER COMPLETE: CPT

## 2025-01-16 PROCEDURE — ZZZZZ: CPT | Mod: NC

## 2025-01-16 PROCEDURE — 99215 OFFICE O/P EST HI 40 MIN: CPT | Mod: 25

## 2025-01-16 PROCEDURE — 93000 ELECTROCARDIOGRAM COMPLETE: CPT

## 2025-01-17 LAB
APTT BLD: 38.6 SEC
INR PPP: 1.66 RATIO
NT-PROBNP SERPL-MCNC: 4845 PG/ML
PT BLD: 19.6 SEC

## 2025-01-21 LAB
ALBUMIN MFR SERPL ELPH: 52.2 %
ALBUMIN SERPL ELPH-MCNC: 4.1 G/DL
ALBUMIN SERPL-MCNC: 4.2 G/DL
ALBUMIN/GLOB SERPL: 1.1 RATIO
ALP BLD-CCNC: 157 U/L
ALPHA1 GLOB MFR SERPL ELPH: 4.6 %
ALPHA1 GLOB SERPL ELPH-MCNC: 0.4 G/DL
ALPHA2 GLOB MFR SERPL ELPH: 9 %
ALPHA2 GLOB SERPL ELPH-MCNC: 0.7 G/DL
ALT SERPL-CCNC: 35 U/L
ANION GAP SERPL CALC-SCNC: 11 MMOL/L
AST SERPL-CCNC: 40 U/L
B-GLOBULIN MFR SERPL ELPH: 9.2 %
B-GLOBULIN SERPL ELPH-MCNC: 0.7 G/DL
BASOPHILS # BLD AUTO: 0.02 K/UL
BASOPHILS NFR BLD AUTO: 0.4 %
BILIRUB SERPL-MCNC: 1.1 MG/DL
BUN SERPL-MCNC: 27 MG/DL
CALCIUM SERPL-MCNC: 9.3 MG/DL
CHLORIDE SERPL-SCNC: 105 MMOL/L
CHOLEST SERPL-MCNC: 134 MG/DL
CO2 SERPL-SCNC: 24 MMOL/L
CREAT SERPL-MCNC: 1.21 MG/DL
EGFR: 58 ML/MIN/1.73M2
EOSINOPHIL # BLD AUTO: 0.05 K/UL
EOSINOPHIL NFR BLD AUTO: 0.9 %
ESTIMATED AVERAGE GLUCOSE: 120 MG/DL
GAMMA GLOB FLD ELPH-MCNC: 2 G/DL
GAMMA GLOB MFR SERPL ELPH: 25 %
GGT SERPL-CCNC: 261 U/L
GLUCOSE SERPL-MCNC: 186 MG/DL
HBA1C MFR BLD HPLC: 5.8 %
HCT VFR BLD CALC: 40 %
HDLC SERPL-MCNC: 70 MG/DL
HGB BLD-MCNC: 12.6 G/DL
IMM GRANULOCYTES NFR BLD AUTO: 0.4 %
INTERPRETATION SERPL IEP-IMP: NORMAL
LDLC SERPL CALC-MCNC: 51 MG/DL
LYMPHOCYTES # BLD AUTO: 1.09 K/UL
LYMPHOCYTES NFR BLD AUTO: 20.3 %
M PROTEIN MFR SERPL ELPH: 21 %
MAN DIFF?: NORMAL
MCHC RBC-ENTMCNC: 31.1 PG
MCHC RBC-ENTMCNC: 31.5 G/DL
MCV RBC AUTO: 98.8 FL
MONOCLON BAND OBS SERPL: 1.7 G/DL
MONOCYTES # BLD AUTO: 0.41 K/UL
MONOCYTES NFR BLD AUTO: 7.6 %
NEUTROPHILS # BLD AUTO: 3.79 K/UL
NEUTROPHILS NFR BLD AUTO: 70.4 %
NONHDLC SERPL-MCNC: 64 MG/DL
PLATELET # BLD AUTO: 174 K/UL
POTASSIUM SERPL-SCNC: 4.3 MMOL/L
PROT SERPL-MCNC: 7.9 G/DL
PROT SERPL-MCNC: 8.1 G/DL
PROT SERPL-MCNC: 8.1 G/DL
RBC # BLD: 4.05 M/UL
RBC # FLD: 14.3 %
SODIUM SERPL-SCNC: 140 MMOL/L
TRIGL SERPL-MCNC: 61 MG/DL
TSH SERPL-ACNC: 3.21 UIU/ML
WBC # FLD AUTO: 5.38 K/UL

## 2025-02-03 ENCOUNTER — APPOINTMENT (OUTPATIENT)
Dept: CARDIOTHORACIC SURGERY | Facility: CLINIC | Age: 89
End: 2025-02-03
Payer: MEDICARE

## 2025-02-03 ENCOUNTER — RESULT REVIEW (OUTPATIENT)
Age: 89
End: 2025-02-03

## 2025-02-03 ENCOUNTER — NON-APPOINTMENT (OUTPATIENT)
Age: 89
End: 2025-02-03

## 2025-02-03 ENCOUNTER — OUTPATIENT (OUTPATIENT)
Dept: OUTPATIENT SERVICES | Facility: HOSPITAL | Age: 89
LOS: 1 days | End: 2025-02-03
Payer: MEDICARE

## 2025-02-03 ENCOUNTER — APPOINTMENT (OUTPATIENT)
Dept: ULTRASOUND IMAGING | Facility: HOSPITAL | Age: 89
End: 2025-02-03

## 2025-02-03 VITALS
TEMPERATURE: 97.6 F | SYSTOLIC BLOOD PRESSURE: 125 MMHG | WEIGHT: 161 LBS | BODY MASS INDEX: 21.34 KG/M2 | HEIGHT: 73 IN | DIASTOLIC BLOOD PRESSURE: 65 MMHG | OXYGEN SATURATION: 95 % | HEART RATE: 64 BPM

## 2025-02-03 PROCEDURE — 74174 CTA ABD&PLVS W/CONTRAST: CPT | Mod: MH

## 2025-02-03 PROCEDURE — 74174 CTA ABD&PLVS W/CONTRAST: CPT | Mod: 26

## 2025-02-03 PROCEDURE — 99215 OFFICE O/P EST HI 40 MIN: CPT

## 2025-02-03 PROCEDURE — 93880 EXTRACRANIAL BILAT STUDY: CPT | Mod: 26

## 2025-02-03 PROCEDURE — 82565 ASSAY OF CREATININE: CPT

## 2025-02-03 PROCEDURE — 99203 OFFICE O/P NEW LOW 30 MIN: CPT

## 2025-02-03 PROCEDURE — 93880 EXTRACRANIAL BILAT STUDY: CPT

## 2025-02-03 PROCEDURE — 75573 CT HRT C+ STRUX CGEN HRT DS: CPT

## 2025-02-03 PROCEDURE — 71275 CT ANGIOGRAPHY CHEST: CPT | Mod: MH

## 2025-02-03 PROCEDURE — 71275 CT ANGIOGRAPHY CHEST: CPT | Mod: 26

## 2025-02-03 PROCEDURE — 75573 CT HRT C+ STRUX CGEN HRT DS: CPT | Mod: 26

## 2025-02-04 ENCOUNTER — APPOINTMENT (OUTPATIENT)
Dept: ULTRASOUND IMAGING | Facility: HOSPITAL | Age: 89
End: 2025-02-04

## 2025-02-04 ENCOUNTER — APPOINTMENT (OUTPATIENT)
Dept: CT IMAGING | Facility: HOSPITAL | Age: 89
End: 2025-02-04

## 2025-02-05 ENCOUNTER — APPOINTMENT (OUTPATIENT)
Dept: CARDIOTHORACIC SURGERY | Facility: CLINIC | Age: 89
End: 2025-02-05
Payer: MEDICARE

## 2025-02-05 PROCEDURE — 99214 OFFICE O/P EST MOD 30 MIN: CPT | Mod: 2W

## 2025-02-12 RX ORDER — ATORVASTATIN CALCIUM 80 MG/1
1 TABLET, FILM COATED ORAL
Refills: 0 | DISCHARGE

## 2025-02-12 RX ORDER — METOPROLOL SUCCINATE 25 MG
1 TABLET, EXTENDED RELEASE 24 HR ORAL
Refills: 0 | DISCHARGE

## 2025-02-12 RX ORDER — FERROUS SULFATE 325(65) MG
0 TABLET ORAL
Refills: 0 | DISCHARGE

## 2025-02-12 RX ORDER — ENOXAPARIN SODIUM 100 MG/ML
100 INJECTION, SOLUTION SUBCUTANEOUS DAILY
Qty: 1 | Refills: 0 | Status: ACTIVE | COMMUNITY
Start: 2025-02-12 | End: 1900-01-01

## 2025-02-12 RX ORDER — APIXABAN 5 MG/1
1 TABLET, FILM COATED ORAL
Refills: 0 | DISCHARGE

## 2025-02-12 NOTE — H&P ADULT - RESPIRATORY
normal/clear to auscultation bilaterally/no wheezes/no rales/no rhonchi normal/no wheezes/no rales/no rhonchi

## 2025-02-12 NOTE — H&P ADULT - NSICDXPASTMEDICALHX_GEN_ALL_CORE_FT
PAST MEDICAL HISTORY:  Aortic stenosis     Atrial fibrillation     Carotid bruit     HLD (hyperlipidemia)     HTN (hypertension)     PVD (peripheral vascular disease)

## 2025-02-12 NOTE — H&P ADULT - CARDIOVASCULAR
normal/S1 S2 present/no gallops/no rub/no murmur/no JVD/no pedal edema/Irregularly irregular rhythm/murmur/vascular

## 2025-02-12 NOTE — H&P ADULT - NSHPLABSRESULTS_GEN_ALL_CORE
EKG: paced, wide complex EKG: paced, wide complex                              EKG: EKG: paced, wide complex                                12.7   5.01  )-----------( 173      ( 14 Feb 2025 06:40 )             40.6       02-14    136  |  98  |  27[H]  ----------------------------<  102[H]  4.0   |  26  |  1.27    Ca    9.6      14 Feb 2025 07:17  Mg     1.9     02-14    TPro  9.4[H]  /  Alb  4.5  /  TBili  0.8  /  DBili  x   /  AST  36  /  ALT  29  /  AlkPhos  151[H]  02-14      PT/INR - ( 14 Feb 2025 06:40 )   PT: 13.2 sec;   INR: 1.15          PTT - ( 14 Feb 2025 06:40 )  PTT:36.3 sec    CARDIAC MARKERS ( 14 Feb 2025 07:17 )  x     / x     / x     / x     / 5.9 ng/mL        Urinalysis Basic - ( 14 Feb 2025 07:17 )    Color: x / Appearance: x / SG: x / pH: x  Gluc: 102 mg/dL / Ketone: x  / Bili: x / Urobili: x   Blood: x / Protein: x / Nitrite: x   Leuk Esterase: x / RBC: x / WBC x   Sq Epi: x / Non Sq Epi: x / Bacteria: x

## 2025-02-12 NOTE — H&P ADULT - HISTORY OF PRESENT ILLNESS
Cardio: Dr. Minaya  Pharmacy:  Escort:    89 year old male with family history of CAD and a past medical history of hypertension, hyperlipidemia, Atrial Fibrillation (currently on Eliquis), PPM ([facility] in 2018 followed by Dr. Galvez), Thoracic aortic aneurysm, history of Prostate CA, chronic kidney disease (creatine 1.21), chronic diastolic heart failure with severe aortic stenosis (PV 4.11 MG 43) who was referred for further evaluation of his valvular disease. The patient recently seen his cardiologist, Dr. Minaya complaining of worsening dyspnea for the last few months. Patient experiences dyspnea after walking 1/2 block (Patient used to walk a few miles a day). Patient also admits to LE edema which is improved with Lasix. He adds he is not sleeping well either and feels fatigued the next day. No reports of snoring. He denies any SOB at rest, chest pain, palpitations, dizziness, or synco  pe.     In light of pts risk factors, CCS class _ anginal symptoms and abnormal Echo, pt now presents to [facility] for recommended cardiac catheterization with possible intervention if clinically indicated. Pt will also get a JORDANA @ 8am to assess THOR clot prior to [facility].     - TTE 1/16/2025- EF 55 %. No RWMA, Severe LVH, LA severely dilated, RA is dilated, Aortic root at the sinuses of Valsalva is dilated, measuring 4.30 cm (indexed 2.19 cm/m), mild-mod MR, Mod AR, Severe AS. Moderate pulmonary htn.   - EKG on 1/15/2025- PACED, wide complex.  - CT head on 09/23/2024 (ordered for "brain fog") 1. Moderate global cerebral atrophy and chronic vessel ischemic changes in other abnormality.2. Right eye lens appears hyperdense, correlate clinically for the presence of cataract.  - Carotid US 2/3/25: No significant hemodynamic stenosis of either carotid artery.  - CT Heart congenital: 2/3/25  Severe stenosis of distal RCA. There is a left atrial appendage clot. Trileaflet calcified aortic valve.  - CTA chest: Thoracic aortic aneurysm, measuring up to 4.4 cm in the aortic root. Heavily calcified aortic valve, a finding often associated with AS, Moderate-sized right pleural effusion. A tubular opacity in the right upper lobe could either be a lung nodule, mucus plugging, or atelectasis.  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           Cardio: Dr. Minaya  Pharmacy:  Escort:    89 year old male with family history of CAD and PMH of HTN, HLD, Atrial Fibrillation (currently on Eliquis), PPM ([facility] in 2018 followed by Dr. Galvez), Thoracic aortic aneurysm, history of Prostate CA, CKD (creatine 1.21), chronic diastolic heart failure with severe AS (PV 4.11 MG 43) who recently seen his cardiologist, Dr. Minaya complaining of worsening dyspnea for the last few months. Patient experiences dyspnea after walking 1/2 block (Patient used to walk a few miles a day). Patient also admits to LE edema which is improved with Lasix. He adds he is not sleeping well either and feels fatigued the next day. Pt was then referred for further evaluation of his valvular disease w/ Dr. Garrison. No reports of snoring. He denies any SOB at rest, chest pain, palpitations, dizziness, or syncope.     In light of pts risk factors, CCS classIII anginal symptoms and abnormal CT angio, pt now presents to [facility] for recommended cardiac catheterization with possible intervention if clinically indicated. Pt will also get a JORDANA @ 8am to assess THOR clot prior to [facility].     - TTE 1/16/2025- EF 55 %. No RWMA, Severe LVH, LA severely dilated, RA is dilated, Aortic root at the sinuses of Valsalva is dilated, measuring 4.30 cm (indexed 2.19 cm/m), mild-mod MR, Mod AR, Severe AS. Moderate pulmonary htn.   - EKG on 1/15/2025- PACED, wide complex.  - CT head on 09/23/2024 (ordered for "brain fog") 1. Moderate global cerebral atrophy and chronic vessel ischemic changes in other abnormality.2. Right eye lens appears hyperdense, correlate clinically for the presence of cataract.  - Carotid US 2/3/25: No significant hemodynamic stenosis of either carotid artery.  - CT Heart congenital: 2/3/25  Severe stenosis of distal RCA. There is a left atrial appendage clot. Trileaflet calcified aortic valve.  - CTA chest: Thoracic aortic aneurysm, measuring up to 4.4 cm in the aortic root. Heavily calcified aortic valve, a finding often associated with AS, Moderate-sized right pleural effusion. A tubular opacity in the right upper lobe could either be a lung nodule, mucus plugging, or atelectasis.  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           Cardio: Dr. Minaya  Pharmacy: [facility] at [address] [facility]  Escort: wife    89 year old male with family history of CAD and PMH of HTN, HLD, Atrial Fibrillation (currently on Eliquis, last dose 2/12/25  PM), PPM ([facility] in 2018 followed by Dr. Galvez, last checked a few months ago), Thoracic aortic aneurysm, history of Prostate CA, CKD (creatine 1.21), chronic diastolic heart failure with severe AS (PV 4.11 MG 43) who recently seen his cardiologist, Dr. Minaya complaining of worsening dyspnea for the last few months. Patient experiences dyspnea after walking 1/2 block (Patient used to walk a few miles a day). Patient also admits to LE edema which is improved with Lasix. He adds he is not sleeping well either and feels fatigued the next day. Pt was then referred for further evaluation of his valvular disease w/ Dr. Garrison. No reports of snoring. He denies any SOB at rest, chest pain, palpitations, dizziness, or syncope.     In light of pts risk factors, CCS classIII anginal symptoms and abnormal CT angio, pt now presents to [facility] for recommended cardiac catheterization. Pt will also get a JORDANA @ 8am to assess THOR clot prior to [facility].     - TTE 1/16/2025- EF 55 %. No RWMA, Severe LVH, LA severely dilated, RA is dilated, Aortic root at the sinuses of Valsalva is dilated, measuring 4.30 cm (indexed 2.19 cm/m), mild-mod MR, Mod AR, Severe AS. Moderate pulmonary htn.   - EKG on 1/15/2025- PACED, wide complex.  - CT head on 09/23/2024 (ordered for "brain fog") 1. Moderate global cerebral atrophy and chronic vessel ischemic changes in other abnormality.2. Right eye lens appears hyperdense, correlate clinically for the presence of cataract.  - Carotid US 2/3/25: No significant hemodynamic stenosis of either carotid artery.  - CT Heart congenital: 2/3/25  Severe stenosis of distal RCA. There is a left atrial appendage clot. Trileaflet calcified aortic valve.  - CTA chest: Thoracic aortic aneurysm, measuring up to 4.4 cm in the aortic root. Heavily calcified aortic valve, a finding often associated with AS, Moderate-sized right pleural effusion. A tubular opacity in the right upper lobe could either be a lung nodule, mucus plugging, or atelectasis.  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           Cardio: Dr. Minaya  Pharmacy: [facility] at [address] [facility]  Escort: wife    89 year old male with family history of CAD and PMH of HTN, HLD, Atrial Fibrillation (currently on Eliquis, last dose 2/12/25  PM, took lovenox SQ 2/13 for THOR clot seen on congenital CT heart), PPM ([facility] in 2018 followed by Dr. Galvez, last checked a few months ago), Thoracic aortic aneurysm, history of Prostate CA, CKD (creatine 1.21), chronic diastolic heart failure with severe AS (PV 4.11 MG 43) who recently seen his cardiologist, Dr. Minaya complaining of worsening dyspnea for the last few months. Patient experiences dyspnea after walking 1/2 block (Patient used to walk a few miles a day). Patient also admits to LE edema which is improved with Lasix. He adds he is not sleeping well either and feels fatigued the next day. Pt was then referred for further evaluation of his valvular disease w/ Dr. Garrison. No reports of snoring. He denies any SOB at rest, chest pain, palpitations, dizziness, or syncope.     In light of pts risk factors, CCS classIII anginal symptoms and abnormal CT angio, pt now presents to [facility] for recommended cardiac catheterization. Pt will also get a JORDANA @ 8am to assess THOR clot prior to [facility].     - TTE 1/16/2025- EF 55 %. No RWMA, Severe LVH, LA severely dilated, RA is dilated, Aortic root at the sinuses of Valsalva is dilated, measuring 4.30 cm (indexed 2.19 cm/m), mild-mod MR, Mod AR, Severe AS. Moderate pulmonary htn.   - EKG on 1/15/2025- PACED, wide complex.  - CT head on 09/23/2024 (ordered for "brain fog") 1. Moderate global cerebral atrophy and chronic vessel ischemic changes in other abnormality.2. Right eye lens appears hyperdense, correlate clinically for the presence of cataract.  - Carotid US 2/3/25: No significant hemodynamic stenosis of either carotid artery.  - CT Heart congenital: 2/3/25  Severe stenosis of distal RCA. There is a left atrial appendage clot. Trileaflet calcified aortic valve.  - CTA chest: Thoracic aortic aneurysm, measuring up to 4.4 cm in the aortic root. Heavily calcified aortic valve, a finding often associated with AS, Moderate-sized right pleural effusion. A tubular opacity in the right upper lobe could either be a lung nodule, mucus plugging, or atelectasis.  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           Cardio: Dr. Minaya  Pharmacy: [facility] at [address] [facility]  Escort: wife    89 year old male with family history of CAD and PMH of HTN, HLD, Atrial Fibrillation (currently on Eliquis, last dose 2/12/25  PM, took lovenox SQ 2/13 for THOR clot seen on congenital CT heart), PPM ([facility] in 2018 followed by Dr. Galvez, last checked a few months ago), Thoracic aortic aneurysm, history of Prostate CA, CKD (creatine 1.21), chronic diastolic heart failure with severe AS (PV 4.11 MG 43) who recently seen his cardiologist, Dr. Minaya complaining of worsening dyspnea for the last few months. Patient experiences dyspnea after walking 1/2 block (Patient used to walk a few miles a day). Patient also admits to LE edema which is improved with Lasix. He adds he is not sleeping well either and feels fatigued the next day. Pt was then referred for further evaluation of his valvular disease w/ Dr. Garrison. No reports of snoring. He denies any SOB at rest, chest pain, palpitations, dizziness, or syncope.     In light of pts risk factors, CCS classIII anginal symptoms and abnormal CT angio, pt now presents to [facility] for recommended cardiac catheterization. Pt will also get a JORDANA @ 8am to assess THOR clot prior to [facility].     - TTE 1/16/2025- EF 55 %. No RWMA, Severe LVH, LA severely dilated, RA is dilated, Aortic root at the sinuses of Valsalva is dilated, measuring 4.30 cm (indexed 2.19 cm/m), mild-mod MR, Mod AR, Severe AS. Moderate pulmonary htn.   - EKG on 1/15/2025- PACED, wide complex.  - CT head on 09/23/2024 (ordered for "brain fog") 1. Moderate global cerebral atrophy and chronic vessel ischemic changes in other abnormality.2. Right eye lens appears hyperdense, correlate clinically for the presence of cataract.  - Carotid US 2/3/25: No significant hemodynamic stenosis of either carotid artery.  - CT Heart congenital: 2/3/25  Severe stenosis of distal RCA. There is a left atrial appendage clot. Trileaflet calcified aortic valve.  - CTA chest: Thoracic aortic aneurysm, measuring up to 4.4 cm in the aortic root. Heavily calcified aortic valve, a finding often associated with AS, Moderate-sized right pleural effusion. A tubular opacity in the right upper lobe could either be a lung nodule, mucus plugging, or atelectasis.  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

## 2025-02-12 NOTE — H&P ADULT - ASSESSMENT
89 year old male with family history of CAD and PMH of HTN, HLD, Atrial Fibrillation (currently on Eliquis, last dose 2/12/25  PM), PPM ([facility] in 2018 followed by Dr. Galvez, last checked a few months ago), Thoracic aortic aneurysm, history of Prostate CA, CKD (creatine 1.21), chronic diastolic heart failure with severe AS (PV 4.11 MG 43) with c/o AVILA and fatigue being evaluated for TAVR.    In light of pts risk factors, CCS classIII anginal symptoms, known severe AS  and abnormal CT angio, pt now presents to [facility] for recommended cardiac catheterization. Pt will also get a JORDANA @ 8am to assess THOR clot prior to [facility].       		  ASA  III  Mallampati class: III ____   Anginal Class: _III_    -no load ( severe AS, pre-TAVR)  known severe AS- . Euvolemic on exam. IV , pre-cath at KVO/50 cc/h only given severe AS  Sedation Plan:   Moderate  Patient Is Suitable Candidate For Sedation?     Yes    Risks & benefits of procedure and alternative therapy have been explained to the patient by the Interventional Cardiology Fellow including but not limited to: allergic reaction, bleeding with possible need for blood transfusion, infection, renal and vascular compromise, limb damage, arrhythmia, stroke, vessel dissection/perforation, myocardial infarction, and emergent CABG. Informed consent obtained at bedside and included in chart.   89 year old male with family history of CAD and PMH of HTN, HLD, Atrial Fibrillation (currently on Eliquis, last dose 2/12/25  PM, took lovenox SQ 2/13 for THOR clot seen on congenital CT heart), PPM ([facility] in [place] followed by Dr. Galvez, last checked a few months ago), Thoracic aortic aneurysm, history of Prostate CA, CKD (creatine 1.21), chronic diastolic heart failure with severe AS (PV 4.11 MG 43) with c/o AVILA and fatigue being evaluated for TAVR.    In light of pts risk factors, CCS classIII anginal symptoms, known severe AS  and abnormal CT angio, pt now presents to [facility] for recommended cardiac catheterization. Pt will also get a JORDANA @ 8am to assess THOR clot prior to C.     TTE before cardiac cath to assess  THOR clot seen on CT heart  2/3/25  		  ASA  III  Mallampati class: III ____   Anginal Class: _III_    -no load ( severe AS, pre-TAVR)  known severe AS- . Euvolemic on exam. IV , pre-cath at KVO/50 cc/h only given severe AS  Sedation Plan:   Moderate  Patient Is Suitable Candidate For Sedation?     Yes    Risks & benefits of procedure and alternative therapy have been explained to the patient by the Interventional Cardiology Fellow including but not limited to: allergic reaction, bleeding with possible need for blood transfusion, infection, renal and vascular compromise, limb damage, arrhythmia, stroke, vessel dissection/perforation, myocardial infarction, and emergent CABG. Informed consent obtained at bedside and included in chart.   89 year old male with family history of CAD and PMH of HTN, HLD, Atrial Fibrillation (currently on Eliquis, last dose 2/12/25  PM, took lovenox SQ 2/13 for THOR clot seen on congenital CT heart), PPM ([facility] in 2018 followed by Dr. Galvez, last checked a few months ago), Thoracic aortic aneurysm, history of Prostate CA, CKD (creatine 1.21), chronic diastolic heart failure with severe AS (PV 4.11 MG 43) with c/o AVILA and fatigue being evaluated for TAVR.    In light of pts risk factors, CCS classIII anginal symptoms, known severe AS  and abnormal CT angio, pt now presents to [facility] for recommended cardiac catheterization. Pt will also get a JORDANA @ 8am to assess THOR clot prior to [facility].     TTE before cardiac cath to assess  THOR clot seen on CT heart  2/3/25  		  ASA  III  Mallampati class: III ____   Anginal Class: _III_    -no load ( severe AS, pre-TAVR)  known severe AS- . Euvolemic on exam. IV , pre-cath at KVO/50 cc/h only given severe AS  Sedation Plan:   Moderate  Patient Is Suitable Candidate For Sedation?     Yes    Risks & benefits of procedure and alternative therapy have been explained to the patient by the Interventional Cardiology Fellow including but not limited to: allergic reaction, bleeding with possible need for blood transfusion, infection, renal and vascular compromise, limb damage, arrhythmia, stroke, vessel dissection/perforation, myocardial infarction, and emergent CABG. Informed consent obtained at bedside and included in chart.        PA ADDENDUM 11:30AAM  s/p JORDANA this am 2/14/25  confirming  thrombus visualized in the left atrial appendage.  cath cancelled by Dr Walker, CTS to see patient before gong home today     1. Normal left ventricular systolic function.   2. Normal right ventricular systolic function.   3. Layered thrombus visualized in the left atrial appendage.   4. Severe biatrial dilation.   5. No evidence of an intracardiac shunt.   6. Severe aortic stenosis.   7. Mild-to-moderate aortic regurgitation.   8. Mild mitral regurgitation.   9. Mild tricuspid regurgitation.  10. A device lead is noted in the right heart.  11. Trivial pericardial effusion.  12. Aortic root is dilated measuring 4.40 cm. Proximal ascending aorta is   dilated measuring 4.35 cm.  13. Severe plaque seen in the visualized portion of the descending aorta.

## 2025-02-14 ENCOUNTER — RESULT REVIEW (OUTPATIENT)
Age: 89
End: 2025-02-14

## 2025-02-14 ENCOUNTER — OUTPATIENT (OUTPATIENT)
Dept: OUTPATIENT SERVICES | Facility: HOSPITAL | Age: 89
LOS: 1 days | End: 2025-02-14
Payer: MEDICARE

## 2025-02-14 DIAGNOSIS — I25.10 ATHEROSCLEROTIC HEART DISEASE OF NATIVE CORONARY ARTERY WITHOUT ANGINA PECTORIS: ICD-10-CM

## 2025-02-14 LAB
A1C WITH ESTIMATED AVERAGE GLUCOSE RESULT: 5.9 % — HIGH (ref 4–5.6)
ALBUMIN SERPL ELPH-MCNC: 4.5 G/DL — SIGNIFICANT CHANGE UP (ref 3.3–5)
ALP SERPL-CCNC: 151 U/L — HIGH (ref 40–120)
ALT FLD-CCNC: 29 U/L — SIGNIFICANT CHANGE UP (ref 10–45)
ANION GAP SERPL CALC-SCNC: 12 MMOL/L — SIGNIFICANT CHANGE UP (ref 5–17)
APTT BLD: 36.3 SEC — HIGH (ref 24.5–35.6)
AST SERPL-CCNC: 36 U/L — SIGNIFICANT CHANGE UP (ref 10–40)
BASOPHILS # BLD AUTO: 0.05 K/UL — SIGNIFICANT CHANGE UP (ref 0–0.2)
BASOPHILS NFR BLD AUTO: 1 % — SIGNIFICANT CHANGE UP (ref 0–2)
BILIRUB SERPL-MCNC: 0.8 MG/DL — SIGNIFICANT CHANGE UP (ref 0.2–1.2)
BUN SERPL-MCNC: 27 MG/DL — HIGH (ref 7–23)
CALCIUM SERPL-MCNC: 9.6 MG/DL — SIGNIFICANT CHANGE UP (ref 8.4–10.5)
CHLORIDE SERPL-SCNC: 98 MMOL/L — SIGNIFICANT CHANGE UP (ref 96–108)
CHOLEST SERPL-MCNC: 166 MG/DL — SIGNIFICANT CHANGE UP
CK MB CFR SERPL CALC: 5.9 NG/ML — SIGNIFICANT CHANGE UP (ref 0–6.7)
CK SERPL-CCNC: 253 U/L — HIGH (ref 30–200)
CO2 SERPL-SCNC: 26 MMOL/L — SIGNIFICANT CHANGE UP (ref 22–31)
CREAT SERPL-MCNC: 1.27 MG/DL — SIGNIFICANT CHANGE UP (ref 0.5–1.3)
EGFR: 54 ML/MIN/1.73M2 — LOW
EOSINOPHIL # BLD AUTO: 0.11 K/UL — SIGNIFICANT CHANGE UP (ref 0–0.5)
EOSINOPHIL NFR BLD AUTO: 2.2 % — SIGNIFICANT CHANGE UP (ref 0–6)
ESTIMATED AVERAGE GLUCOSE: 123 MG/DL — HIGH (ref 68–114)
GLUCOSE SERPL-MCNC: 102 MG/DL — HIGH (ref 70–99)
HCT VFR BLD CALC: 40.6 % — SIGNIFICANT CHANGE UP (ref 39–50)
HDLC SERPL-MCNC: 75 MG/DL — SIGNIFICANT CHANGE UP
HGB BLD-MCNC: 12.7 G/DL — LOW (ref 13–17)
IMM GRANULOCYTES NFR BLD AUTO: 0.2 % — SIGNIFICANT CHANGE UP (ref 0–0.9)
INR BLD: 1.15 — SIGNIFICANT CHANGE UP (ref 0.85–1.16)
LIPID PNL WITH DIRECT LDL SERPL: 76 MG/DL — SIGNIFICANT CHANGE UP
LYMPHOCYTES # BLD AUTO: 1.43 K/UL — SIGNIFICANT CHANGE UP (ref 1–3.3)
LYMPHOCYTES # BLD AUTO: 28.5 % — SIGNIFICANT CHANGE UP (ref 13–44)
MCHC RBC-ENTMCNC: 29.8 PG — SIGNIFICANT CHANGE UP (ref 27–34)
MCHC RBC-ENTMCNC: 31.3 G/DL — LOW (ref 32–36)
MCV RBC AUTO: 95.3 FL — SIGNIFICANT CHANGE UP (ref 80–100)
MONOCYTES # BLD AUTO: 0.49 K/UL — SIGNIFICANT CHANGE UP (ref 0–0.9)
MONOCYTES NFR BLD AUTO: 9.8 % — SIGNIFICANT CHANGE UP (ref 2–14)
NEUTROPHILS # BLD AUTO: 2.92 K/UL — SIGNIFICANT CHANGE UP (ref 1.8–7.4)
NEUTROPHILS NFR BLD AUTO: 58.3 % — SIGNIFICANT CHANGE UP (ref 43–77)
NON HDL CHOLESTEROL: 91 MG/DL — SIGNIFICANT CHANGE UP
NRBC BLD AUTO-RTO: 0 /100 WBCS — SIGNIFICANT CHANGE UP (ref 0–0)
PLATELET # BLD AUTO: 173 K/UL — SIGNIFICANT CHANGE UP (ref 150–400)
POTASSIUM SERPL-MCNC: 4 MMOL/L — SIGNIFICANT CHANGE UP (ref 3.5–5.3)
POTASSIUM SERPL-SCNC: 4 MMOL/L — SIGNIFICANT CHANGE UP (ref 3.5–5.3)
PROT SERPL-MCNC: 9.4 G/DL — HIGH (ref 6–8.3)
PROTHROM AB SERPL-ACNC: 13.2 SEC — SIGNIFICANT CHANGE UP (ref 9.9–13.4)
RBC # BLD: 4.26 M/UL — SIGNIFICANT CHANGE UP (ref 4.2–5.8)
RBC # FLD: 13.9 % — SIGNIFICANT CHANGE UP (ref 10.3–14.5)
SODIUM SERPL-SCNC: 136 MMOL/L — SIGNIFICANT CHANGE UP (ref 135–145)
TRIGL SERPL-MCNC: 80 MG/DL — SIGNIFICANT CHANGE UP
WBC # BLD: 5.01 K/UL — SIGNIFICANT CHANGE UP (ref 3.8–10.5)
WBC # FLD AUTO: 5.01 K/UL — SIGNIFICANT CHANGE UP (ref 3.8–10.5)

## 2025-02-14 PROCEDURE — 85610 PROTHROMBIN TIME: CPT

## 2025-02-14 PROCEDURE — 83735 ASSAY OF MAGNESIUM: CPT

## 2025-02-14 PROCEDURE — 93312 ECHO TRANSESOPHAGEAL: CPT

## 2025-02-14 PROCEDURE — 76377 3D RENDER W/INTRP POSTPROCES: CPT

## 2025-02-14 PROCEDURE — 85025 COMPLETE CBC W/AUTO DIFF WBC: CPT

## 2025-02-14 PROCEDURE — 93325 DOPPLER ECHO COLOR FLOW MAPG: CPT

## 2025-02-14 PROCEDURE — 82803 BLOOD GASES ANY COMBINATION: CPT

## 2025-02-14 PROCEDURE — 80053 COMPREHEN METABOLIC PANEL: CPT

## 2025-02-14 PROCEDURE — 85730 THROMBOPLASTIN TIME PARTIAL: CPT

## 2025-02-14 PROCEDURE — 82550 ASSAY OF CK (CPK): CPT

## 2025-02-14 PROCEDURE — 83036 HEMOGLOBIN GLYCOSYLATED A1C: CPT

## 2025-02-14 PROCEDURE — C8925: CPT

## 2025-02-14 PROCEDURE — 80061 LIPID PANEL: CPT

## 2025-02-14 PROCEDURE — 76377 3D RENDER W/INTRP POSTPROCES: CPT | Mod: 26

## 2025-02-14 PROCEDURE — 82553 CREATINE MB FRACTION: CPT

## 2025-02-14 PROCEDURE — 93320 DOPPLER ECHO COMPLETE: CPT | Mod: 26

## 2025-02-14 PROCEDURE — 93320 DOPPLER ECHO COMPLETE: CPT

## 2025-02-14 PROCEDURE — 36415 COLL VENOUS BLD VENIPUNCTURE: CPT

## 2025-02-14 PROCEDURE — 93312 ECHO TRANSESOPHAGEAL: CPT | Mod: 26

## 2025-02-14 RX ORDER — ANTISEPTIC SURGICAL SCRUB 0.04 MG/ML
1 SOLUTION TOPICAL ONCE
Refills: 0 | Status: ACTIVE | OUTPATIENT
Start: 2025-02-14

## 2025-02-14 RX ORDER — BACTERIOSTATIC SODIUM CHLORIDE 0.9 %
1000 VIAL (ML) INJECTION
Refills: 0 | Status: ACTIVE | OUTPATIENT
Start: 2025-02-14 | End: 2025-02-14

## 2025-02-14 RX ADMIN — Medication 50 MILLILITER(S): at 08:07

## 2025-02-14 NOTE — CHART NOTE - NSCHARTNOTEFT_GEN_A_CORE
cardiac cath cancelled today by Dr Walker -  s/p JORDANA this am 2/14/25  confirming  thrombus visualized in the left atrial appendage.  patient was seen by  CTS  before gong home today with plans to have tele visit follow up appt with Dr Garrison on 2/18 Tue at 11:30 AM  CONTINUE ALL MEDS, CONTINUE ELIQUIS     JORDANA 2/14/2025   1. Normal left ventricular systolic function.   2. Normal right ventricular systolic function.   3. Layered thrombus visualized in the left atrial appendage.   4. Severe biatrial dilation.   5. No evidence of an intracardiac shunt.   6. Severe aortic stenosis.   7. Mild-to-moderate aortic regurgitation.   8. Mild mitral regurgitation.   9. Mild tricuspid regurgitation.  10. A device lead is noted in the right heart.  11. Trivial pericardial effusion.  12. Aortic root is dilated measuring 4.40 cm. Proximal ascending aorta is   dilated measuring 4.35 cm.  13. Severe plaque seen in the visualized portion of the descending aorta.

## 2025-02-18 ENCOUNTER — APPOINTMENT (OUTPATIENT)
Dept: CARDIOTHORACIC SURGERY | Facility: CLINIC | Age: 89
End: 2025-02-18
Payer: MEDICARE

## 2025-02-18 DIAGNOSIS — I51.3 INTRACARDIAC THROMBOSIS, NOT ELSEWHERE CLASSIFIED: ICD-10-CM

## 2025-02-18 PROCEDURE — 99214 OFFICE O/P EST MOD 30 MIN: CPT | Mod: 2W

## 2025-02-18 RX ORDER — DABIGATRAN ETEXILATE 150 MG/1
150 CAPSULE ORAL TWICE DAILY
Qty: 60 | Refills: 1 | Status: ACTIVE | COMMUNITY
Start: 2025-02-18 | End: 1900-01-01

## 2025-02-18 RX ORDER — FUROSEMIDE 20 MG/1
20 TABLET ORAL EVERY OTHER DAY
Refills: 0 | Status: ACTIVE | COMMUNITY

## 2025-02-20 ENCOUNTER — RX RENEWAL (OUTPATIENT)
Age: 89
End: 2025-02-20

## 2025-02-25 PROBLEM — I35.0 NONRHEUMATIC AORTIC (VALVE) STENOSIS: Chronic | Status: ACTIVE | Noted: 2025-02-12

## 2025-02-25 PROBLEM — I73.9 PERIPHERAL VASCULAR DISEASE, UNSPECIFIED: Chronic | Status: ACTIVE | Noted: 2025-02-12

## 2025-02-25 PROBLEM — I48.91 UNSPECIFIED ATRIAL FIBRILLATION: Chronic | Status: ACTIVE | Noted: 2025-02-12

## 2025-02-25 PROBLEM — E78.5 HYPERLIPIDEMIA, UNSPECIFIED: Chronic | Status: ACTIVE | Noted: 2025-02-12

## 2025-02-25 PROBLEM — R09.89 OTHER SPECIFIED SYMPTOMS AND SIGNS INVOLVING THE CIRCULATORY AND RESPIRATORY SYSTEMS: Chronic | Status: ACTIVE | Noted: 2025-02-12

## 2025-02-25 PROBLEM — I10 ESSENTIAL (PRIMARY) HYPERTENSION: Chronic | Status: ACTIVE | Noted: 2025-02-12

## 2025-04-01 ENCOUNTER — OUTPATIENT (OUTPATIENT)
Dept: OUTPATIENT SERVICES | Facility: HOSPITAL | Age: 89
LOS: 1 days | End: 2025-04-01
Payer: MEDICARE

## 2025-04-01 ENCOUNTER — RESULT REVIEW (OUTPATIENT)
Age: 89
End: 2025-04-01

## 2025-04-01 ENCOUNTER — APPOINTMENT (OUTPATIENT)
Dept: CT IMAGING | Facility: HOSPITAL | Age: 89
End: 2025-04-01

## 2025-04-01 PROCEDURE — 71275 CT ANGIOGRAPHY CHEST: CPT | Mod: MH

## 2025-04-01 PROCEDURE — 71275 CT ANGIOGRAPHY CHEST: CPT | Mod: 26

## 2025-04-01 PROCEDURE — 75574 CT ANGIO HRT W/3D IMAGE: CPT

## 2025-04-01 PROCEDURE — 75574 CT ANGIO HRT W/3D IMAGE: CPT | Mod: 26

## 2025-04-01 PROCEDURE — 82565 ASSAY OF CREATININE: CPT

## 2025-04-01 PROCEDURE — 74174 CTA ABD&PLVS W/CONTRAST: CPT | Mod: MH

## 2025-04-01 PROCEDURE — 74174 CTA ABD&PLVS W/CONTRAST: CPT | Mod: 26

## 2025-04-07 ENCOUNTER — APPOINTMENT (OUTPATIENT)
Dept: CARDIOTHORACIC SURGERY | Facility: CLINIC | Age: 89
End: 2025-04-07
Payer: MEDICARE

## 2025-04-07 VITALS
HEART RATE: 69 BPM | OXYGEN SATURATION: 97 % | WEIGHT: 161 LBS | SYSTOLIC BLOOD PRESSURE: 116 MMHG | TEMPERATURE: 96.8 F | HEIGHT: 73 IN | DIASTOLIC BLOOD PRESSURE: 61 MMHG | BODY MASS INDEX: 21.34 KG/M2

## 2025-04-07 DIAGNOSIS — I35.0 NONRHEUMATIC AORTIC (VALVE) STENOSIS: ICD-10-CM

## 2025-04-07 DIAGNOSIS — I48.91 UNSPECIFIED ATRIAL FIBRILLATION: ICD-10-CM

## 2025-04-07 PROCEDURE — 99215 OFFICE O/P EST HI 40 MIN: CPT

## 2025-04-08 ENCOUNTER — APPOINTMENT (OUTPATIENT)
Dept: INTERNAL MEDICINE | Facility: CLINIC | Age: 89
End: 2025-04-08

## 2025-04-09 ENCOUNTER — APPOINTMENT (OUTPATIENT)
Dept: HEART AND VASCULAR | Facility: CLINIC | Age: 89
End: 2025-04-09

## 2025-04-11 ENCOUNTER — INPATIENT (INPATIENT)
Facility: HOSPITAL | Age: 89
LOS: 2 days | Discharge: HOME | DRG: 291 | End: 2025-04-14
Attending: INTERNAL MEDICINE | Admitting: INTERNAL MEDICINE
Payer: MEDICARE

## 2025-04-11 ENCOUNTER — NON-APPOINTMENT (OUTPATIENT)
Age: 89
End: 2025-04-11

## 2025-04-11 VITALS
RESPIRATION RATE: 18 BRPM | OXYGEN SATURATION: 96 % | HEART RATE: 64 BPM | DIASTOLIC BLOOD PRESSURE: 82 MMHG | SYSTOLIC BLOOD PRESSURE: 135 MMHG | TEMPERATURE: 97 F

## 2025-04-11 LAB
ANION GAP SERPL CALC-SCNC: 9 MMOL/L — SIGNIFICANT CHANGE UP (ref 5–17)
APTT BLD: 56.3 SEC — HIGH (ref 24.5–35.6)
APTT BLD: 62.3 SEC — HIGH (ref 24.5–35.6)
BASOPHILS # BLD AUTO: 0.02 K/UL — SIGNIFICANT CHANGE UP (ref 0–0.2)
BASOPHILS NFR BLD AUTO: 0.4 % — SIGNIFICANT CHANGE UP (ref 0–2)
BUN SERPL-MCNC: 28 MG/DL — HIGH (ref 7–23)
CALCIUM SERPL-MCNC: 9.4 MG/DL — SIGNIFICANT CHANGE UP (ref 8.4–10.5)
CHLORIDE SERPL-SCNC: 107 MMOL/L — SIGNIFICANT CHANGE UP (ref 96–108)
CO2 SERPL-SCNC: 23 MMOL/L — SIGNIFICANT CHANGE UP (ref 22–31)
CREAT SERPL-MCNC: 1.3 MG/DL — SIGNIFICANT CHANGE UP (ref 0.5–1.3)
EGFR: 53 ML/MIN/1.73M2 — LOW
EGFR: 53 ML/MIN/1.73M2 — LOW
EOSINOPHIL # BLD AUTO: 0.06 K/UL — SIGNIFICANT CHANGE UP (ref 0–0.5)
EOSINOPHIL NFR BLD AUTO: 1.2 % — SIGNIFICANT CHANGE UP (ref 0–6)
FLUAV AG NPH QL: SIGNIFICANT CHANGE UP
FLUBV AG NPH QL: SIGNIFICANT CHANGE UP
GAS PNL BLDV: SIGNIFICANT CHANGE UP
GLUCOSE SERPL-MCNC: 138 MG/DL — HIGH (ref 70–99)
HCT VFR BLD CALC: 35.3 % — LOW (ref 39–50)
HGB BLD-MCNC: 11.5 G/DL — LOW (ref 13–17)
IMM GRANULOCYTES NFR BLD AUTO: 0.4 % — SIGNIFICANT CHANGE UP (ref 0–0.9)
INR BLD: 1.37 — HIGH (ref 0.85–1.16)
INR BLD: 1.41 — HIGH (ref 0.85–1.16)
LYMPHOCYTES # BLD AUTO: 0.85 K/UL — LOW (ref 1–3.3)
LYMPHOCYTES # BLD AUTO: 16.4 % — SIGNIFICANT CHANGE UP (ref 13–44)
MCHC RBC-ENTMCNC: 31.7 PG — SIGNIFICANT CHANGE UP (ref 27–34)
MCHC RBC-ENTMCNC: 32.6 G/DL — SIGNIFICANT CHANGE UP (ref 32–36)
MCV RBC AUTO: 97.2 FL — SIGNIFICANT CHANGE UP (ref 80–100)
MONOCYTES # BLD AUTO: 0.45 K/UL — SIGNIFICANT CHANGE UP (ref 0–0.9)
MONOCYTES NFR BLD AUTO: 8.7 % — SIGNIFICANT CHANGE UP (ref 2–14)
NEUTROPHILS # BLD AUTO: 3.78 K/UL — SIGNIFICANT CHANGE UP (ref 1.8–7.4)
NEUTROPHILS NFR BLD AUTO: 72.9 % — SIGNIFICANT CHANGE UP (ref 43–77)
NRBC BLD AUTO-RTO: 0 /100 WBCS — SIGNIFICANT CHANGE UP (ref 0–0)
NT-PROBNP SERPL-SCNC: 3727 PG/ML — HIGH (ref 0–300)
PLATELET # BLD AUTO: 144 K/UL — LOW (ref 150–400)
POTASSIUM SERPL-MCNC: 4.4 MMOL/L — SIGNIFICANT CHANGE UP (ref 3.5–5.3)
POTASSIUM SERPL-SCNC: 4.4 MMOL/L — SIGNIFICANT CHANGE UP (ref 3.5–5.3)
PROTHROM AB SERPL-ACNC: 16 SEC — HIGH (ref 9.9–13.4)
PROTHROM AB SERPL-ACNC: 16.4 SEC — HIGH (ref 9.9–13.4)
RBC # BLD: 3.63 M/UL — LOW (ref 4.2–5.8)
RBC # FLD: 14.2 % — SIGNIFICANT CHANGE UP (ref 10.3–14.5)
RSV RNA NPH QL NAA+NON-PROBE: SIGNIFICANT CHANGE UP
SARS-COV-2 RNA SPEC QL NAA+PROBE: SIGNIFICANT CHANGE UP
SODIUM SERPL-SCNC: 139 MMOL/L — SIGNIFICANT CHANGE UP (ref 135–145)
SOURCE RESPIRATORY: SIGNIFICANT CHANGE UP
TROPONIN T, HIGH SENSITIVITY RESULT: 28 NG/L — SIGNIFICANT CHANGE UP (ref 0–51)
WBC # BLD: 5.18 K/UL — SIGNIFICANT CHANGE UP (ref 3.8–10.5)
WBC # FLD AUTO: 5.18 K/UL — SIGNIFICANT CHANGE UP (ref 3.8–10.5)

## 2025-04-11 PROCEDURE — 99285 EMERGENCY DEPT VISIT HI MDM: CPT

## 2025-04-11 PROCEDURE — 71045 X-RAY EXAM CHEST 1 VIEW: CPT | Mod: 26

## 2025-04-11 PROCEDURE — 99223 1ST HOSP IP/OBS HIGH 75: CPT

## 2025-04-11 RX ORDER — ATORVASTATIN CALCIUM 80 MG/1
40 TABLET, FILM COATED ORAL AT BEDTIME
Refills: 0 | Status: DISCONTINUED | OUTPATIENT
Start: 2025-04-11 | End: 2025-04-14

## 2025-04-11 RX ORDER — METOPROLOL SUCCINATE 50 MG/1
50 TABLET, EXTENDED RELEASE ORAL DAILY
Refills: 0 | Status: DISCONTINUED | OUTPATIENT
Start: 2025-04-12 | End: 2025-04-14

## 2025-04-11 RX ORDER — ACETAMINOPHEN 500 MG/5ML
650 LIQUID (ML) ORAL EVERY 6 HOURS
Refills: 0 | Status: DISCONTINUED | OUTPATIENT
Start: 2025-04-11 | End: 2025-04-14

## 2025-04-11 RX ORDER — FUROSEMIDE 10 MG/ML
20 INJECTION INTRAMUSCULAR; INTRAVENOUS ONCE
Refills: 0 | Status: COMPLETED | OUTPATIENT
Start: 2025-04-11 | End: 2025-04-11

## 2025-04-11 RX ORDER — LOSARTAN POTASSIUM 100 MG/1
25 TABLET, FILM COATED ORAL DAILY
Refills: 0 | Status: DISCONTINUED | OUTPATIENT
Start: 2025-04-12 | End: 2025-04-14

## 2025-04-11 RX ORDER — FUROSEMIDE 10 MG/ML
20 INJECTION INTRAMUSCULAR; INTRAVENOUS ONCE
Refills: 0 | Status: COMPLETED | OUTPATIENT
Start: 2025-04-12 | End: 2025-04-12

## 2025-04-11 RX ORDER — FERROUS SULFATE 137(45) MG
325 TABLET, EXTENDED RELEASE ORAL DAILY
Refills: 0 | Status: DISCONTINUED | OUTPATIENT
Start: 2025-04-11 | End: 2025-04-14

## 2025-04-11 RX ADMIN — Medication 325 MILLIGRAM(S): at 17:27

## 2025-04-11 RX ADMIN — Medication 3 MILLILITER(S): at 22:04

## 2025-04-11 RX ADMIN — ATORVASTATIN CALCIUM 40 MILLIGRAM(S): 80 TABLET, FILM COATED ORAL at 21:52

## 2025-04-11 RX ADMIN — FUROSEMIDE 20 MILLIGRAM(S): 10 INJECTION INTRAMUSCULAR; INTRAVENOUS at 15:05

## 2025-04-11 NOTE — ED ADULT NURSE NOTE - NSFALLRISKINTERV_ED_ALL_ED

## 2025-04-11 NOTE — ED ADULT NURSE NOTE - OBJECTIVE STATEMENT
Next available or sooner if opening Male aox4 c/o sob starting last night which began at rest. Denies chest pain. Pt noted with some difficulty getting out full sentences. Hx PE on anticoagulants (does not recall which), hf,  htn. No swelling noted to b/l lower extremities. Pt changed into hospital gown. Male aox4 c/o sob starting last night which began at rest. Denies chest pain. Pt noted with some difficulty getting out full sentences. Hx PE on anticoagulants (pradaxa), hf,  htn. No swelling noted to b/l lower extremities. Pt changed into hospital gown.

## 2025-04-11 NOTE — ED PROVIDER NOTE - INTERNATIONAL TRAVEL
Quality 226: Preventive Care And Screening: Tobacco Use: Screening And Cessation Intervention: Patient screened for tobacco and is an ex-smoker Quality 431: Preventive Care And Screening: Unhealthy Alcohol Use - Screening: Patient screened for unhealthy alcohol use using a single question and scores less than 2 times per year Quality 130: Documentation Of Current Medications In The Medical Record: Current Medications Documented Detail Level: Detailed No

## 2025-04-11 NOTE — H&P ADULT - NSHPSOCIALHISTORY_GEN_ALL_CORE
Social History  Smoker:   No  ETOH Use:   No  Ilicit Drug Use:   No  Occupation: retired   Assistant Devices:   None  Lives with: Wife

## 2025-04-11 NOTE — H&P ADULT - ASSESSMENT
A/P:    89 year old male with family history of CAD and PMHx of HTN, HLD, Atrial Fibrillation (on pradaxa), PPM ([facility] in 2018 followed by Dr. Galvez, last checked a few months ago), thoracic aortic aneurysm, history of Prostate CA, CKD (creatine 1.21), chronic diastolic heart failure with severe AS (PV 4.11 MG 43) who recently saw his cardiologist, Dr. Minaya complaining of worsening dyspnea for the last few months. In February 2025 he presented for possible left heart catheterization which was cancelled after JORDANA that day revealed thrombus in the left atrial appendage and his eliquis was switched to pradaxa. He was followed as an outpatient by Dr. Garrison for his valvular disease. On 4/11 he presented to [facility] after noticing worsening SOB and orthopnea. On arrival to ED patient saturating well on room air. CXR revealed possible moderate right pleural effusion. BNP >3000 and patient admitted to CTS service under Dr. Garrison for heart failure exacerbation.     Neurovascular:   - No delirium. Pain well controlled with current regimen.  -Continue tylenol PRN    Cardiovascular:   - Severe AS: has been worked up for TAVR as outpatient (CT scans completed), plan for TAVR in the future (this admission next week vs. discharge and return as outpatient)  - Acute on chronic CHF exacerbation: continue diuresis with lasix (given 20 mg IV today, additional dose written for tomorrow)  -Hemodynamically stable. HR controlled, hx of AF currently on pradaxa, to start hep gtt once ptt normalizes  - S/p PPM ([facility] 2018)  - Hx of HTN, BP controlled, continue home losartan (irbesartain at home), and metoprolol 50 mg daily   - Hx HLD: continue atorvastatin 40 mg daily     Respiratory:   - 02 Sat = 98% on RA.  -If on oxygen wean to RA from for O2 Sat > 93%.  -Encourage C+DB and Use of IS 10x / hr while awake.  -CXR with moderate right effusion, to be ultrasounded and assessed for drainage once PTT normalizes     GI:   -Continue GI PPX with protonix  -PO Diet.    Renal / :  - BUN/Cr stable at  28/1.30, hx CKD with baseline Cr 1.2  -Continue to monitor renal function.  -Monitor I/O's.  - Replete electrolytes PRN    Endocrine:    - No known hx DM or thyroid disease     Hematologic:  -H/H stable at 11.5/35.3 with no obvious signs of bleeding  -Coagulation Panel.    ID:  -Pt remains afebrile with no elevation in WBC or signs of infection  -Continue to monitor CBC  -Observe for SIRS/Sepsis Syndrome.    Prophylaxis:  -DVT prophylaxis with hep gtt to start pending repeat coags   -SCD's    Disposition:  - Inpatient CHF exacerbation management then discharge vs. TAVR next week   A/P:    89 year old male with family history of CAD and PMHx of HTN, HLD, Atrial Fibrillation (on pradaxa), PPM ([facility] in 2018 followed by Dr. Galvez, last checked a few months ago), thoracic aortic aneurysm, history of Prostate CA, CKD (creatine 1.21), chronic diastolic heart failure with severe AS (PV 4.11 MG 43) who recently saw his cardiologist, Dr. Minaya complaining of worsening dyspnea for the last few months. In February 2025 he presented for possible left heart catheterization which was cancelled after JORDANA that day revealed thrombus in the left atrial appendage and his eliquis was switched to pradaxa. He was followed as an outpatient by Dr. Garrison for his valvular disease. On 4/11 he presented to [facility] after noticing worsening SOB and orthopnea. On arrival to ED patient saturating well on room air. CXR revealed possible moderate right pleural effusion. BNP >3000 and patient admitted to CTS service under Dr. Garrison for heart failure exacerbation.     Neurovascular:   - No delirium. Pain well controlled with current regimen.  -Continue tylenol PRN    Cardiovascular:   - Severe AS: has been worked up for TAVR as outpatient (CT scans completed), plan for TAVR in the future (this admission next week vs. discharge and return as outpatient)  - Acute on chronic CHF exacerbation: continue diuresis with lasix (given 20 mg IV today, additional dose written for tomorrow)  - THOR thrombus: per Dr. Walker most recent imaging with resolution, currently on pradaxa t home, to start hep gtt once ptt normalizes  -Hemodynamically stable. HR controlled, hx of AF currently on pradaxa, to start hep gtt once ptt normalizes  - S/p PPM ([facility] 2018)  - Hx of HTN, BP controlled, continue home losartan (irbesartain at home), and metoprolol 50 mg daily   - Hx HLD: continue atorvastatin 40 mg daily     Respiratory:   - 02 Sat = 98% on RA.  -If on oxygen wean to RA from for O2 Sat > 93%.  -Encourage C+DB and Use of IS 10x / hr while awake.  -CXR with moderate right effusion, to be ultrasounded and assessed for drainage once PTT normalizes     GI:   -Continue GI PPX with protonix  -PO Diet.    Renal / :  - BUN/Cr stable at  28/1.30, hx CKD with baseline Cr 1.2  -Continue to monitor renal function.  -Monitor I/O's.  - Replete electrolytes PRN    Endocrine:    - No known hx DM or thyroid disease     Hematologic:  -H/H stable at 11.5/35.3 with no obvious signs of bleeding  -Coagulation Panel.    ID:  -Pt remains afebrile with no elevation in WBC or signs of infection  -Continue to monitor CBC  -Observe for SIRS/Sepsis Syndrome.    Prophylaxis:  -DVT prophylaxis with hep gtt to start pending repeat coags   -SCD's    Disposition:  - Inpatient CHF exacerbation management then discharge vs. TAVR next week

## 2025-04-11 NOTE — PATIENT PROFILE ADULT - NSPROGENBLOODRESTRICT_GEN_A_NUR
Discussed condition and exacerbating conditions/situations (e.g., dry/arid environments, overhead fans, air conditioners, side effect of medications). none

## 2025-04-11 NOTE — ED ADULT NURSE NOTE - NSFALLFACTORS_ED_ALL_ED
Weakness

## 2025-04-11 NOTE — PATIENT PROFILE ADULT - FALL HARM RISK - HARM RISK INTERVENTIONS

## 2025-04-11 NOTE — H&P ADULT - NSHPPHYSICALEXAM_GEN_ALL_CORE
Physical Exam  CONSTITUTIONAL: well appearing, NAD  NEURO:  A&OX3, no focal deficits                   EYES: PERRLA  ENMT:  neck supple   CV:  RRR, +PAULINE, no rubs/ gallops   RESPIRATORY:  diminished lung sounds right base, saturating well on room air   GI: +BS, NT/ND  : No george   MUSKULOSKELETAL: no significant edema or calf tenderness   SKIN / BREAST: No rashes noted

## 2025-04-11 NOTE — ED ADULT TRIAGE NOTE - CHIEF COMPLAINT QUOTE
Pt arrived to ED c/o sob since last night. Pt hx of PE's, HTN, HLD, afib and HF.. Pt is not answering questions in triage stating too much is happening for him to answer. Pt reports he is supposed to have a procedure but is unable to tell triage what procedure. ekg in prog

## 2025-04-11 NOTE — H&P ADULT - HISTORY OF PRESENT ILLNESS
89 year old male with family history of CAD and PMHx of HTN, HLD, Atrial Fibrillation (on pradaxa), PPM ([facility] in [place] followed by Dr. Galvez, last checked a few months ago), thoracic aortic aneurysm, history of Prostate CA, CKD (creatine 1.21), chronic diastolic heart failure with severe AS (PV 4.11 MG 43) who recently saw his cardiologist, Dr. Minaya complaining of worsening dyspnea for the last few months. In February 2025 he presented for possible left heart catheterization which was cancelled after JORDANA that day revealed thrombus in the left atrial appendage and his eliquis was switched to pradaxa. He was followed as an outpatient by Dr. Garrison for his valvular disease. On 4/11 he presented to [facility] after noticing worsening SOB and orthopnea. On arrival to ED patient saturating well on room air. CXR revealed possible moderate right pleural effusion. BNP >3000 and patient admitted to CTS service under Dr. Garrison for heart failure exacerbation.

## 2025-04-11 NOTE — ED PROVIDER NOTE - OBJECTIVE STATEMENT
89 year old male with family history of CAD and PMH of HTN, HLD, Atrial Fibrillation (currently on Eliquis,) PPM ([facility] in 2018 followed by Dr. Galvez, last checked a few months ago), Thoracic aortic aneurysm, history of Prostate CA, CKD (creatine 1.21), chronic diastolic heart failure with severe AS (PV 4.11 MG 43), known LA appendage clot, followed by Dr. Garrison with plans for AVR, presenting with sob last night, now resolved. Pt endorsed sob last night, resolved now. Called Dr. Garrison's office, told to come to ER. No current symptoms. No cp or sob, no fever, chills, cough, nausea, vomiting, lightheadedness, dizziness. ROS as above.

## 2025-04-11 NOTE — PATIENT PROFILE ADULT - FUNCTIONAL ASSESSMENT - BASIC MOBILITY ASSESSMENT TYPE
Admission Xelanamikaz Pregnancy And Lactation Text: This medication is Pregnancy Category D and is not considered safe during pregnancy.  The risk during breast feeding is also uncertain.

## 2025-04-11 NOTE — H&P ADULT - NSHPREVIEWOFSYSTEMS_GEN_ALL_CORE
Review of Systems  CONSTITUTIONAL:  Denies fevers / chills, sweats, fatigue, weight loss, weight gain                                      NEURO:  Denies paresthesias, seizures, syncope, confusion                                                                                EYES:  Denies blurry vision, discharge, pain, loss of vision                                                                                    ENMT:  Denies difficulty hearing, vertigo, dysphagia, epistaxis, recent dental work                                       CV:  Denies chest pain, palpitations. Endorses AVILA, orthopnea                                                                                          RESPIRATORY:  Denies wheezing, SOB, cough / sputum, hemoptysis                                                                GI:  Denies nausea, vomiting, diarrhea, constipation, melena, difficulty swallowing                                             : Denies hematuria, dysuria, urgency, incontinence                                                                                         MUSCULOSKELETAL:  Denies arthritis, joint swelling, muscle weakness                                                             SKIN/BREAST:  Denies rash, itching, hair loss, masses                                                                                            PSYCH:  Denies depression, anxiety, suicidal ideation                                                                                               HEME/LYMPH:  Denies bruises easily, enlarged lymph nodes, tender lymph nodes                                        ENDOCRINE:  Denies cold intolerance, heat intolerance, polydipsia

## 2025-04-11 NOTE — H&P ADULT - NS ATTEND AMEND GEN_ALL_CORE FT
Agree with above with the following updates.    Pt seen and examined at bedside. Appears euvolemic. Reports that episode of breathlessness occurred briefly while he was laying down. Improved when he sat up. Has no occurred again since--able to lay flat now without difficulty. Known Rt pleural effusion reconfirmed on xray today. Will admit pt for IV diuresis and potential thoracentesis. TAVR and LAAO planning under way. Hold Pradaxa for now. Last CT showed resolution of THOR thrombus.

## 2025-04-12 LAB
ANION GAP SERPL CALC-SCNC: 10 MMOL/L — SIGNIFICANT CHANGE UP (ref 5–17)
APTT BLD: 46.3 SEC — HIGH (ref 24.5–35.6)
APTT BLD: 52.3 SEC — HIGH (ref 24.5–35.6)
BUN SERPL-MCNC: 28 MG/DL — HIGH (ref 7–23)
CALCIUM SERPL-MCNC: 9.3 MG/DL — SIGNIFICANT CHANGE UP (ref 8.4–10.5)
CHLORIDE SERPL-SCNC: 106 MMOL/L — SIGNIFICANT CHANGE UP (ref 96–108)
CO2 SERPL-SCNC: 24 MMOL/L — SIGNIFICANT CHANGE UP (ref 22–31)
CREAT SERPL-MCNC: 1.45 MG/DL — HIGH (ref 0.5–1.3)
EGFR: 46 ML/MIN/1.73M2 — LOW
EGFR: 46 ML/MIN/1.73M2 — LOW
GLUCOSE SERPL-MCNC: 103 MG/DL — HIGH (ref 70–99)
HCT VFR BLD CALC: 35.1 % — LOW (ref 39–50)
HGB BLD-MCNC: 11.3 G/DL — LOW (ref 13–17)
INR BLD: 1.29 — HIGH (ref 0.85–1.16)
INR BLD: 1.37 — HIGH (ref 0.85–1.16)
MAGNESIUM SERPL-MCNC: 1.8 MG/DL — SIGNIFICANT CHANGE UP (ref 1.6–2.6)
MCHC RBC-ENTMCNC: 30.7 PG — SIGNIFICANT CHANGE UP (ref 27–34)
MCHC RBC-ENTMCNC: 32.2 G/DL — SIGNIFICANT CHANGE UP (ref 32–36)
MCV RBC AUTO: 95.4 FL — SIGNIFICANT CHANGE UP (ref 80–100)
NRBC BLD AUTO-RTO: 0 /100 WBCS — SIGNIFICANT CHANGE UP (ref 0–0)
PLATELET # BLD AUTO: 147 K/UL — LOW (ref 150–400)
POTASSIUM SERPL-MCNC: 4.4 MMOL/L — SIGNIFICANT CHANGE UP (ref 3.5–5.3)
POTASSIUM SERPL-SCNC: 4.4 MMOL/L — SIGNIFICANT CHANGE UP (ref 3.5–5.3)
PROTHROM AB SERPL-ACNC: 14.8 SEC — HIGH (ref 9.9–13.4)
PROTHROM AB SERPL-ACNC: 16 SEC — HIGH (ref 9.9–13.4)
RBC # BLD: 3.68 M/UL — LOW (ref 4.2–5.8)
RBC # FLD: 14.4 % — SIGNIFICANT CHANGE UP (ref 10.3–14.5)
SODIUM SERPL-SCNC: 140 MMOL/L — SIGNIFICANT CHANGE UP (ref 135–145)
WBC # BLD: 6 K/UL — SIGNIFICANT CHANGE UP (ref 3.8–10.5)
WBC # FLD AUTO: 6 K/UL — SIGNIFICANT CHANGE UP (ref 3.8–10.5)

## 2025-04-12 PROCEDURE — 99233 SBSQ HOSP IP/OBS HIGH 50: CPT

## 2025-04-12 PROCEDURE — 71045 X-RAY EXAM CHEST 1 VIEW: CPT | Mod: 26

## 2025-04-12 RX ORDER — MAGNESIUM OXIDE 400 MG
400 TABLET ORAL ONCE
Refills: 0 | Status: COMPLETED | OUTPATIENT
Start: 2025-04-12 | End: 2025-04-12

## 2025-04-12 RX ADMIN — Medication 325 MILLIGRAM(S): at 10:56

## 2025-04-12 RX ADMIN — Medication 3 MILLILITER(S): at 14:23

## 2025-04-12 RX ADMIN — Medication 3 MILLILITER(S): at 06:04

## 2025-04-12 RX ADMIN — Medication 3 MILLILITER(S): at 21:02

## 2025-04-12 RX ADMIN — ATORVASTATIN CALCIUM 40 MILLIGRAM(S): 80 TABLET, FILM COATED ORAL at 21:49

## 2025-04-12 RX ADMIN — METOPROLOL SUCCINATE 50 MILLIGRAM(S): 50 TABLET, EXTENDED RELEASE ORAL at 06:20

## 2025-04-12 RX ADMIN — Medication 400 MILLIGRAM(S): at 10:56

## 2025-04-12 RX ADMIN — FUROSEMIDE 20 MILLIGRAM(S): 10 INJECTION INTRAMUSCULAR; INTRAVENOUS at 06:21

## 2025-04-12 RX ADMIN — LOSARTAN POTASSIUM 25 MILLIGRAM(S): 100 TABLET, FILM COATED ORAL at 06:21

## 2025-04-12 NOTE — PHYSICAL THERAPY INITIAL EVALUATION ADULT - AMBULATION SKILLS, REHAB EVAL
"Chief Complaints and History of Present Illnesses   Patient presents with     Blurred Vision Evaluation     Chief Complaint(s) and History of Present Illness(es)     Blurred Vision Evaluation     Laterality: both eyes    Quality: blurred vision    Context: distance vision and near vision    Associated symptoms: Negative for dryness, tearing, eye pain, flashes and floaters    Treatments tried: no treatments              Comments     Patient notes that her VA is blurry, lost gls, went to pharmacy and got OTC gls that don't work, when not wearing correction \"I feel pressure in both eyes\"     Charisma Rodgers COT December 5, 2019 8:55 AM                  " independent

## 2025-04-12 NOTE — PHYSICAL THERAPY INITIAL EVALUATION ADULT - PERTINENT HX OF CURRENT PROBLEM, REHAB EVAL
89 year old male who recently saw his cardiologist, Dr. Minaya complaining of worsening dyspnea for the last few months. In February 2025 he presented for possible left heart catheterization which was cancelled after JORDANA that day revealed thrombus in the left atrial appendage and his eliquis was switched to pradaxa. He was followed as an outpatient by Dr. Garrison for his valvular disease. On 4/11 he presented to [facility] after noticing worsening SOB and orthopnea. On arrival to ED patient saturating well on room air. CXR revealed possible moderate right pleural effusion. BNP >3000 and patient admitted to CTS service under Dr. Garrison for heart failure exacerbation.

## 2025-04-12 NOTE — PROGRESS NOTE ADULT - SUBJECTIVE AND OBJECTIVE BOX
Patient discussed on morning rounds with Dr. Louise    Evaluation of R pleural effusion     SUBJECTIVE ASSESSMENT:  89y Male without complaints, notes that his breathing is stable. Denies fever, chills, chest pain, sob, abd pain, n/v/d.     Vital Signs Last 24 Hrs  T(C): 36.4 (12 Apr 2025 08:42), Max: 36.8 (12 Apr 2025 01:07)  T(F): 97.5 (12 Apr 2025 08:42), Max: 98.2 (12 Apr 2025 01:07)  HR: 77 (12 Apr 2025 10:30) (60 - 80)  BP: 139/69 (12 Apr 2025 10:30) (120/68 - 162/74)  BP(mean): 107 (12 Apr 2025 08:42) (89 - 107)  RR: 18 (12 Apr 2025 08:42) (16 - 18)  SpO2: 98% (12 Apr 2025 10:30) (98% - 100%)    Parameters below as of 12 Apr 2025 10:30  Patient On (Oxygen Delivery Method): room air      I&O's Detail    11 Apr 2025 07:01  -  12 Apr 2025 07:00  --------------------------------------------------------  IN:    Oral Fluid: 480 mL  Total IN: 480 mL    OUT:    Voided (mL): 2275 mL  Total OUT: 2275 mL    Total NET: -1795 mL      12 Apr 2025 07:01  -  12 Apr 2025 12:11  --------------------------------------------------------  IN:    Oral Fluid: 180 mL  Total IN: 180 mL    OUT:    Voided (mL): 900 mL  Total OUT: 900 mL    Total NET: -720 mL    CHEST TUBE:  No.  CHHAYA DRAIN:  No.  EPICARDIAL WIRES: No.  TIE DOWNS: No.  ROYAL: No.    PHYSICAL EXAM:  Appearance: No acute distress.  Neurologic: AAOx3, no AMS or focal deficits.  Responds appropriately to verbal and physical stimuli; exhibits purposeful movement in all extremities.  Cardiovascular: RRR, S1 S2. No m/r/g.  Respiratory: +decreased R lung breath sounds. No acute respiratory distress. CTA b/l, no w/r/r.   Gastrointestinal:  Soft, non-tender, non-distended, + BS.	  Skin: No rashes. No ecchymoses. No cyanosis.  Extremities: Exhibits normal range of motion, no clubbing, cyanosis or edema.  Vascular: Peripheral pulses palpable 2+ bilaterally.      LABS:                        11.3   6.00  )-----------( 147      ( 12 Apr 2025 05:30 )             35.1       PT/INR - ( 12 Apr 2025 05:30 )   PT: 16.0 sec;   INR: 1.37          PTT - ( 12 Apr 2025 05:30 )  PTT:52.3 sec    04-12    140  |  106  |  28[H]  ----------------------------<  103[H]  4.4   |  24  |  1.45[H]    Ca    9.3      12 Apr 2025 05:30  Mg     1.8     04-12        Urinalysis Basic - ( 12 Apr 2025 05:30 )    Color: x / Appearance: x / SG: x / pH: x  Gluc: 103 mg/dL / Ketone: x  / Bili: x / Urobili: x   Blood: x / Protein: x / Nitrite: x   Leuk Esterase: x / RBC: x / WBC x   Sq Epi: x / Non Sq Epi: x / Bacteria: x        MEDICATIONS  (STANDING):  atorvastatin 40 milliGRAM(s) Oral at bedtime  ferrous    sulfate 325 milliGRAM(s) Oral daily  losartan 25 milliGRAM(s) Oral daily  metoprolol succinate ER 50 milliGRAM(s) Oral daily  sodium chloride 0.9% lock flush 3 milliLiter(s) IV Push every 8 hours    MEDICATIONS  (PRN):  acetaminophen     Tablet .. 650 milliGRAM(s) Oral every 6 hours PRN Mild Pain (1 - 3)        RADIOLOGY & ADDITIONAL TESTS:

## 2025-04-13 LAB
ALBUMIN SERPL ELPH-MCNC: 3.8 G/DL — SIGNIFICANT CHANGE UP (ref 3.3–5)
ALP SERPL-CCNC: 125 U/L — HIGH (ref 40–120)
ALT FLD-CCNC: 24 U/L — SIGNIFICANT CHANGE UP (ref 10–45)
ANION GAP SERPL CALC-SCNC: 11 MMOL/L — SIGNIFICANT CHANGE UP (ref 5–17)
APTT BLD: 43.1 SEC — HIGH (ref 24.5–35.6)
AST SERPL-CCNC: 25 U/L — SIGNIFICANT CHANGE UP (ref 10–40)
BILIRUB SERPL-MCNC: 1.2 MG/DL — SIGNIFICANT CHANGE UP (ref 0.2–1.2)
BUN SERPL-MCNC: 29 MG/DL — HIGH (ref 7–23)
CALCIUM SERPL-MCNC: 9.4 MG/DL — SIGNIFICANT CHANGE UP (ref 8.4–10.5)
CHLORIDE SERPL-SCNC: 106 MMOL/L — SIGNIFICANT CHANGE UP (ref 96–108)
CO2 SERPL-SCNC: 24 MMOL/L — SIGNIFICANT CHANGE UP (ref 22–31)
CREAT SERPL-MCNC: 1.26 MG/DL — SIGNIFICANT CHANGE UP (ref 0.5–1.3)
EGFR: 55 ML/MIN/1.73M2 — LOW
EGFR: 55 ML/MIN/1.73M2 — LOW
GLUCOSE SERPL-MCNC: 91 MG/DL — SIGNIFICANT CHANGE UP (ref 70–99)
HCT VFR BLD CALC: 38.8 % — LOW (ref 39–50)
HGB BLD-MCNC: 12.7 G/DL — LOW (ref 13–17)
INR BLD: 1.31 — HIGH (ref 0.85–1.16)
MAGNESIUM SERPL-MCNC: 1.8 MG/DL — SIGNIFICANT CHANGE UP (ref 1.6–2.6)
MCHC RBC-ENTMCNC: 31.5 PG — SIGNIFICANT CHANGE UP (ref 27–34)
MCHC RBC-ENTMCNC: 32.7 G/DL — SIGNIFICANT CHANGE UP (ref 32–36)
MCV RBC AUTO: 96.3 FL — SIGNIFICANT CHANGE UP (ref 80–100)
NRBC BLD AUTO-RTO: 0 /100 WBCS — SIGNIFICANT CHANGE UP (ref 0–0)
PLATELET # BLD AUTO: 165 K/UL — SIGNIFICANT CHANGE UP (ref 150–400)
POTASSIUM SERPL-MCNC: 4.6 MMOL/L — SIGNIFICANT CHANGE UP (ref 3.5–5.3)
POTASSIUM SERPL-SCNC: 4.6 MMOL/L — SIGNIFICANT CHANGE UP (ref 3.5–5.3)
PROT SERPL-MCNC: 7.8 G/DL — SIGNIFICANT CHANGE UP (ref 6–8.3)
PROTHROM AB SERPL-ACNC: 15 SEC — HIGH (ref 9.9–13.4)
RBC # BLD: 4.03 M/UL — LOW (ref 4.2–5.8)
RBC # FLD: 14.2 % — SIGNIFICANT CHANGE UP (ref 10.3–14.5)
SODIUM SERPL-SCNC: 141 MMOL/L — SIGNIFICANT CHANGE UP (ref 135–145)
WBC # BLD: 5.9 K/UL — SIGNIFICANT CHANGE UP (ref 3.8–10.5)
WBC # FLD AUTO: 5.9 K/UL — SIGNIFICANT CHANGE UP (ref 3.8–10.5)

## 2025-04-13 PROCEDURE — 99233 SBSQ HOSP IP/OBS HIGH 50: CPT

## 2025-04-13 PROCEDURE — 71045 X-RAY EXAM CHEST 1 VIEW: CPT | Mod: 26

## 2025-04-13 PROCEDURE — 71045 X-RAY EXAM CHEST 1 VIEW: CPT | Mod: 26,77

## 2025-04-13 PROCEDURE — 88305 TISSUE EXAM BY PATHOLOGIST: CPT | Mod: 26

## 2025-04-13 PROCEDURE — 88112 CYTOPATH CELL ENHANCE TECH: CPT | Mod: 26

## 2025-04-13 RX ORDER — LIDOCAINE HCL/PF 10 MG/ML
25 VIAL (ML) INJECTION ONCE
Refills: 0 | Status: COMPLETED | OUTPATIENT
Start: 2025-04-13 | End: 2025-04-13

## 2025-04-13 RX ORDER — HEPARIN SODIUM 1000 [USP'U]/ML
850 INJECTION INTRAVENOUS; SUBCUTANEOUS
Qty: 25000 | Refills: 0 | Status: DISCONTINUED | OUTPATIENT
Start: 2025-04-13 | End: 2025-04-14

## 2025-04-13 RX ORDER — TRAMADOL HYDROCHLORIDE 50 MG/1
25 TABLET, FILM COATED ORAL ONCE
Refills: 0 | Status: DISCONTINUED | OUTPATIENT
Start: 2025-04-13 | End: 2025-04-13

## 2025-04-13 RX ADMIN — Medication 325 MILLIGRAM(S): at 11:53

## 2025-04-13 RX ADMIN — Medication 20 MILLILITER(S): at 10:29

## 2025-04-13 RX ADMIN — Medication 650 MILLIGRAM(S): at 11:52

## 2025-04-13 RX ADMIN — HEPARIN SODIUM 8.5 UNIT(S)/HR: 1000 INJECTION INTRAVENOUS; SUBCUTANEOUS at 20:10

## 2025-04-13 RX ADMIN — Medication 3 MILLILITER(S): at 13:25

## 2025-04-13 RX ADMIN — TRAMADOL HYDROCHLORIDE 25 MILLIGRAM(S): 50 TABLET, FILM COATED ORAL at 14:45

## 2025-04-13 RX ADMIN — METOPROLOL SUCCINATE 50 MILLIGRAM(S): 50 TABLET, EXTENDED RELEASE ORAL at 05:37

## 2025-04-13 RX ADMIN — ATORVASTATIN CALCIUM 40 MILLIGRAM(S): 80 TABLET, FILM COATED ORAL at 21:38

## 2025-04-13 RX ADMIN — Medication 3 MILLILITER(S): at 21:31

## 2025-04-13 RX ADMIN — LOSARTAN POTASSIUM 25 MILLIGRAM(S): 100 TABLET, FILM COATED ORAL at 05:37

## 2025-04-13 RX ADMIN — Medication 3 MILLILITER(S): at 05:32

## 2025-04-13 RX ADMIN — TRAMADOL HYDROCHLORIDE 25 MILLIGRAM(S): 50 TABLET, FILM COATED ORAL at 13:57

## 2025-04-13 RX ADMIN — Medication 650 MILLIGRAM(S): at 12:45

## 2025-04-13 NOTE — PROGRESS NOTE ADULT - SUBJECTIVE AND OBJECTIVE BOX
Patient discussed on morning rounds with Dr. Louise    Operation / Date:       SUBJECTIVE ASSESSMENT:  89y Male noting some frustration regarding overall hospital stay. Denies fever, chills, chest pain, sob, abd pain, n/v/d.     Vital Signs Last 24 Hrs  T(C): 35.6 (13 Apr 2025 14:21), Max: 36.4 (13 Apr 2025 09:14)  T(F): 96 (13 Apr 2025 14:21), Max: 97.6 (13 Apr 2025 09:14)  HR: 60 (13 Apr 2025 14:00) (60 - 72)  BP: 114/63 (13 Apr 2025 14:00) (114/63 - 150/94)  BP(mean): 83 (13 Apr 2025 14:00) (83 - 112)  RR: 16 (13 Apr 2025 14:00) (16 - 18)  SpO2: 98% (13 Apr 2025 14:00) (94% - 100%)    Parameters below as of 13 Apr 2025 14:00  Patient On (Oxygen Delivery Method): room air      I&O's Detail    12 Apr 2025 07:01  -  13 Apr 2025 07:00  --------------------------------------------------------  IN:    Oral Fluid: 420 mL  Total IN: 420 mL    OUT:    Voided (mL): 1250 mL  Total OUT: 1250 mL    Total NET: -830 mL      13 Apr 2025 07:01  -  13 Apr 2025 16:50  --------------------------------------------------------  IN:    Oral Fluid: 150 mL  Total IN: 150 mL    OUT:    Chest Tube (mL): 1350 mL    Voided (mL): 300 mL  Total OUT: 1650 mL    Total NET: -1500 mL    CHEST TUBE: No.   CHHAYA DRAIN:  No.  EPICARDIAL WIRES: No.  TIE DOWNS: No.  ROYAL: No.    PHYSICAL EXAM:  Appearance: No acute distress.  Neurologic: AAOx3, no AMS or focal deficits.  Responds appropriately to verbal and physical stimuli; exhibits purposeful movement in all extremities.  Cardiovascular: RRR, S1 S2. No m/r/g.  Respiratory: No acute respiratory distress. CTA b/l, no w/r/r.   Gastrointestinal:  Soft, non-tender, non-distended, + BS.	  Skin: No rashes. No ecchymoses. No cyanosis.  Extremities: Exhibits normal range of motion, no clubbing, cyanosis or edema.  Vascular: Peripheral pulses palpable 2+ bilaterally.  Incision Sites: +R posterior pigtail removal site with dressing c/d/i    LABS:                        12.7   5.90  )-----------( 165      ( 13 Apr 2025 07:44 )             38.8   PT/INR - ( 13 Apr 2025 07:44 )   PT: 15.0 sec;   INR: 1.31          PTT - ( 13 Apr 2025 07:44 )  PTT:43.1 sec    04-13    141  |  106  |  29[H]  ----------------------------<  91  4.6   |  24  |  1.26    Ca    9.4      13 Apr 2025 07:44  Mg     1.8     04-13    TPro  7.8  /  Alb  3.8  /  TBili  1.2  /  DBili  x   /  AST  25  /  ALT  24  /  AlkPhos  125[H]  04-13      Urinalysis Basic - ( 13 Apr 2025 07:44 )    Color: x / Appearance: x / SG: x / pH: x  Gluc: 91 mg/dL / Ketone: x  / Bili: x / Urobili: x   Blood: x / Protein: x / Nitrite: x   Leuk Esterase: x / RBC: x / WBC x   Sq Epi: x / Non Sq Epi: x / Bacteria: x        MEDICATIONS  (STANDING):  atorvastatin 40 milliGRAM(s) Oral at bedtime  ferrous    sulfate 325 milliGRAM(s) Oral daily  losartan 25 milliGRAM(s) Oral daily  metoprolol succinate ER 50 milliGRAM(s) Oral daily  sodium chloride 0.9% lock flush 3 milliLiter(s) IV Push every 8 hours    MEDICATIONS  (PRN):  acetaminophen     Tablet .. 650 milliGRAM(s) Oral every 6 hours PRN Mild Pain (1 - 3)        RADIOLOGY & ADDITIONAL TESTS:

## 2025-04-13 NOTE — PROGRESS NOTE ADULT - NS ATTEND AMEND GEN_ALL_CORE FT
Agree with above with the following updates:    Pt remains stable. Likely intravascularly dry given bump in Cr after lasix. Will U/S right lung base to see if can be drained today or tomorrow based on timing.
Agree with above with the following updates    Pt is s/p 1.2 serous fluid removal from Rt Lung.  --Remove pigtail once cxr confirms no pneumo.  --Discussed at length the possibility of having procedure(s) done this week. Will f/u with pt as outpt.   --If procedure this week, will switch pt to Lovenox daily.

## 2025-04-13 NOTE — PROGRESS NOTE ADULT - ASSESSMENT
89yo M with family history of CAD and PMHx of HTN, HLD, Atrial Fibrillation (on pradaxa), PPM ([facility] in [place] followed by Dr. Galvez, last checked a few months ago), thoracic aortic aneurysm, history of Prostate CA, CKD (creatine 1.21), chronic diastolic heart failure with severe AS (PV 4.11 MG 43) who recently saw his cardiologist, Dr. Minaya complaining of worsening dyspnea for the last few months. In February 2025 he presented for possible left heart catheterization which was cancelled after JORDANA that day revealed thrombus in the left atrial appendage and his eliquis was switched to pradaxa. He was followed as an outpatient by Dr. Garrison for his Severe AS. On 4/11/25 he presented to [facility] after noticing worsening SOB and orthopnea while laying flat on one occasion at night, advised to present to the ED for further evaluation. On arrival to ED, patient saturating well on room air, CXR w/ small - mod pleural effusion to R lung, and BNP >3000. Patient admitted to CTS service under Dr. Garrison for heart failure exacerbation and all coags were held for possible pigtail placement. On 4/12/25, patient s/p bedside POCUS w/ moderate R pleural effusion, awaiting 48 hrs from last Pradaxa dose prior to pigtail placement. On 4/13/25, s/p R pigtail placement, drained 1300cc serous fluid, fluid cultures sent off and pigtail later was removed without incidence. Post removal CXR pending. Will plan to discharge patient on Lovenox (off Pradaxa) and tentative discharge tomorrow. Pending when to start Lovenox per Structural.      Neurovascular:   - No delirium. Pain well controlled with current regimen.  -Continue tylenol PRN    Cardiovascular:   - Severe AS:  Has been worked up for TAVR as outpatient (CT scans completed), plan for TAVR in the future (this admission next week vs. discharge and return as outpatient)  -Acute on chronic CHF exacerbation:   Received Lasix 20mg IVx2, Cr jumped, held diuresis today, Cr downtrended to 1.2  - THOR thrombus: per Dr. Walker most recent imaging with resolution, currently on pradaxa at home, structural would like to switch to lovenox will discuss when to start  -H/o Afib: on Pradaxa at home, holding at this time  Structural would like to switch patient to lovenox, will discuss when to restart.   -S/p PPM ([facility] 2018)  -HTN: c/w losartan and Toprol  -HLD: c/w atorvastatin 40 mg daily     Respiratory:   -02 Sat = 98% on RA.  -Encourage C+DB and Use of IS 10x / hr while awake.  -CXR: moderate R pleural effusion with atelectasis   S/p Bedside POCUS (4/12/25) with 3 rib spaces of fluid   Waited 48hrs prior to pigtail placement, now s/p R pigtail placement, -1300cc serous fluid output. Now removed, pending repeat CXR.     GI:   -PPX: pantoprazole 40mg daily   -PO Diet.    Renal / :  -Continue to monitor renal function: BUN 29, Cr 1.26   -Monitor I/O's.  -Replete electrolytes PRN  -CKD (Baseline Cr 1.2): holding diuresis at this time     Endocrine:    - No known hx DM or thyroid disease     Hematologic:  -CBC: RBC 4.03, Hgb 12.7, Plt 165  -Coagulation Panel: PTT 43.1, PT 15, INR 1.31  -Patient takes Pradaxa at home, now s/p pigtail placement and removal. Structural would like to switch patient to Lovenox. Determine when to start lovenox, ensure patient understands how to inject and agreeable to this.     ID:  -Pt remains afebrile with no elevation in WBC or signs of infection  -WBC 5.90  -Observe for SIRS/Sepsis Syndrome.    Prophylaxis:  -DVT prophylaxis on hold until Structural notes ok to start patient on Lovenox  -SCD's    Disposition:  -Telemetry, s/p pigtail placement w/ 1300cc serous fluid output, s/p removal.     
89 year old male with family history of CAD and PMHx of HTN, HLD, Atrial Fibrillation (on pradaxa), PPM ([facility] in [place] followed by Dr. Galvez, last checked a few months ago), thoracic aortic aneurysm, history of Prostate CA, CKD (creatine 1.21), chronic diastolic heart failure with severe AS (PV 4.11 MG 43) who recently saw his cardiologist, Dr. Minaya complaining of worsening dyspnea for the last few months. In February 2025 he presented for possible left heart catheterization which was cancelled after JORDANA that day revealed thrombus in the left atrial appendage and his eliquis was switched to pradaxa. He was followed as an outpatient by Dr. Garrison for his Severe AS. On 4/11/25 he presented to [facility] after noticing worsening SOB and orthopnea while laying flat on one occasion at night, advised to present to the ED for further evaluation. On arrival to ED, patient saturating well on room air, CXR w/ small - mod pleural effusion to R lung, and BNP >3000. Patient admitted to CTS service under Dr. Garrison for heart failure exacerbation and all coags were held for possible pigtail placement. On 4/12/25, patient s/p bedside POCUS w/ moderate R pleural effusion, awaiting repeat coags at 2PM for possible pigtial placement today w/ fluid cultures to send off.     Neurovascular:   - No delirium. Pain well controlled with current regimen.  -Continue tylenol PRN    Cardiovascular:   - Severe AS:  Has been worked up for TAVR as outpatient (CT scans completed), plan for TAVR in the future (this admission next week vs. discharge and return as outpatient)  -Acute on chronic CHF exacerbation:   Received Lasix 20mg IV yesterday and today, bump in kidney function holding diuresis today  - THOR thrombus: per Dr. Walker most recent imaging with resolution, currently on pradaxa at home, to start hep gtt once ptt normalizes  -H/o Afib: on Pradaxa at home, will start on hep gtt once pigtail completed. C/w Toprol 50mg daily  -S/p PPM ([facility] 2018)  -HTN: c/w losartan and Toprol  -HLD: c/w atorvastatin 40 mg daily     Respiratory:   -02 Sat = 98% on RA..  -Encourage C+DB and Use of IS 10x / hr while awake.  -CXR: moderate R pleural effusion with atelectasis   S/p Bedside POCUS (4/12/25) with 3 rib spaces of fluid   Awaiting coags to normalize before placing pigtial, will send fluid cultures per structural.     GI:   -PPX: pantoprazole 40mg daily   -PO Diet.    Renal / :  -Continue to monitor renal function.  -Monitor I/O's.  -Replete electrolytes PRN  -CKD (Baseline Cr 1.2): monitor kidney function while diuresing     Endocrine:    - No known hx DM or thyroid disease     Hematologic:  -CBC: RBC 3.68, Hgb 11.3, Plt 147  -Coagulation Panel: PTT 52.3, PT 16, INR 1.37  -Patient takes Pradaxa at home, awaiting coags to normalize for pigtial placement to drain R pleural effusion  Will start on hep gtt once pigtail completed and coags normalize    ID:  -Pt remains afebrile with no elevation in WBC or signs of infection  -WBC 6.00  -Observe for SIRS/Sepsis Syndrome.    Prophylaxis:  -DVT prophylaxis with hep gtt once coags normalize after pigtail placement, currently on hold  -SCD's    Disposition:  -Telemetry, pending pigtail to R lung once coags normalize.

## 2025-04-14 ENCOUNTER — NON-APPOINTMENT (OUTPATIENT)
Age: 89
End: 2025-04-14

## 2025-04-14 ENCOUNTER — TRANSCRIPTION ENCOUNTER (OUTPATIENT)
Age: 89
End: 2025-04-14

## 2025-04-14 VITALS
OXYGEN SATURATION: 97 % | SYSTOLIC BLOOD PRESSURE: 127 MMHG | DIASTOLIC BLOOD PRESSURE: 87 MMHG | HEART RATE: 66 BPM | RESPIRATION RATE: 16 BRPM

## 2025-04-14 LAB
ALBUMIN SERPL ELPH-MCNC: 3.4 G/DL — SIGNIFICANT CHANGE UP (ref 3.3–5)
ALP SERPL-CCNC: 119 U/L — SIGNIFICANT CHANGE UP (ref 40–120)
ALT FLD-CCNC: 21 U/L — SIGNIFICANT CHANGE UP (ref 10–45)
ANION GAP SERPL CALC-SCNC: 9 MMOL/L — SIGNIFICANT CHANGE UP (ref 5–17)
APTT BLD: 86.5 SEC — HIGH (ref 24.5–35.6)
AST SERPL-CCNC: 24 U/L — SIGNIFICANT CHANGE UP (ref 10–40)
BILIRUB SERPL-MCNC: 0.9 MG/DL — SIGNIFICANT CHANGE UP (ref 0.2–1.2)
BUN SERPL-MCNC: 25 MG/DL — HIGH (ref 7–23)
CALCIUM SERPL-MCNC: 9 MG/DL — SIGNIFICANT CHANGE UP (ref 8.4–10.5)
CHLORIDE SERPL-SCNC: 107 MMOL/L — SIGNIFICANT CHANGE UP (ref 96–108)
CO2 SERPL-SCNC: 24 MMOL/L — SIGNIFICANT CHANGE UP (ref 22–31)
CREAT SERPL-MCNC: 1.24 MG/DL — SIGNIFICANT CHANGE UP (ref 0.5–1.3)
EGFR: 56 ML/MIN/1.73M2 — LOW
EGFR: 56 ML/MIN/1.73M2 — LOW
GLUCOSE SERPL-MCNC: 104 MG/DL — HIGH (ref 70–99)
HCT VFR BLD CALC: 37.2 % — LOW (ref 39–50)
HGB BLD-MCNC: 11.9 G/DL — LOW (ref 13–17)
INR BLD: 1.26 — HIGH (ref 0.85–1.16)
MAGNESIUM SERPL-MCNC: 1.8 MG/DL — SIGNIFICANT CHANGE UP (ref 1.6–2.6)
MCHC RBC-ENTMCNC: 31 PG — SIGNIFICANT CHANGE UP (ref 27–34)
MCHC RBC-ENTMCNC: 32 G/DL — SIGNIFICANT CHANGE UP (ref 32–36)
MCV RBC AUTO: 96.9 FL — SIGNIFICANT CHANGE UP (ref 80–100)
NRBC BLD AUTO-RTO: 0 /100 WBCS — SIGNIFICANT CHANGE UP (ref 0–0)
PLATELET # BLD AUTO: 152 K/UL — SIGNIFICANT CHANGE UP (ref 150–400)
POTASSIUM SERPL-MCNC: 4.8 MMOL/L — SIGNIFICANT CHANGE UP (ref 3.5–5.3)
POTASSIUM SERPL-SCNC: 4.8 MMOL/L — SIGNIFICANT CHANGE UP (ref 3.5–5.3)
PROT SERPL-MCNC: 7.4 G/DL — SIGNIFICANT CHANGE UP (ref 6–8.3)
PROTHROM AB SERPL-ACNC: 14.7 SEC — HIGH (ref 9.9–13.4)
RBC # BLD: 3.84 M/UL — LOW (ref 4.2–5.8)
RBC # FLD: 14.4 % — SIGNIFICANT CHANGE UP (ref 10.3–14.5)
SODIUM SERPL-SCNC: 140 MMOL/L — SIGNIFICANT CHANGE UP (ref 135–145)
WBC # BLD: 5.9 K/UL — SIGNIFICANT CHANGE UP (ref 3.8–10.5)
WBC # FLD AUTO: 5.9 K/UL — SIGNIFICANT CHANGE UP (ref 3.8–10.5)

## 2025-04-14 PROCEDURE — 99239 HOSP IP/OBS DSCHRG MGMT >30: CPT

## 2025-04-14 PROCEDURE — 71045 X-RAY EXAM CHEST 1 VIEW: CPT | Mod: 26

## 2025-04-14 PROCEDURE — 99285 EMERGENCY DEPT VISIT HI MDM: CPT

## 2025-04-14 PROCEDURE — 71045 X-RAY EXAM CHEST 1 VIEW: CPT

## 2025-04-14 PROCEDURE — 87637 SARSCOV2&INF A&B&RSV AMP PRB: CPT

## 2025-04-14 PROCEDURE — 80053 COMPREHEN METABOLIC PANEL: CPT

## 2025-04-14 PROCEDURE — 87070 CULTURE OTHR SPECIMN AEROBIC: CPT

## 2025-04-14 PROCEDURE — 88112 CYTOPATH CELL ENHANCE TECH: CPT

## 2025-04-14 PROCEDURE — 84132 ASSAY OF SERUM POTASSIUM: CPT

## 2025-04-14 PROCEDURE — 97161 PT EVAL LOW COMPLEX 20 MIN: CPT

## 2025-04-14 PROCEDURE — 88305 TISSUE EXAM BY PATHOLOGIST: CPT

## 2025-04-14 PROCEDURE — 84295 ASSAY OF SERUM SODIUM: CPT

## 2025-04-14 PROCEDURE — 85610 PROTHROMBIN TIME: CPT

## 2025-04-14 PROCEDURE — 83615 LACTATE (LD) (LDH) ENZYME: CPT

## 2025-04-14 PROCEDURE — 82803 BLOOD GASES ANY COMBINATION: CPT

## 2025-04-14 PROCEDURE — 84484 ASSAY OF TROPONIN QUANT: CPT

## 2025-04-14 PROCEDURE — 84157 ASSAY OF PROTEIN OTHER: CPT

## 2025-04-14 PROCEDURE — 83880 ASSAY OF NATRIURETIC PEPTIDE: CPT

## 2025-04-14 PROCEDURE — 83605 ASSAY OF LACTIC ACID: CPT

## 2025-04-14 PROCEDURE — 85025 COMPLETE CBC W/AUTO DIFF WBC: CPT

## 2025-04-14 PROCEDURE — 83735 ASSAY OF MAGNESIUM: CPT

## 2025-04-14 PROCEDURE — 87205 SMEAR GRAM STAIN: CPT

## 2025-04-14 PROCEDURE — 80048 BASIC METABOLIC PNL TOTAL CA: CPT

## 2025-04-14 PROCEDURE — 87015 SPECIMEN INFECT AGNT CONCNTJ: CPT

## 2025-04-14 PROCEDURE — 36415 COLL VENOUS BLD VENIPUNCTURE: CPT

## 2025-04-14 PROCEDURE — 85027 COMPLETE CBC AUTOMATED: CPT

## 2025-04-14 PROCEDURE — 82330 ASSAY OF CALCIUM: CPT

## 2025-04-14 PROCEDURE — 85730 THROMBOPLASTIN TIME PARTIAL: CPT

## 2025-04-14 PROCEDURE — 87075 CULTR BACTERIA EXCEPT BLOOD: CPT

## 2025-04-14 RX ORDER — ACETAMINOPHEN 500 MG/5ML
2 LIQUID (ML) ORAL
Qty: 0 | Refills: 0 | DISCHARGE
Start: 2025-04-14

## 2025-04-14 RX ORDER — ENOXAPARIN SODIUM 100 MG/ML
70 INJECTION SUBCUTANEOUS EVERY 12 HOURS
Refills: 0 | Status: DISCONTINUED | OUTPATIENT
Start: 2025-04-14 | End: 2025-04-14

## 2025-04-14 RX ORDER — ENOXAPARIN SODIUM 100 MG/ML
70 INJECTION SUBCUTANEOUS
Qty: 4 | Refills: 0
Start: 2025-04-14 | End: 2025-04-17

## 2025-04-14 RX ADMIN — Medication 325 MILLIGRAM(S): at 09:01

## 2025-04-14 RX ADMIN — METOPROLOL SUCCINATE 50 MILLIGRAM(S): 50 TABLET, EXTENDED RELEASE ORAL at 06:02

## 2025-04-14 RX ADMIN — LOSARTAN POTASSIUM 25 MILLIGRAM(S): 100 TABLET, FILM COATED ORAL at 07:05

## 2025-04-14 RX ADMIN — ENOXAPARIN SODIUM 70 MILLIGRAM(S): 100 INJECTION SUBCUTANEOUS at 10:10

## 2025-04-14 NOTE — DISCHARGE NOTE PROVIDER - CARE PROVIDER_API CALL
Anthony Garrison  Interventional Cardiology  [address]  [place] [address]  Phone: [phone]  Fax: [phone]  Scheduled Appointment: 04/17/2025

## 2025-04-14 NOTE — DISCHARGE NOTE PROVIDER - HOSPITAL COURSE
Patient discussed on morning rounds with Dr. Louise    Primary Surgeon/Attending MD: Dr. Garrison  Referring Physician: No referring   _ _ _ _ _ _ _ _ _ _ _ _   HOSPITAL COURSE:     89yo M with family history of CAD and PMHx of HTN, HLD, Atrial Fibrillation (on pradaxa), PPM ([facility] in [place] followed by Dr. Galvez, last checked a few months ago), thoracic aortic aneurysm, history of Prostate CA, CKD (creatine 1.21), chronic diastolic heart failure with severe AS (PV 4.11 MG 43) who recently saw his cardiologist, Dr. Minaya complaining of worsening dyspnea for the last few months. In February 2025 he presented for possible left heart catheterization which was cancelled after JORDANA that day revealed thrombus in the left atrial appendage and his eliquis was switched to pradaxa. He was followed as an outpatient by Dr. Garrison for his Severe AS. On 4/11/25 he presented to [facility] after noticing worsening SOB and orthopnea while laying flat on one occasion at night, advised to present to the ED for further evaluation. On arrival to ED, patient saturating well on room air, CXR w/ small - mod pleural effusion to R lung, and BNP >3000. Patient admitted to CTS service under Dr. Garrison for heart failure exacerbation and all coags were held for possible pigtail placement. On 4/12/25, patient s/p bedside POCUS w/ moderate R pleural effusion, awaiting 48 hrs from last Pradaxa dose prior to pigtail placement. On 4/13/25, s/p R pigtail placement, drained 1300cc serous fluid, fluid cultures sent off and pigtail later was removed without incidence. Post removal CXR unremarkable. 4/14/25 heparin was stopped and Lovenox was started. Patient is to return on Thursday, 4/17 for TAVR - patient advised to take last Lovenox dose Wednesday AM. Cleared for discharge per Dr. Louise and Dr. Garrison. At time of discharge he is hemodynamically stable, voiding well, passing gas, ambulating in the hallway, and pain controlled under oral regimen.    _ _ _ _ _ _ _ _ _ _ _ _   DISCHARGE PHYSICAL EXAM:  General: Sitting in bed comfortably in NAD  Neuro: A&Ox3, no focal deficits   HEENT: NCAT, EOMI   Cardiac: Regular rate and rhythm, normal S1 and S2. + murmur   Pulm: Breathing comfortably on room air. No signs of respiratory distress. Lungs are CTA b/l without wheezes, rales, or rhonchi   Abdomen: Soft, non-distended, non-tender. + bowel sounds   Extremities: Warm and well perfused, no peripheral edema, distal pulses 2+. No calf tenderness. SCDs and TEDs in place  MSK: Full AROM   Wound: R pigtail insertion site is dressed and dry  _ _ _ _ _ _ _ _ _ _ _ _   MEDICATION DISCHARGE CHECKLIST   Anticoagulation        Lovenox, Afib      - 80 mg IV BID       - Last dose Wednesday AM   _ _ _ _ _ _ _ _ _ _ _ _   Over 35 minutes was spent with the patient reviewing the discharge material including medications, follow up appointments, recovery, concerning symptoms, and how to contact their health care providers if they have questions.

## 2025-04-14 NOTE — DISCHARGE NOTE PROVIDER - NSDCFUSCHEDAPPT_GEN_ALL_CORE_FT
Suzie Ashley  [facility] Physician [facility]  CTSURG [address]  Scheduled Appointment: 04/18/2025    [facility]  HEARTVASC [address]  Scheduled Appointment: 04/23/2025    Tracy Minaya  [facility]  HEARTVASC [address]  Scheduled Appointment: 04/23/2025

## 2025-04-14 NOTE — DISCHARGE NOTE PROVIDER - NSDCFUADDAPPT_GEN_ALL_CORE_FT
Please return to [facility] on Thursday 4/17/25 for your TAVR procedure.     Last dose of Lovenox is to be on the morning of Wednesday 4/16/25 (4 doses total)

## 2025-04-14 NOTE — DISCHARGE NOTE NURSING/CASE MANAGEMENT/SOCIAL WORK - PATIENT PORTAL LINK FT
You can access the FollowMyHealth Patient Portal offered by [facility] by registering at the following website: [email or web address]. By joining [employer]’s FollowMyHealth portal, you will also be able to view your health information using other applications (apps) compatible with our system.

## 2025-04-14 NOTE — DISCHARGE NOTE NURSING/CASE MANAGEMENT/SOCIAL WORK - NSDCPEFALRISK_GEN_ALL_CORE
For information on Fall & Injury Prevention, visit: [email or web address] OR  [email or web address] OR  https://www.cdc.gov/steadi/patient.html

## 2025-04-14 NOTE — DISCHARGE NOTE PROVIDER - NSDCMRMEDTOKEN_GEN_ALL_CORE_FT
acetaminophen 325 mg oral tablet: 2 tab(s) orally every 6 hours As needed Mild Pain (1 - 3)  atorvastatin 40 mg oral tablet: 1 tab(s) orally once a day  enoxaparin 80 mg/0.8 mL injectable solution: 70 milligram(s) subcutaneously every 12 hours Last dose Wednesday (4/16/25) morning  ferrous sulfate 324 mg (65 mg elemental iron) oral tablet: orally every 48 hours  Lasix 20 mg oral tablet: 1 tab(s) orally every other day  metoprolol succinate 50 mg oral capsule, extended release: 1 cap(s) orally once a day

## 2025-04-14 NOTE — DISCHARGE NOTE PROVIDER - NSDCHHCONTACT_GEN_ALL_CORE_FT
9 As certified below, I, or a nurse practitioner or physician assistant working with me, had a face-to-face encounter that meets the physician face-to-face encounter requirements.

## 2025-04-14 NOTE — DISCHARGE NOTE NURSING/CASE MANAGEMENT/SOCIAL WORK - NSDCFUADDAPPT_GEN_ALL_CORE_FT
Please return to [facility] on Thursday 4/17/25 for your TAVR procedure.     Last dose of Lovenox is to be on the morning of Wednesday 4/16/25 (4 doses total)

## 2025-04-14 NOTE — DISCHARGE NOTE PROVIDER - CARE PROVIDERS DIRECT ADDRESSES
[email or web address]

## 2025-04-14 NOTE — DISCHARGE NOTE PROVIDER - NSDCFUADDINST_GEN_ALL_CORE_FT
-Walk daily as tolerated and use your incentive spirometer 10 times every hour while you are awake.     -Please weigh yourself daily. If you notice over a 3 pound weight gain in 3 days, this is a sign you are likely retaining too much fluid. It is imperative you call our right away with unexplained rapid weight gain.      -Please continue to wear the compression stockings given to you in the hospital at home. This is a way to prevent fluid from building up in your legs.     -No driving or strenuous activity/exercise until cleared by your surgeon.    -Gently clean your incisions with unscented/antibacterial soap and water, pat dry.  You may leave them open to air.    -Call your doctor if you have shortness of breath, chest pain not relieved by pain medication, dizziness, fever >101.5, or increased redness or drainage from incisions.

## 2025-04-14 NOTE — DISCHARGE NOTE NURSING/CASE MANAGEMENT/SOCIAL WORK - FINANCIAL ASSISTANCE
[facility] provides services at a reduced cost to those who are determined to be eligible through [facility]’s financial assistance program. Information regarding [facility]’s financial assistance program can be found by going to [email or web address] or by calling [phone].

## 2025-04-16 VITALS
OXYGEN SATURATION: 99 % | TEMPERATURE: 98 F | WEIGHT: 160.94 LBS | HEIGHT: 73 IN | HEART RATE: 65 BPM | DIASTOLIC BLOOD PRESSURE: 65 MMHG | RESPIRATION RATE: 16 BRPM | SYSTOLIC BLOOD PRESSURE: 147 MMHG

## 2025-04-16 NOTE — PATIENT PROFILE ADULT - FALL HARM RISK - HARM RISK INTERVENTIONS

## 2025-04-16 NOTE — PATIENT PROFILE ADULT - NSPROEXTENSIONSOFSELF_GEN_A_NUR
cardiac monitor.      Emili Herrera RN  03/20/22 [employer]

## 2025-04-17 ENCOUNTER — RESULT REVIEW (OUTPATIENT)
Age: 89
End: 2025-04-17

## 2025-04-17 ENCOUNTER — APPOINTMENT (OUTPATIENT)
Dept: CARDIOTHORACIC SURGERY | Facility: HOSPITAL | Age: 89
End: 2025-04-17

## 2025-04-17 ENCOUNTER — INPATIENT (INPATIENT)
Facility: HOSPITAL | Age: 89
LOS: 0 days | Discharge: HOME HEALTH | End: 2025-04-18
Attending: INTERNAL MEDICINE | Admitting: INTERNAL MEDICINE
Payer: MEDICARE

## 2025-04-17 DIAGNOSIS — Z98.890 OTHER SPECIFIED POSTPROCEDURAL STATES: Chronic | ICD-10-CM

## 2025-04-17 DIAGNOSIS — Z95.0 PRESENCE OF CARDIAC PACEMAKER: Chronic | ICD-10-CM

## 2025-04-17 LAB
ALBUMIN SERPL ELPH-MCNC: 3.4 G/DL — SIGNIFICANT CHANGE UP (ref 3.3–5)
ALBUMIN SERPL ELPH-MCNC: 3.6 G/DL — SIGNIFICANT CHANGE UP (ref 3.3–5)
ALBUMIN SERPL ELPH-MCNC: 3.7 G/DL — SIGNIFICANT CHANGE UP (ref 3.3–5)
ALBUMIN SERPL ELPH-MCNC: 4 G/DL — SIGNIFICANT CHANGE UP (ref 3.3–5)
ALP SERPL-CCNC: 107 U/L — SIGNIFICANT CHANGE UP (ref 40–120)
ALP SERPL-CCNC: 110 U/L — SIGNIFICANT CHANGE UP (ref 40–120)
ALP SERPL-CCNC: 112 U/L — SIGNIFICANT CHANGE UP (ref 40–120)
ALP SERPL-CCNC: 132 U/L — HIGH (ref 40–120)
ALT FLD-CCNC: 18 U/L — SIGNIFICANT CHANGE UP (ref 10–45)
ALT FLD-CCNC: 19 U/L — SIGNIFICANT CHANGE UP (ref 10–45)
ALT FLD-CCNC: 19 U/L — SIGNIFICANT CHANGE UP (ref 10–45)
ALT FLD-CCNC: 24 U/L — SIGNIFICANT CHANGE UP (ref 10–45)
ANION GAP SERPL CALC-SCNC: 11 MMOL/L — SIGNIFICANT CHANGE UP (ref 5–17)
ANION GAP SERPL CALC-SCNC: 12 MMOL/L — SIGNIFICANT CHANGE UP (ref 5–17)
ANION GAP SERPL CALC-SCNC: 15 MMOL/L — SIGNIFICANT CHANGE UP (ref 5–17)
ANION GAP SERPL CALC-SCNC: 15 MMOL/L — SIGNIFICANT CHANGE UP (ref 5–17)
APTT BLD: 35.5 SEC — SIGNIFICANT CHANGE UP (ref 24.5–35.6)
APTT BLD: >200 SEC — CRITICAL HIGH (ref 24.5–35.6)
AST SERPL-CCNC: 25 U/L — SIGNIFICANT CHANGE UP (ref 10–40)
AST SERPL-CCNC: 26 U/L — SIGNIFICANT CHANGE UP (ref 10–40)
AST SERPL-CCNC: 27 U/L — SIGNIFICANT CHANGE UP (ref 10–40)
AST SERPL-CCNC: 35 U/L — SIGNIFICANT CHANGE UP (ref 10–40)
BASOPHILS # BLD AUTO: 0.02 K/UL — SIGNIFICANT CHANGE UP (ref 0–0.2)
BASOPHILS # BLD AUTO: 0.02 K/UL — SIGNIFICANT CHANGE UP (ref 0–0.2)
BASOPHILS # BLD AUTO: 0.03 K/UL — SIGNIFICANT CHANGE UP (ref 0–0.2)
BASOPHILS NFR BLD AUTO: 0.3 % — SIGNIFICANT CHANGE UP (ref 0–2)
BASOPHILS NFR BLD AUTO: 0.3 % — SIGNIFICANT CHANGE UP (ref 0–2)
BASOPHILS NFR BLD AUTO: 0.6 % — SIGNIFICANT CHANGE UP (ref 0–2)
BILIRUB SERPL-MCNC: 0.8 MG/DL — SIGNIFICANT CHANGE UP (ref 0.2–1.2)
BILIRUB SERPL-MCNC: 1.1 MG/DL — SIGNIFICANT CHANGE UP (ref 0.2–1.2)
BLD GP AB SCN SERPL QL: NEGATIVE — SIGNIFICANT CHANGE UP
BUN SERPL-MCNC: 25 MG/DL — HIGH (ref 7–23)
BUN SERPL-MCNC: 25 MG/DL — HIGH (ref 7–23)
BUN SERPL-MCNC: 26 MG/DL — HIGH (ref 7–23)
BUN SERPL-MCNC: 28 MG/DL — HIGH (ref 7–23)
CALCIUM SERPL-MCNC: 8.6 MG/DL — SIGNIFICANT CHANGE UP (ref 8.4–10.5)
CALCIUM SERPL-MCNC: 8.7 MG/DL — SIGNIFICANT CHANGE UP (ref 8.4–10.5)
CALCIUM SERPL-MCNC: 8.7 MG/DL — SIGNIFICANT CHANGE UP (ref 8.4–10.5)
CALCIUM SERPL-MCNC: 9.2 MG/DL — SIGNIFICANT CHANGE UP (ref 8.4–10.5)
CHLORIDE SERPL-SCNC: 102 MMOL/L — SIGNIFICANT CHANGE UP (ref 96–108)
CHLORIDE SERPL-SCNC: 104 MMOL/L — SIGNIFICANT CHANGE UP (ref 96–108)
CHLORIDE SERPL-SCNC: 108 MMOL/L — SIGNIFICANT CHANGE UP (ref 96–108)
CHLORIDE SERPL-SCNC: 109 MMOL/L — HIGH (ref 96–108)
CO2 SERPL-SCNC: 20 MMOL/L — LOW (ref 22–31)
CO2 SERPL-SCNC: 21 MMOL/L — LOW (ref 22–31)
CO2 SERPL-SCNC: 22 MMOL/L — SIGNIFICANT CHANGE UP (ref 22–31)
CO2 SERPL-SCNC: 22 MMOL/L — SIGNIFICANT CHANGE UP (ref 22–31)
CREAT SERPL-MCNC: 1.19 MG/DL — SIGNIFICANT CHANGE UP (ref 0.5–1.3)
CREAT SERPL-MCNC: 1.25 MG/DL — SIGNIFICANT CHANGE UP (ref 0.5–1.3)
CREAT SERPL-MCNC: 1.3 MG/DL — SIGNIFICANT CHANGE UP (ref 0.5–1.3)
CREAT SERPL-MCNC: 1.41 MG/DL — HIGH (ref 0.5–1.3)
EGFR: 48 ML/MIN/1.73M2 — LOW
EGFR: 48 ML/MIN/1.73M2 — LOW
EGFR: 53 ML/MIN/1.73M2 — LOW
EGFR: 53 ML/MIN/1.73M2 — LOW
EGFR: 55 ML/MIN/1.73M2 — LOW
EGFR: 55 ML/MIN/1.73M2 — LOW
EGFR: 58 ML/MIN/1.73M2 — LOW
EGFR: 58 ML/MIN/1.73M2 — LOW
EOSINOPHIL # BLD AUTO: 0.03 K/UL — SIGNIFICANT CHANGE UP (ref 0–0.5)
EOSINOPHIL # BLD AUTO: 0.05 K/UL — SIGNIFICANT CHANGE UP (ref 0–0.5)
EOSINOPHIL # BLD AUTO: 0.08 K/UL — SIGNIFICANT CHANGE UP (ref 0–0.5)
EOSINOPHIL NFR BLD AUTO: 0.5 % — SIGNIFICANT CHANGE UP (ref 0–6)
EOSINOPHIL NFR BLD AUTO: 0.9 % — SIGNIFICANT CHANGE UP (ref 0–6)
EOSINOPHIL NFR BLD AUTO: 1.5 % — SIGNIFICANT CHANGE UP (ref 0–6)
GAS PNL BLDA: SIGNIFICANT CHANGE UP
GLUCOSE SERPL-MCNC: 109 MG/DL — HIGH (ref 70–99)
GLUCOSE SERPL-MCNC: 118 MG/DL — HIGH (ref 70–99)
GLUCOSE SERPL-MCNC: 120 MG/DL — HIGH (ref 70–99)
GLUCOSE SERPL-MCNC: 123 MG/DL — HIGH (ref 70–99)
HCT VFR BLD CALC: 30.7 % — LOW (ref 39–50)
HCT VFR BLD CALC: 30.8 % — LOW (ref 39–50)
HCT VFR BLD CALC: 31.9 % — LOW (ref 39–50)
HCT VFR BLD CALC: 36.8 % — LOW (ref 39–50)
HGB BLD-MCNC: 10 G/DL — LOW (ref 13–17)
HGB BLD-MCNC: 10.1 G/DL — LOW (ref 13–17)
HGB BLD-MCNC: 10.3 G/DL — LOW (ref 13–17)
HGB BLD-MCNC: 11.8 G/DL — LOW (ref 13–17)
IMM GRANULOCYTES NFR BLD AUTO: 0.2 % — SIGNIFICANT CHANGE UP (ref 0–0.9)
IMM GRANULOCYTES NFR BLD AUTO: 0.2 % — SIGNIFICANT CHANGE UP (ref 0–0.9)
IMM GRANULOCYTES NFR BLD AUTO: 0.3 % — SIGNIFICANT CHANGE UP (ref 0–0.9)
INR BLD: 1.06 — SIGNIFICANT CHANGE UP (ref 0.85–1.16)
INR BLD: 1.24 — HIGH (ref 0.85–1.16)
INR BLD: 1.31 — HIGH (ref 0.85–1.16)
LYMPHOCYTES # BLD AUTO: 0.89 K/UL — LOW (ref 1–3.3)
LYMPHOCYTES # BLD AUTO: 0.9 K/UL — LOW (ref 1–3.3)
LYMPHOCYTES # BLD AUTO: 1.1 K/UL — SIGNIFICANT CHANGE UP (ref 1–3.3)
LYMPHOCYTES # BLD AUTO: 13.9 % — SIGNIFICANT CHANGE UP (ref 13–44)
LYMPHOCYTES # BLD AUTO: 15.4 % — SIGNIFICANT CHANGE UP (ref 13–44)
LYMPHOCYTES # BLD AUTO: 21.1 % — SIGNIFICANT CHANGE UP (ref 13–44)
MAGNESIUM SERPL-MCNC: 1.8 MG/DL — SIGNIFICANT CHANGE UP (ref 1.6–2.6)
MAGNESIUM SERPL-MCNC: 2.1 MG/DL — SIGNIFICANT CHANGE UP (ref 1.6–2.6)
MCHC RBC-ENTMCNC: 31 PG — SIGNIFICANT CHANGE UP (ref 27–34)
MCHC RBC-ENTMCNC: 31 PG — SIGNIFICANT CHANGE UP (ref 27–34)
MCHC RBC-ENTMCNC: 31.3 PG — SIGNIFICANT CHANGE UP (ref 27–34)
MCHC RBC-ENTMCNC: 31.3 PG — SIGNIFICANT CHANGE UP (ref 27–34)
MCHC RBC-ENTMCNC: 32.1 G/DL — SIGNIFICANT CHANGE UP (ref 32–36)
MCHC RBC-ENTMCNC: 32.3 G/DL — SIGNIFICANT CHANGE UP (ref 32–36)
MCHC RBC-ENTMCNC: 32.6 G/DL — SIGNIFICANT CHANGE UP (ref 32–36)
MCHC RBC-ENTMCNC: 32.8 G/DL — SIGNIFICANT CHANGE UP (ref 32–36)
MCV RBC AUTO: 95.4 FL — SIGNIFICANT CHANGE UP (ref 80–100)
MCV RBC AUTO: 95.9 FL — SIGNIFICANT CHANGE UP (ref 80–100)
MCV RBC AUTO: 96.1 FL — SIGNIFICANT CHANGE UP (ref 80–100)
MCV RBC AUTO: 96.6 FL — SIGNIFICANT CHANGE UP (ref 80–100)
MONOCYTES # BLD AUTO: 0.5 K/UL — SIGNIFICANT CHANGE UP (ref 0–0.9)
MONOCYTES # BLD AUTO: 0.55 K/UL — SIGNIFICANT CHANGE UP (ref 0–0.9)
MONOCYTES # BLD AUTO: 0.57 K/UL — SIGNIFICANT CHANGE UP (ref 0–0.9)
MONOCYTES NFR BLD AUTO: 10.9 % — SIGNIFICANT CHANGE UP (ref 2–14)
MONOCYTES NFR BLD AUTO: 8.5 % — SIGNIFICANT CHANGE UP (ref 2–14)
MONOCYTES NFR BLD AUTO: 8.6 % — SIGNIFICANT CHANGE UP (ref 2–14)
NEUTROPHILS # BLD AUTO: 3.42 K/UL — SIGNIFICANT CHANGE UP (ref 1.8–7.4)
NEUTROPHILS # BLD AUTO: 4.36 K/UL — SIGNIFICANT CHANGE UP (ref 1.8–7.4)
NEUTROPHILS # BLD AUTO: 4.91 K/UL — SIGNIFICANT CHANGE UP (ref 1.8–7.4)
NEUTROPHILS NFR BLD AUTO: 65.7 % — SIGNIFICANT CHANGE UP (ref 43–77)
NEUTROPHILS NFR BLD AUTO: 74.6 % — SIGNIFICANT CHANGE UP (ref 43–77)
NEUTROPHILS NFR BLD AUTO: 76.5 % — SIGNIFICANT CHANGE UP (ref 43–77)
NRBC BLD AUTO-RTO: 0 /100 WBCS — SIGNIFICANT CHANGE UP (ref 0–0)
NT-PROBNP SERPL-SCNC: 3853 PG/ML — HIGH (ref 0–300)
PHOSPHATE SERPL-MCNC: 3.7 MG/DL — SIGNIFICANT CHANGE UP (ref 2.5–4.5)
PLATELET # BLD AUTO: 127 K/UL — LOW (ref 150–400)
PLATELET # BLD AUTO: 140 K/UL — LOW (ref 150–400)
PLATELET # BLD AUTO: 141 K/UL — LOW (ref 150–400)
PLATELET # BLD AUTO: 158 K/UL — SIGNIFICANT CHANGE UP (ref 150–400)
POTASSIUM SERPL-MCNC: 4.4 MMOL/L — SIGNIFICANT CHANGE UP (ref 3.5–5.3)
POTASSIUM SERPL-MCNC: 4.5 MMOL/L — SIGNIFICANT CHANGE UP (ref 3.5–5.3)
POTASSIUM SERPL-SCNC: 4.4 MMOL/L — SIGNIFICANT CHANGE UP (ref 3.5–5.3)
POTASSIUM SERPL-SCNC: 4.5 MMOL/L — SIGNIFICANT CHANGE UP (ref 3.5–5.3)
PROT SERPL-MCNC: 7 G/DL — SIGNIFICANT CHANGE UP (ref 6–8.3)
PROT SERPL-MCNC: 7 G/DL — SIGNIFICANT CHANGE UP (ref 6–8.3)
PROT SERPL-MCNC: 7.1 G/DL — SIGNIFICANT CHANGE UP (ref 6–8.3)
PROT SERPL-MCNC: 8.7 G/DL — HIGH (ref 6–8.3)
PROTHROM AB SERPL-ACNC: 12.4 SEC — SIGNIFICANT CHANGE UP (ref 9.9–13.4)
PROTHROM AB SERPL-ACNC: 14.5 SEC — HIGH (ref 9.9–13.4)
PROTHROM AB SERPL-ACNC: 15.3 SEC — HIGH (ref 9.9–13.4)
RBC # BLD: 3.2 M/UL — LOW (ref 4.2–5.8)
RBC # BLD: 3.23 M/UL — LOW (ref 4.2–5.8)
RBC # BLD: 3.32 M/UL — LOW (ref 4.2–5.8)
RBC # BLD: 3.81 M/UL — LOW (ref 4.2–5.8)
RBC # FLD: 13.9 % — SIGNIFICANT CHANGE UP (ref 10.3–14.5)
RBC # FLD: 13.9 % — SIGNIFICANT CHANGE UP (ref 10.3–14.5)
RBC # FLD: 14.1 % — SIGNIFICANT CHANGE UP (ref 10.3–14.5)
RBC # FLD: 14.1 % — SIGNIFICANT CHANGE UP (ref 10.3–14.5)
RH IG SCN BLD-IMP: POSITIVE — SIGNIFICANT CHANGE UP
SODIUM SERPL-SCNC: 139 MMOL/L — SIGNIFICANT CHANGE UP (ref 135–145)
SODIUM SERPL-SCNC: 139 MMOL/L — SIGNIFICANT CHANGE UP (ref 135–145)
SODIUM SERPL-SCNC: 141 MMOL/L — SIGNIFICANT CHANGE UP (ref 135–145)
SODIUM SERPL-SCNC: 142 MMOL/L — SIGNIFICANT CHANGE UP (ref 135–145)
WBC # BLD: 5.01 K/UL — SIGNIFICANT CHANGE UP (ref 3.8–10.5)
WBC # BLD: 5.21 K/UL — SIGNIFICANT CHANGE UP (ref 3.8–10.5)
WBC # BLD: 5.85 K/UL — SIGNIFICANT CHANGE UP (ref 3.8–10.5)
WBC # BLD: 6.41 K/UL — SIGNIFICANT CHANGE UP (ref 3.8–10.5)
WBC # FLD AUTO: 5.01 K/UL — SIGNIFICANT CHANGE UP (ref 3.8–10.5)
WBC # FLD AUTO: 5.21 K/UL — SIGNIFICANT CHANGE UP (ref 3.8–10.5)
WBC # FLD AUTO: 5.85 K/UL — SIGNIFICANT CHANGE UP (ref 3.8–10.5)
WBC # FLD AUTO: 6.41 K/UL — SIGNIFICANT CHANGE UP (ref 3.8–10.5)

## 2025-04-17 PROCEDURE — 92986 REVISION OF AORTIC VALVE: CPT

## 2025-04-17 PROCEDURE — 71045 X-RAY EXAM CHEST 1 VIEW: CPT | Mod: 26

## 2025-04-17 PROCEDURE — 33340 PERQ CLSR TCAT L ATR APNDGE: CPT | Mod: Q0

## 2025-04-17 PROCEDURE — 93010 ELECTROCARDIOGRAM REPORT: CPT

## 2025-04-17 PROCEDURE — 93306 TTE W/DOPPLER COMPLETE: CPT | Mod: 26

## 2025-04-17 PROCEDURE — 93452 LEFT HRT CATH W/VENTRCLGRPHY: CPT | Mod: 26,59

## 2025-04-17 DEVICE — PACING CATH PACEL RIGHT HEART CURVE 5FR KIT: Type: IMPLANTABLE DEVICE | Status: FUNCTIONAL

## 2025-04-17 DEVICE — IMPLANTABLE DEVICE: Type: IMPLANTABLE DEVICE | Status: FUNCTIONAL

## 2025-04-17 DEVICE — INTRODUCER SHEATH KIT GLIDESHEATH SLENDER FLEX STRAIGHT 21G 6F X 10CM: Type: IMPLANTABLE DEVICE | Status: FUNCTIONAL

## 2025-04-17 DEVICE — ANGIO CATH GLIDECATH ANGLE 4FR X 120CM: Type: IMPLANTABLE DEVICE | Status: FUNCTIONAL

## 2025-04-17 DEVICE — WIRE GD CONFIDA BRECKER CRV .035X260: Type: IMPLANTABLE DEVICE | Status: FUNCTIONAL

## 2025-04-17 DEVICE — GUIDEWIRE STANDARD STRAIGHT .035" X 180CM: Type: IMPLANTABLE DEVICE | Status: FUNCTIONAL

## 2025-04-17 DEVICE — VASCADE MVP VVCS XL: Type: IMPLANTABLE DEVICE | Status: FUNCTIONAL

## 2025-04-17 DEVICE — GUIDEWIRE LUNDERQUIST EXTRA-STIFF DOUBLE CURVED .035" X 260CM: Type: IMPLANTABLE DEVICE | Status: FUNCTIONAL

## 2025-04-17 DEVICE — SHEATH INTRODUCER TERUMO PINNACLE CORONARY 8FR X 25CM: Type: IMPLANTABLE DEVICE | Status: FUNCTIONAL

## 2025-04-17 DEVICE — GWIRE GUID  0.035INX150CM: Type: IMPLANTABLE DEVICE | Status: FUNCTIONAL

## 2025-04-17 DEVICE — CATH DX AL1 INFIN 5FRX100CM: Type: IMPLANTABLE DEVICE | Status: FUNCTIONAL

## 2025-04-17 DEVICE — INTRO MICROPUNC 4FRX10CM SS: Type: IMPLANTABLE DEVICE | Status: FUNCTIONAL

## 2025-04-17 DEVICE — CATH DX JL5 INFIN 5FRX100CM: Type: IMPLANTABLE DEVICE | Status: FUNCTIONAL

## 2025-04-17 DEVICE — SHEATH INTRODUCER TERUMO PINNACLE CORONARY 8FR X 10CM X 0.038" MINI WIRE: Type: IMPLANTABLE DEVICE | Status: FUNCTIONAL

## 2025-04-17 DEVICE — WIRE ASAHI GRAND SLAM 300CM: Type: IMPLANTABLE DEVICE | Status: FUNCTIONAL

## 2025-04-17 DEVICE — CATH DX JR4 INFIN 5FRX100CM: Type: IMPLANTABLE DEVICE | Status: FUNCTIONAL

## 2025-04-17 DEVICE — WIRE GD BMW UNIV II 300CM: Type: IMPLANTABLE DEVICE | Status: FUNCTIONAL

## 2025-04-17 DEVICE — SUT PERCLOSE PROGLIDE 6FR: Type: IMPLANTABLE DEVICE | Status: FUNCTIONAL

## 2025-04-17 DEVICE — GWIRE JTIP 1.5MM .035X180CM: Type: IMPLANTABLE DEVICE | Status: FUNCTIONAL

## 2025-04-17 DEVICE — KIT VERSACROSS CONNECT LAAC ACCESS 230-P RF WIRE 85CM: Type: IMPLANTABLE DEVICE | Status: FUNCTIONAL

## 2025-04-17 DEVICE — GUIDEWIRE TERUMO GLIDEWIRE STIFF STRAGHT .035" X 180CM: Type: IMPLANTABLE DEVICE | Status: FUNCTIONAL

## 2025-04-17 DEVICE — CATH BLLN TRU FLOW 22MMX3.5CM: Type: IMPLANTABLE DEVICE | Status: FUNCTIONAL

## 2025-04-17 DEVICE — WIRE GD SAFARI2 275CM XSML CRV: Type: IMPLANTABLE DEVICE | Status: FUNCTIONAL

## 2025-04-17 DEVICE — SHEATH INTRODUCER TERUMO PINNACLE CORONARY 11FR X 10CM X 0.038" MINI WIRE: Type: IMPLANTABLE DEVICE | Status: FUNCTIONAL

## 2025-04-17 DEVICE — SYS CEREBRAL PROT NCT04149535 SENTINEL FOR STUDY ONLY: Type: IMPLANTABLE DEVICE | Status: FUNCTIONAL

## 2025-04-17 DEVICE — GWIRE SUPRACORE .035X370: Type: IMPLANTABLE DEVICE | Status: FUNCTIONAL

## 2025-04-17 DEVICE — DRYSEAL FLEX INTRO SHEATH 12FR 33CM: Type: IMPLANTABLE DEVICE | Status: FUNCTIONAL

## 2025-04-17 DEVICE — GLDWIRE ADVANTAGE .035X260 CM: Type: IMPLANTABLE DEVICE | Status: FUNCTIONAL

## 2025-04-17 DEVICE — INTRO GLIDESHEATH SLENDER FLEX STRT 21G 7FX10CM: Type: IMPLANTABLE DEVICE | Status: FUNCTIONAL

## 2025-04-17 DEVICE — EMERALD GUIDEWIRE 0.35: Type: IMPLANTABLE DEVICE | Status: FUNCTIONAL

## 2025-04-17 DEVICE — CATH DX PIG 145 INFIN 5FRX110CM: Type: IMPLANTABLE DEVICE | Status: FUNCTIONAL

## 2025-04-17 RX ORDER — SENNA 187 MG
2 TABLET ORAL AT BEDTIME
Refills: 0 | Status: DISCONTINUED | OUTPATIENT
Start: 2025-04-17 | End: 2025-04-18

## 2025-04-17 RX ORDER — FUROSEMIDE 10 MG/ML
1 INJECTION INTRAMUSCULAR; INTRAVENOUS
Refills: 0 | DISCHARGE

## 2025-04-17 RX ORDER — IRBESARTAN 75 MG/1
1 TABLET ORAL
Refills: 0 | DISCHARGE

## 2025-04-17 RX ORDER — HEPARIN SODIUM 1000 [USP'U]/ML
5000 INJECTION INTRAVENOUS; SUBCUTANEOUS EVERY 8 HOURS
Refills: 0 | Status: DISCONTINUED | OUTPATIENT
Start: 2025-04-17 | End: 2025-04-17

## 2025-04-17 RX ORDER — DABIGATRAN ETEXILATE MESYLATE 30 MG/1
1 PELLET ORAL
Refills: 0 | DISCHARGE

## 2025-04-17 RX ORDER — AZELASTINE HYDROCHLORIDE 137 UG/1
1 SPRAY, METERED NASAL
Refills: 0 | DISCHARGE

## 2025-04-17 RX ORDER — ASPIRIN 325 MG
162 TABLET ORAL ONCE
Refills: 0 | Status: COMPLETED | OUTPATIENT
Start: 2025-04-17 | End: 2025-04-17

## 2025-04-17 RX ORDER — HEPARIN SODIUM 1000 [USP'U]/ML
800 INJECTION INTRAVENOUS; SUBCUTANEOUS
Qty: 25000 | Refills: 0 | Status: DISCONTINUED | OUTPATIENT
Start: 2025-04-17 | End: 2025-04-18

## 2025-04-17 RX ORDER — FERROUS SULFATE 137(45) MG
325 TABLET, EXTENDED RELEASE ORAL DAILY
Refills: 0 | Status: DISCONTINUED | OUTPATIENT
Start: 2025-04-17 | End: 2025-04-18

## 2025-04-17 RX ORDER — MAGNESIUM SULFATE 500 MG/ML
1 SYRINGE (ML) INJECTION ONCE
Refills: 0 | Status: COMPLETED | OUTPATIENT
Start: 2025-04-17 | End: 2025-04-17

## 2025-04-17 RX ORDER — ATORVASTATIN CALCIUM 80 MG/1
40 TABLET, FILM COATED ORAL AT BEDTIME
Refills: 0 | Status: DISCONTINUED | OUTPATIENT
Start: 2025-04-17 | End: 2025-04-18

## 2025-04-17 RX ORDER — PROTAMINE SULFATE 10 MG/ML
25 INJECTION, SOLUTION INTRAVENOUS ONCE
Refills: 0 | Status: COMPLETED | OUTPATIENT
Start: 2025-04-17 | End: 2025-04-17

## 2025-04-17 RX ORDER — CEFAZOLIN SODIUM IN 0.9 % NACL 3 G/100 ML
2000 INTRAVENOUS SOLUTION, PIGGYBACK (ML) INTRAVENOUS EVERY 8 HOURS
Refills: 0 | Status: COMPLETED | OUTPATIENT
Start: 2025-04-17 | End: 2025-04-18

## 2025-04-17 RX ORDER — ACETAMINOPHEN 500 MG/5ML
650 LIQUID (ML) ORAL EVERY 6 HOURS
Refills: 0 | Status: DISCONTINUED | OUTPATIENT
Start: 2025-04-17 | End: 2025-04-18

## 2025-04-17 RX ADMIN — Medication 2 TABLET(S): at 21:56

## 2025-04-17 RX ADMIN — Medication 1 LOZENGE: at 21:55

## 2025-04-17 RX ADMIN — Medication 100 MILLIGRAM(S): at 20:58

## 2025-04-17 RX ADMIN — Medication 162 MILLIGRAM(S): at 10:24

## 2025-04-17 RX ADMIN — PROTAMINE SULFATE 315 MILLIGRAM(S): 10 INJECTION, SOLUTION INTRAVENOUS at 20:00

## 2025-04-17 RX ADMIN — ATORVASTATIN CALCIUM 40 MILLIGRAM(S): 80 TABLET, FILM COATED ORAL at 21:56

## 2025-04-17 RX ADMIN — Medication 100 GRAM(S): at 16:48

## 2025-04-17 NOTE — BRIEF OPERATIVE NOTE - NSICDXBRIEFPROCEDURE_GEN_ALL_CORE_FT
PROCEDURES:  Balloon valvuloplasty, aortic 17-Apr-2025 15:09:33 LHC, BAV, THOR occlusion Ericka Dalton

## 2025-04-17 NOTE — H&P ADULT - NSHPREVIEWOFSYSTEMS_GEN_ALL_CORE
Review of Systems  CONSTITUTIONAL:  Denies Fevers / chills, sweats, fatigue, weight loss, weight gain                                      NEURO:  Denies changes in sensation, seizures, syncope, confusion                                                                            EYES:  Denies Blurry vision, discharge, pain, loss of vision                                                                                    ENMT:  Denies Difficulty hearing, vertigo, dysphagia, epistaxis, recent dental work                                       CV:  Denies Chest pain, palpitations, AVILA, orthopnea                                                                                          RESPIRATORY:  Denies Wheezing, SOB, cough / sputum, hemoptysis                                                                GI:  Denies Nausea, vomiting, diarrhea, constipation, melena, difficulty swallowing                                               : Denies Hematuria, dysuria, urgency, incontinence                                                                                         MUSCULOSKELETAL:  Denies arthritis, joint swelling, muscle weakness                                                             SKIN/BREAST:  Denies rash, itching, hair loss, masses                                                                                            PSYCH:  Denies depression, anxiety, suicidal ideation                                                                                               HEME/LYMPH:  Denies bruises easily, enlarged lymph nodes, tender lymph nodes                                        ENDOCRINE:  Denies cold intolerance, heat intolerance, polydipsia

## 2025-04-17 NOTE — H&P ADULT - HISTORY OF PRESENT ILLNESS
89yo M with family history of CAD and PMHx of HTN, HLD, Atrial Fibrillation (on pradaxa), PPM ([facility] [place] followed by Dr. Galvez, last checked a few months ago), thoracic aortic aneurysm, history of Prostate CA, CKD (creatine 1.21), chronic diastolic heart failure with severe AS (PV 4.11 MG 43) who recently saw his cardiologist, Dr. Minaya complaining of worsening dyspnea for the last few months. In February 2025 he presented for possible left heart catheterization which was cancelled after JORDANA that day revealed thrombus in the  left atrial appendage and his eliquis was switched to pradaxa. He was followed as an outpatient by Dr. Garrison for his Severe AS. On 4/11/25 he presented to [facility] after noticing worsening SOB and orthopnea while laying flat on one occasion at night, advised to present to the ED for further evaluation. Noted to have a pleural effusion for which a pigtail was placed. Pt was discharged with plan to return to [facility] on 4/17 for a TAVR. On arrival to [facility] pt denies dizziness, vision changes, chest pain, palpitations, shortness of breath, cough, n/v/d, extremity swelling, calf tenderness.     Patient seen in same day holding area; Reports no changes to PMHx or medications since last seen by our team. Denies acute or current SOB, chest pain, palpitation, N/V/D, fever/chills, recent illness, or any other concerning symptoms.

## 2025-04-17 NOTE — H&P ADULT - NSICDXPASTMEDICALHX_GEN_ALL_CORE_FT
PAST MEDICAL HISTORY:  Aortic stenosis     Atrial fibrillation     Cardiac pacemaker     Carotid bruit     Common iliac aneurysm     Dyspnea on exertion     Gastroenteritis     HLD (hyperlipidemia)     HTN (hypertension)     Prostate cancer     PVD (peripheral vascular disease)     Stage 3 chronic kidney disease     Superficial vein thrombosis

## 2025-04-17 NOTE — H&P ADULT - ASSESSMENT
89yo M with family history of CAD and PMHx of HTN, HLD, Atrial Fibrillation (on pradaxa), PPM ([facility] [place] followed by Dr. Galvez, last checked a few months ago), thoracic aortic aneurysm, history of Prostate CA, CKD (creatine 1.21), chronic diastolic heart failure with severe AS (PV 4.11 MG 43) who recently saw his cardiologist, Dr. Minaya complaining of worsening dyspnea for the last few months. In February 2025 he presented for possible left heart catheterization which was cancelled after JORDANA that day revealed thrombus in the  left atrial appendage and his eliquis was switched to pradaxa. He was followed as an outpatient by Dr. Garrison for his Severe AS. On 4/11/25 he presented to [facility] after noticing worsening SOB and orthopnea while laying flat on one occasion at night, advised to present to the ED for further evaluation. Noted to have a pleural effusion for which a pigtail was placed. Pt was discharged with plan to return to [facility] on 4/17 for a TAVR.     Problem one: Severe AS  -plan for TAVR today  -consent in chart  -blood on hold       Admit under Dr. Garrison via same day surgery. Consent signed, placed on chart.  Risks/benefits reviewed, patient understands and agrees. T&S ordered and blood products placed on hold for OR.  To [address] [place] [address] icu post-op.

## 2025-04-17 NOTE — PRE-ANESTHESIA EVALUATION ADULT - NSANTHTIREDRD_ENT_A_CORE
Acute on chronic respiratory failure with hypoxia Acute on chronic respiratory failure with hypoxia Acute on chronic respiratory failure with hypoxia Acute on chronic respiratory failure with hypoxia Acute on chronic respiratory failure with hypoxia Acute on chronic respiratory failure with hypoxia Acute on chronic respiratory failure with hypoxia Acute on chronic respiratory failure with hypoxia Acute on chronic respiratory failure with hypoxia CVA (cerebrovascular accident) CVA (cerebrovascular accident) Acute on chronic respiratory failure with hypoxia CVA (cerebrovascular accident) CVA (cerebrovascular accident) No

## 2025-04-17 NOTE — BRIEF OPERATIVE NOTE - OPERATION/FINDINGS
R CFA: 12 F, perclosed x2  R CFV: 12F, vascade   R radial: 7F, TR band in place  L radial: 6F remains in place

## 2025-04-17 NOTE — PROGRESS NOTE ADULT - SUBJECTIVE AND OBJECTIVE BOX
Attending: Dr. Garrison  Procedure: LHC, BAV, THOR occlusion      Access:    R CFA: 12 F, perclosed x2  R CFV: 12F, vascade   R radial: 7F, TR band in place  L radial: 6F remains in place  TVP: none  Pre-existing rhythm issues:  none   Post-op rhythm issues:  none   TR Band: R radial TR band in place, L radial will NEED A TR band to removed the 6F sheath        VS:   Appearance:  well appearing   Neurologic:  AOx3  Cardiovascular: s1/s2, no murmur   Respiratory:  no accessory muscle useage  Gastrointestinal: +BS   Extremities: no calf tenderness  Groin sites:  right groin incisions healing well, TR band in place on right radial     Bed rest: 4 hours   Anti-platelet: will go back on pradaxa, follow up with structural team     TTE/EKG ordered: no     Dispo: 9L ICU care

## 2025-04-17 NOTE — H&P ADULT - NSHPPHYSICALEXAM_GEN_ALL_CORE
GEN: NAD, looks comfortable  Psych: Mood appropriate  Neuro: A&Ox3.  No focal deficits.  Moving all extremities.   HEENT: No obvious abnormalities  CV: S1S2, regular, +murmurs appreciated.  No carotid bruits.  No JVD  Lungs: Clear B/L.  No wheezing, rales or rhonchi  ABD: Soft, non-tender, non-distended.    EXT: Warm and well perfused.  No peripheral edema noted  Musculoskeletal: Moving all extremities with normal ROM, no joint swelling  PV: Pedal pulses palpable

## 2025-04-18 ENCOUNTER — TRANSCRIPTION ENCOUNTER (OUTPATIENT)
Age: 89
End: 2025-04-18

## 2025-04-18 ENCOUNTER — APPOINTMENT (OUTPATIENT)
Dept: CARDIOTHORACIC SURGERY | Facility: CLINIC | Age: 89
End: 2025-04-18

## 2025-04-18 VITALS — TEMPERATURE: 98 F

## 2025-04-18 DIAGNOSIS — Z88.8 ALLERGY STATUS TO OTHER DRUGS, MEDICAMENTS AND BIOLOGICAL SUBSTANCES: ICD-10-CM

## 2025-04-18 DIAGNOSIS — Z79.01 LONG TERM (CURRENT) USE OF ANTICOAGULANTS: ICD-10-CM

## 2025-04-18 DIAGNOSIS — E78.5 HYPERLIPIDEMIA, UNSPECIFIED: ICD-10-CM

## 2025-04-18 DIAGNOSIS — I71.20 THORACIC AORTIC ANEURYSM, WITHOUT RUPTURE, UNSPECIFIED: ICD-10-CM

## 2025-04-18 DIAGNOSIS — J98.11 ATELECTASIS: ICD-10-CM

## 2025-04-18 DIAGNOSIS — J91.8 PLEURAL EFFUSION IN OTHER CONDITIONS CLASSIFIED ELSEWHERE: ICD-10-CM

## 2025-04-18 DIAGNOSIS — I35.0 NONRHEUMATIC AORTIC (VALVE) STENOSIS: ICD-10-CM

## 2025-04-18 DIAGNOSIS — I48.91 UNSPECIFIED ATRIAL FIBRILLATION: ICD-10-CM

## 2025-04-18 DIAGNOSIS — Z95.0 PRESENCE OF CARDIAC PACEMAKER: ICD-10-CM

## 2025-04-18 DIAGNOSIS — I50.33 ACUTE ON CHRONIC DIASTOLIC (CONGESTIVE) HEART FAILURE: ICD-10-CM

## 2025-04-18 PROBLEM — R06.09 OTHER FORMS OF DYSPNEA: Chronic | Status: ACTIVE | Noted: 2025-04-16

## 2025-04-18 PROBLEM — K52.9 NONINFECTIVE GASTROENTERITIS AND COLITIS, UNSPECIFIED: Chronic | Status: ACTIVE | Noted: 2025-04-16

## 2025-04-18 PROBLEM — N18.30 CHRONIC KIDNEY DISEASE, STAGE 3 UNSPECIFIED: Chronic | Status: ACTIVE | Noted: 2025-04-16

## 2025-04-18 PROBLEM — A80.9 ACUTE POLIOMYELITIS, UNSPECIFIED: Chronic | Status: ACTIVE | Noted: 2025-04-17

## 2025-04-18 PROBLEM — C61 MALIGNANT NEOPLASM OF PROSTATE: Chronic | Status: ACTIVE | Noted: 2025-04-16

## 2025-04-18 LAB
ALBUMIN SERPL ELPH-MCNC: 3.4 G/DL — SIGNIFICANT CHANGE UP (ref 3.3–5)
ALP SERPL-CCNC: 106 U/L — SIGNIFICANT CHANGE UP (ref 40–120)
ALT FLD-CCNC: 17 U/L — SIGNIFICANT CHANGE UP (ref 10–45)
ANION GAP SERPL CALC-SCNC: 12 MMOL/L — SIGNIFICANT CHANGE UP (ref 5–17)
APTT BLD: 39.4 SEC — HIGH (ref 24.5–35.6)
APTT BLD: 62.8 SEC — HIGH (ref 24.5–35.6)
AST SERPL-CCNC: 26 U/L — SIGNIFICANT CHANGE UP (ref 10–40)
BASOPHILS # BLD AUTO: 0.02 K/UL — SIGNIFICANT CHANGE UP (ref 0–0.2)
BASOPHILS NFR BLD AUTO: 0.3 % — SIGNIFICANT CHANGE UP (ref 0–2)
BILIRUB SERPL-MCNC: 0.9 MG/DL — SIGNIFICANT CHANGE UP (ref 0.2–1.2)
BUN SERPL-MCNC: 23 MG/DL — SIGNIFICANT CHANGE UP (ref 7–23)
CALCIUM SERPL-MCNC: 8.8 MG/DL — SIGNIFICANT CHANGE UP (ref 8.4–10.5)
CHLORIDE SERPL-SCNC: 107 MMOL/L — SIGNIFICANT CHANGE UP (ref 96–108)
CO2 SERPL-SCNC: 21 MMOL/L — LOW (ref 22–31)
CREAT SERPL-MCNC: 1.23 MG/DL — SIGNIFICANT CHANGE UP (ref 0.5–1.3)
EGFR: 56 ML/MIN/1.73M2 — LOW
EGFR: 56 ML/MIN/1.73M2 — LOW
EOSINOPHIL # BLD AUTO: 0.04 K/UL — SIGNIFICANT CHANGE UP (ref 0–0.5)
EOSINOPHIL NFR BLD AUTO: 0.6 % — SIGNIFICANT CHANGE UP (ref 0–6)
GLUCOSE SERPL-MCNC: 157 MG/DL — HIGH (ref 70–99)
HCT VFR BLD CALC: 31.5 % — LOW (ref 39–50)
HGB BLD-MCNC: 10.3 G/DL — LOW (ref 13–17)
IMM GRANULOCYTES NFR BLD AUTO: 0.3 % — SIGNIFICANT CHANGE UP (ref 0–0.9)
INR BLD: 1.15 — SIGNIFICANT CHANGE UP (ref 0.85–1.16)
INR BLD: 1.16 — SIGNIFICANT CHANGE UP (ref 0.85–1.16)
LYMPHOCYTES # BLD AUTO: 0.91 K/UL — LOW (ref 1–3.3)
LYMPHOCYTES # BLD AUTO: 14.5 % — SIGNIFICANT CHANGE UP (ref 13–44)
MAGNESIUM SERPL-MCNC: 2 MG/DL — SIGNIFICANT CHANGE UP (ref 1.6–2.6)
MCHC RBC-ENTMCNC: 31.5 PG — SIGNIFICANT CHANGE UP (ref 27–34)
MCHC RBC-ENTMCNC: 32.7 G/DL — SIGNIFICANT CHANGE UP (ref 32–36)
MCV RBC AUTO: 96.3 FL — SIGNIFICANT CHANGE UP (ref 80–100)
MONOCYTES # BLD AUTO: 0.67 K/UL — SIGNIFICANT CHANGE UP (ref 0–0.9)
MONOCYTES NFR BLD AUTO: 10.7 % — SIGNIFICANT CHANGE UP (ref 2–14)
NEUTROPHILS # BLD AUTO: 4.62 K/UL — SIGNIFICANT CHANGE UP (ref 1.8–7.4)
NEUTROPHILS NFR BLD AUTO: 73.6 % — SIGNIFICANT CHANGE UP (ref 43–77)
NRBC BLD AUTO-RTO: 0 /100 WBCS — SIGNIFICANT CHANGE UP (ref 0–0)
PHOSPHATE SERPL-MCNC: 3.4 MG/DL — SIGNIFICANT CHANGE UP (ref 2.5–4.5)
PLATELET # BLD AUTO: 132 K/UL — LOW (ref 150–400)
POTASSIUM SERPL-MCNC: 4 MMOL/L — SIGNIFICANT CHANGE UP (ref 3.5–5.3)
POTASSIUM SERPL-SCNC: 4 MMOL/L — SIGNIFICANT CHANGE UP (ref 3.5–5.3)
PROT SERPL-MCNC: 7 G/DL — SIGNIFICANT CHANGE UP (ref 6–8.3)
PROTHROM AB SERPL-ACNC: 13.4 SEC — SIGNIFICANT CHANGE UP (ref 9.9–13.4)
PROTHROM AB SERPL-ACNC: 13.6 SEC — HIGH (ref 9.9–13.4)
RBC # BLD: 3.27 M/UL — LOW (ref 4.2–5.8)
RBC # FLD: 14.1 % — SIGNIFICANT CHANGE UP (ref 10.3–14.5)
SODIUM SERPL-SCNC: 140 MMOL/L — SIGNIFICANT CHANGE UP (ref 135–145)
WBC # BLD: 6.28 K/UL — SIGNIFICANT CHANGE UP (ref 3.8–10.5)
WBC # FLD AUTO: 6.28 K/UL — SIGNIFICANT CHANGE UP (ref 3.8–10.5)

## 2025-04-18 PROCEDURE — C1769: CPT

## 2025-04-18 PROCEDURE — 83605 ASSAY OF LACTIC ACID: CPT

## 2025-04-18 PROCEDURE — 93306 TTE W/DOPPLER COMPLETE: CPT

## 2025-04-18 PROCEDURE — 86923 COMPATIBILITY TEST ELECTRIC: CPT

## 2025-04-18 PROCEDURE — 93454 CORONARY ARTERY ANGIO S&I: CPT

## 2025-04-18 PROCEDURE — 85730 THROMBOPLASTIN TIME PARTIAL: CPT

## 2025-04-18 PROCEDURE — 36415 COLL VENOUS BLD VENIPUNCTURE: CPT

## 2025-04-18 PROCEDURE — 85025 COMPLETE CBC W/AUTO DIFF WBC: CPT

## 2025-04-18 PROCEDURE — C1893: CPT

## 2025-04-18 PROCEDURE — 84295 ASSAY OF SERUM SODIUM: CPT

## 2025-04-18 PROCEDURE — 71045 X-RAY EXAM CHEST 1 VIEW: CPT

## 2025-04-18 PROCEDURE — C1894: CPT

## 2025-04-18 PROCEDURE — 86900 BLOOD TYPING SEROLOGIC ABO: CPT

## 2025-04-18 PROCEDURE — 83735 ASSAY OF MAGNESIUM: CPT

## 2025-04-18 PROCEDURE — 85027 COMPLETE CBC AUTOMATED: CPT

## 2025-04-18 PROCEDURE — C1725: CPT

## 2025-04-18 PROCEDURE — 86850 RBC ANTIBODY SCREEN: CPT

## 2025-04-18 PROCEDURE — 80053 COMPREHEN METABOLIC PANEL: CPT

## 2025-04-18 PROCEDURE — 92986 REVISION OF AORTIC VALVE: CPT

## 2025-04-18 PROCEDURE — 33340 PERQ CLSR TCAT L ATR APNDGE: CPT

## 2025-04-18 PROCEDURE — 33370 TCAT PLMT&RMVL CEPD PERQ: CPT

## 2025-04-18 PROCEDURE — 82330 ASSAY OF CALCIUM: CPT

## 2025-04-18 PROCEDURE — 83880 ASSAY OF NATRIURETIC PEPTIDE: CPT

## 2025-04-18 PROCEDURE — 93005 ELECTROCARDIOGRAM TRACING: CPT

## 2025-04-18 PROCEDURE — C1887: CPT

## 2025-04-18 PROCEDURE — 82803 BLOOD GASES ANY COMBINATION: CPT

## 2025-04-18 PROCEDURE — C1889: CPT

## 2025-04-18 PROCEDURE — 85610 PROTHROMBIN TIME: CPT

## 2025-04-18 PROCEDURE — 71045 X-RAY EXAM CHEST 1 VIEW: CPT | Mod: 26

## 2025-04-18 PROCEDURE — C1884: CPT

## 2025-04-18 PROCEDURE — C1760: CPT

## 2025-04-18 PROCEDURE — 84100 ASSAY OF PHOSPHORUS: CPT

## 2025-04-18 PROCEDURE — 84132 ASSAY OF SERUM POTASSIUM: CPT

## 2025-04-18 PROCEDURE — 86901 BLOOD TYPING SEROLOGIC RH(D): CPT

## 2025-04-18 RX ORDER — APIXABAN 2.5 MG/1
1 TABLET, FILM COATED ORAL
Qty: 60 | Refills: 0
Start: 2025-04-18 | End: 2025-05-17

## 2025-04-18 RX ORDER — METOPROLOL SUCCINATE 50 MG/1
1 TABLET, EXTENDED RELEASE ORAL
Qty: 0 | Refills: 0 | DISCHARGE

## 2025-04-18 RX ORDER — APIXABAN 2.5 MG/1
5 TABLET, FILM COATED ORAL EVERY 12 HOURS
Refills: 0 | Status: DISCONTINUED | OUTPATIENT
Start: 2025-04-18 | End: 2025-04-18

## 2025-04-18 RX ORDER — DABIGATRAN ETEXILATE MESYLATE 30 MG/1
1 PELLET ORAL
Refills: 0 | DISCHARGE

## 2025-04-18 RX ADMIN — Medication 1 LOZENGE: at 05:55

## 2025-04-18 RX ADMIN — HEPARIN SODIUM 8 UNIT(S)/HR: 1000 INJECTION INTRAVENOUS; SUBCUTANEOUS at 01:10

## 2025-04-18 RX ADMIN — APIXABAN 5 MILLIGRAM(S): 2.5 TABLET, FILM COATED ORAL at 08:49

## 2025-04-18 RX ADMIN — Medication 100 MILLIGRAM(S): at 05:10

## 2025-04-18 RX ADMIN — Medication 1 APPLICATION(S): at 05:55

## 2025-04-18 NOTE — DISCHARGE NOTE PROVIDER - NSDCCPCAREPLAN_GEN_ALL_CORE_FT
PRINCIPAL DISCHARGE DIAGNOSIS  Diagnosis: Severe aortic stenosis  Assessment and Plan of Treatment:

## 2025-04-18 NOTE — DISCHARGE NOTE NURSING/CASE MANAGEMENT/SOCIAL WORK - PATIENT PORTAL LINK FT
You can access the FollowMyHealth Patient Portal offered by [facility] by registering at the following website: [email or web address]. By joining [employer]’s FollowMyHealth portal, you will also be able to view your health information using other applications (apps) compatible with our system.

## 2025-04-18 NOTE — DISCHARGE NOTE NURSING/CASE MANAGEMENT/SOCIAL WORK - FINANCIAL ASSISTANCE
[facility] provides services at a reduced cost to those who are determined to be eligible through [facility]’s financial assistance program. Information regarding [facility]’s financial assistance program can be found by going to [email or web address] or by calling [phone].

## 2025-04-18 NOTE — DISCHARGE NOTE PROVIDER - HOSPITAL COURSE
Patient discussed on morning rounds with Dr. Louise  Operation Date: s/p LHC, BAV, THOR occlusion on 4/17/25  Primary Surgeon/Attending MD: Dr. Garrison  Referring Physician: Dr. Tracy Minaya  _ _ _ _ _ _ _ _ _ _ _ _   HOSPITAL COURSE:   89 YO Male w/ PMHx of HTN, HLD, A-Fib (on Pradaxa), PPM ([facility] in 2018 followed by Dr. Galvez, last checked a few months ago), thoracic aortic aneurysm, prostate CA, CKD (creatine 1.21), chronic diastolic heart failure with severe AS (PV 4.11 MG 43) who recently saw his cardiologist, Dr. Minaya c/o worsening dyspnea for the last few months. In February 2025 he presented for possible left heart catheterization, which was cancelled after JORDANA that day revealed thrombus in the THOR and his eliquis was switched to pradaxa. He was followed as an outpatient by Dr. Garrison for his Severe AS. On 4/11/25 he presented to [facility] after noticing worsening SOB and orthopnea while laying flat on one occasion at night, advised to present to the ED for further evaluation. Noted to have a pleural effusion for which a pigtail was placed and he was discharged on hospital day #3. Pt returned to [facility] on 4/17 where he underwent LHC, BAV and THOR occlusion with Dr. Garrison. Intra-op noted to have sludge in THOR, so BAV done instead of TAVR. Patient recovered on 9LA as mini-ICU. Started on heparin gtt after TR band removed. TTE completed. POD1 Decision made to switch from Pradaxa to Eliquis, which was started in the AM. Patient ambulated and cleared for discharge home per Dr. Garrison.   _ _ _ _ _ _ _ _ _ _ _ _   DISCHARGE PHYSICAL EXAM:   GENERAL: NAD, lying in bed comfortably  HEAD:  Atraumatic, Normocephalic  EYES: EOMI, PERRLA, conjunctiva and sclera clear  ENT: Moist mucous membranes  NECK: Supple, No JVD  CHEST/LUNG: CTAB  HEART: RRR  ABDOMEN: Soft, Nontender, Nondistended. No hepatomegally  GROINS: R groin stable, soft no evidence of hematoma formation  EXTREMITIES:  2+ Peripheral Pulses, brisk capillary refill. No clubbing, cyanosis, or edema  NERVOUS SYSTEM:  Alert & Oriented X3, speech clear. No deficits   _ _ _ _ _ _ _ _ _ _ _ _   REMOVAL CHECKLIST:         [X ] Epicardial wires         [X ] Stitches/tie downs,   If no, why?          [X ] PICC/Midline,   If no, why?    _ _ _ _ _ _ _ _ _ _ _ _   MEDICATION DISCHARGE CHECKLIST       TAVR         [ ] Aspirin, [  ] Contraindicated, Reason: Eliquis        [ ] Plavix, [ ] Contraindicated, Reason: Eliquis        Anticoagulation         [X ] NOAC – Eliquis, [ ] Reason: A Fib        _ _ _ _ _ _ _ _ _ _ _   RELEVANT LABS/IMAGING:   < from: TTE Echo Complete w/o Contrast w/ Doppler (04.17.25 @ 15:31) >    CONCLUSIONS:     1. Left ventricular cavity is normal in size. Left ventricular systolic   function is normal with an ejection fraction visually estimated at 55 to   60 %.   2. Severe left ventricular hypertrophy.   3. Normal right ventricular cavity size and probably normal right   ventricular systolic function.   4. Left atrium is severely dilated.   5. The right atrium is dilated.   6. There is severe calcification of the aortic valve leaflets. Moderate   aortic stenosis.   7. The peak transaortic velocity is 3.26 m/s, peak transaortic gradient   is 42.5 mmHg and mean transaortic gradient is 23.0 mmHg with an   LVOT/aortic valve VTI ratio of 0.28. The effective orifice area is   estimated at 1.39 cm² by the continuity equation and indexed at 0.71   cm²/m².   8. Mild to moderate aortic regurgitation.   9. Estimated pulmonary artery systolic pressure is 42 mmHg.  10. No pericardial effusion seen.  11. Pt s/p BAV and Watchman      No prior TTE for comparison.  _ _ _ _ _ _ _ _ _ _ _ _   Over 35 minutes was spent with the patient reviewing the discharge material including medications, follow up appointments, recovery, concerning symptoms, and how to contact their health care providers if they have questions.   Patient discussed on morning rounds with Dr. Louise  Operation Date: s/p LHC, BAV, THOR occlusion on 4/17/25  Primary Surgeon/Attending MD: Dr. Garrison  Referring Physician: Dr. Tracy Minaya  _ _ _ _ _ _ _ _ _ _ _ _   HOSPITAL COURSE:   88 YO Male w/ PMHx of HTN, HLD, A-Fib (on Pradaxa), PPM ([facility] in 2018 followed by Dr. Galvez, last checked a few months ago), thoracic aortic aneurysm, prostate CA, CKD (creatine 1.21), chronic diastolic heart failure with severe AS (PV 4.11 MG 43) who recently saw his cardiologist, Dr. Minaya c/o worsening dyspnea for the last few months. In February 2025 he presented for possible left heart catheterization, which was cancelled after JORDANA that day revealed thrombus in the THOR and his eliquis was switched to pradaxa. He was followed as an outpatient by Dr. Garrison for his Severe AS. On 4/11/25 he presented to [facility] after noticing worsening SOB and orthopnea while laying flat on one occasion at night, advised to present to the ED for further evaluation. Noted to have a pleural effusion for which a pigtail was placed and he was discharged on hospital day #3. Pt returned to [facility] on 4/17 where he underwent LHC, BAV and THOR occlusion with Dr. Garrison. Intra-op noted to have sludge in THOR, so BAV done instead of TAVR. Patient recovered on 9LA as mini-ICU. Started on heparin gtt after TR band removed. TTE completed. POD1 Decision made to switch from Pradaxa to Eliquis, which was started in the AM. Patient ambulated and cleared for discharge home per Dr. Garrison.   _ _ _ _ _ _ _ _ _ _ _ _   DISCHARGE PHYSICAL EXAM:   GENERAL: NAD, lying in bed comfortably  HEAD:  Atraumatic, Normocephalic  EYES: EOMI, PERRLA, conjunctiva and sclera clear  ENT: Moist mucous membranes  NECK: Supple, No JVD  CHEST/LUNG: CTAB  HEART: RRR  ABDOMEN: Soft, Nontender, Nondistended. No hepatomegally  GROINS: R groin dressing removed, stable, soft w/ no evidence of hematoma formation  EXTREMITIES:  2+ Peripheral Pulses, brisk capillary refill. No clubbing, cyanosis, or edema  NERVOUS SYSTEM:  Alert & Oriented X3, speech clear. No deficits   _ _ _ _ _ _ _ _ _ _ _ _   REMOVAL CHECKLIST:         [X ] Epicardial wires         [X ] Stitches/tie downs,   If no, why?          [X ] PICC/Midline,   If no, why?    _ _ _ _ _ _ _ _ _ _ _ _   MEDICATION DISCHARGE CHECKLIST       TAVR         [ ] Aspirin, [  ] Contraindicated, Reason: Eliquis        [ ] Plavix, [ ] Contraindicated, Reason: Eliquis        Anticoagulation         [X ] NOAC – Eliquis, [ ] Reason: A Fib        _ _ _ _ _ _ _ _ _ _ _   RELEVANT LABS/IMAGING:   < from: TTE Echo Complete w/o Contrast w/ Doppler (04.17.25 @ 15:31) >    CONCLUSIONS:     1. Left ventricular cavity is normal in size. Left ventricular systolic   function is normal with an ejection fraction visually estimated at 55 to   60 %.   2. Severe left ventricular hypertrophy.   3. Normal right ventricular cavity size and probably normal right   ventricular systolic function.   4. Left atrium is severely dilated.   5. The right atrium is dilated.   6. There is severe calcification of the aortic valve leaflets. Moderate   aortic stenosis.   7. The peak transaortic velocity is 3.26 m/s, peak transaortic gradient   is 42.5 mmHg and mean transaortic gradient is 23.0 mmHg with an   LVOT/aortic valve VTI ratio of 0.28. The effective orifice area is   estimated at 1.39 cm² by the continuity equation and indexed at 0.71   cm²/m².   8. Mild to moderate aortic regurgitation.   9. Estimated pulmonary artery systolic pressure is 42 mmHg.  10. No pericardial effusion seen.  11. Pt s/p BAV and Watchman      No prior TTE for comparison.  _ _ _ _ _ _ _ _ _ _ _ _   Over 35 minutes was spent with the patient reviewing the discharge material including medications, follow up appointments, recovery, concerning symptoms, and how to contact their health care providers if they have questions.

## 2025-04-18 NOTE — DISCHARGE NOTE PROVIDER - NSDCMRMEDTOKEN_GEN_ALL_CORE_FT
atorvastatin 40 mg oral tablet: 1 tab(s) orally once a day  azelastine 137 mcg/inh (0.1%) nasal spray: 1 spray(s) in each nostril 2 times a day  enoxaparin 80 mg/0.8 mL injectable solution: 70 milligram(s) subcutaneously every 12 hours Last dose Wednesday (4/16/25) morning  ferrous sulfate 324 mg (65 mg elemental iron) oral tablet: orally every 48 hours  irbesartan 75 mg oral tablet: 1 tab(s) orally once a day  Lasix 20 mg oral tablet: 1 tab(s) orally every other day  metoprolol succinate 50 mg oral capsule, extended release: 1 cap(s) orally once a day  Pradaxa 150 mg oral capsule: 1 cap(s) orally 2 times a day   atorvastatin 40 mg oral tablet: 1 tab(s) orally once a day  azelastine 137 mcg/inh (0.1%) nasal spray: 1 spray(s) in each nostril 2 times a day  Eliquis 5 mg oral tablet: 1 tab(s) orally every 12 hours  ferrous sulfate 324 mg (65 mg elemental iron) oral tablet: orally every 48 hours  irbesartan 75 mg oral tablet: 1 tab(s) orally once a day  Lasix 20 mg oral tablet: 1 tab(s) orally every other day  Protonix 40 mg oral delayed release tablet: 1 tab(s) orally once a day   atorvastatin 40 mg oral tablet: 1 tab(s) orally once a day  azelastine 137 mcg/inh (0.1%) nasal spray: 1 spray(s) in each nostril 2 times a day  Eliquis 5 mg oral tablet: 1 tab(s) orally every 12 hours  ferrous sulfate 324 mg (65 mg elemental iron) oral tablet: orally every 48 hours  irbesartan 75 mg oral tablet: 1 tab(s) orally once a day  Lasix 20 mg oral tablet: 1 tab(s) orally every other day  metoprolol succinate 50 mg oral capsule, extended release: 1 cap(s) orally once a day  Protonix 40 mg oral delayed release tablet: 1 tab(s) orally once a day

## 2025-04-18 NOTE — DISCHARGE NOTE PROVIDER - CARE PROVIDER_API CALL
Anthony Garrison  Interventional Cardiology  [address]  [place] [address]  Phone: [phone]  Fax: [phone]  Follow Up Time: 1 week    Tracy Minaya  Cardiovascular Disease  [address], [address]  [place] [address]  Phone: [phone]  Fax: [phone]  Follow Up Time: 2 weeks

## 2025-04-18 NOTE — DISCHARGE NOTE PROVIDER - NSDCFUSCHEDAPPT_GEN_ALL_CORE_FT
[facility]  HEARTVASC [address]  Scheduled Appointment: 04/23/2025    Tracy Minaya  [facility]  HEARTVASC [address]  Scheduled Appointment: 04/23/2025    Victoriano Puente  [facility]  INTMED [address]  Scheduled Appointment: 06/17/2025

## 2025-04-18 NOTE — DISCHARGE NOTE PROVIDER - PROVIDER TOKENS
PROVIDER:[TOKEN:[[id number]],FOLLOWUP:[1 week]],PROVIDER:[TOKEN:[[id number]],FOLLOWUP:[2 weeks]]

## 2025-04-18 NOTE — DISCHARGE NOTE PROVIDER - NSDCCPTREATMENT_GEN_ALL_CORE_FT
PRINCIPAL PROCEDURE  Procedure: Balloon valvuloplasty, aortic  Findings and Treatment: LHC, BAV, THOR occlusion

## 2025-04-21 ENCOUNTER — NON-APPOINTMENT (OUTPATIENT)
Age: 89
End: 2025-04-21

## 2025-04-23 ENCOUNTER — NON-APPOINTMENT (OUTPATIENT)
Age: 89
End: 2025-04-23

## 2025-04-23 ENCOUNTER — APPOINTMENT (OUTPATIENT)
Dept: HEART AND VASCULAR | Facility: CLINIC | Age: 89
End: 2025-04-23

## 2025-04-23 ENCOUNTER — APPOINTMENT (OUTPATIENT)
Dept: HEART AND VASCULAR | Facility: CLINIC | Age: 89
End: 2025-04-23
Payer: MEDICARE

## 2025-04-23 VITALS
DIASTOLIC BLOOD PRESSURE: 62 MMHG | TEMPERATURE: 97 F | BODY MASS INDEX: 21.47 KG/M2 | SYSTOLIC BLOOD PRESSURE: 106 MMHG | HEIGHT: 73 IN | WEIGHT: 162 LBS | HEART RATE: 63 BPM | OXYGEN SATURATION: 97 %

## 2025-04-23 DIAGNOSIS — Z95.0 PRESENCE OF CARDIAC PACEMAKER: ICD-10-CM

## 2025-04-23 DIAGNOSIS — I35.0 NONRHEUMATIC AORTIC (VALVE) STENOSIS: ICD-10-CM

## 2025-04-23 DIAGNOSIS — I48.91 UNSPECIFIED ATRIAL FIBRILLATION: ICD-10-CM

## 2025-04-23 PROBLEM — I82.890 ACUTE EMBOLISM AND THROMBOSIS OF OTHER SPECIFIED VEINS: Chronic | Status: ACTIVE | Noted: 2025-04-16

## 2025-04-23 PROBLEM — Z95.818 PRESENCE OF WATCHMAN LEFT ATRIAL APPENDAGE CLOSURE DEVICE: Status: ACTIVE | Noted: 2025-04-23

## 2025-04-23 PROBLEM — I72.3 ANEURYSM OF ILIAC ARTERY: Chronic | Status: ACTIVE | Noted: 2025-04-16

## 2025-04-23 PROCEDURE — 93000 ELECTROCARDIOGRAM COMPLETE: CPT

## 2025-04-23 PROCEDURE — 99214 OFFICE O/P EST MOD 30 MIN: CPT | Mod: 25

## 2025-04-23 RX ORDER — APIXABAN 5 MG/1
5 TABLET, FILM COATED ORAL
Qty: 180 | Refills: 0 | Status: ACTIVE | COMMUNITY

## 2025-04-25 ENCOUNTER — APPOINTMENT (OUTPATIENT)
Dept: CARDIOTHORACIC SURGERY | Facility: CLINIC | Age: 89
End: 2025-04-25
Payer: MEDICARE

## 2025-04-25 DIAGNOSIS — Z95.818 PRESENCE OF OTHER CARDIAC IMPLANTS AND GRAFTS: ICD-10-CM

## 2025-04-25 PROCEDURE — 99213 OFFICE O/P EST LOW 20 MIN: CPT | Mod: 2W

## 2025-04-28 DIAGNOSIS — Z79.01 LONG TERM (CURRENT) USE OF ANTICOAGULANTS: ICD-10-CM

## 2025-04-28 DIAGNOSIS — I48.91 UNSPECIFIED ATRIAL FIBRILLATION: ICD-10-CM

## 2025-04-28 DIAGNOSIS — I71.20 THORACIC AORTIC ANEURYSM, WITHOUT RUPTURE, UNSPECIFIED: ICD-10-CM

## 2025-04-28 DIAGNOSIS — I51.3 INTRACARDIAC THROMBOSIS, NOT ELSEWHERE CLASSIFIED: ICD-10-CM

## 2025-04-28 DIAGNOSIS — N18.31 CHRONIC KIDNEY DISEASE, STAGE 3A: ICD-10-CM

## 2025-04-28 DIAGNOSIS — I50.32 CHRONIC DIASTOLIC (CONGESTIVE) HEART FAILURE: ICD-10-CM

## 2025-04-28 DIAGNOSIS — Z00.6 ENCOUNTER FOR EXAMINATION FOR NORMAL COMPARISON AND CONTROL IN CLINICAL RESEARCH PROGRAM: ICD-10-CM

## 2025-04-28 DIAGNOSIS — Z95.0 PRESENCE OF CARDIAC PACEMAKER: ICD-10-CM

## 2025-04-28 DIAGNOSIS — Z88.8 ALLERGY STATUS TO OTHER DRUGS, MEDICAMENTS AND BIOLOGICAL SUBSTANCES: ICD-10-CM

## 2025-04-28 DIAGNOSIS — I35.0 NONRHEUMATIC AORTIC (VALVE) STENOSIS: ICD-10-CM

## 2025-04-28 DIAGNOSIS — Z86.718 PERSONAL HISTORY OF OTHER VENOUS THROMBOSIS AND EMBOLISM: ICD-10-CM

## 2025-04-28 DIAGNOSIS — Z86.12 PERSONAL HISTORY OF POLIOMYELITIS: ICD-10-CM

## 2025-04-28 DIAGNOSIS — E78.00 PURE HYPERCHOLESTEROLEMIA, UNSPECIFIED: ICD-10-CM

## 2025-05-19 ENCOUNTER — OUTPATIENT (OUTPATIENT)
Dept: OUTPATIENT SERVICES | Facility: HOSPITAL | Age: 89
LOS: 1 days | End: 2025-05-19
Payer: MEDICARE

## 2025-05-19 ENCOUNTER — RESULT REVIEW (OUTPATIENT)
Age: 89
End: 2025-05-19

## 2025-05-19 ENCOUNTER — RX RENEWAL (OUTPATIENT)
Age: 89
End: 2025-05-19

## 2025-05-19 DIAGNOSIS — Z95.0 PRESENCE OF CARDIAC PACEMAKER: Chronic | ICD-10-CM

## 2025-05-19 DIAGNOSIS — Z98.890 OTHER SPECIFIED POSTPROCEDURAL STATES: Chronic | ICD-10-CM

## 2025-05-19 DIAGNOSIS — I35.0 NONRHEUMATIC AORTIC (VALVE) STENOSIS: ICD-10-CM

## 2025-05-19 PROCEDURE — 93306 TTE W/DOPPLER COMPLETE: CPT

## 2025-05-19 PROCEDURE — 93010 ELECTROCARDIOGRAM REPORT: CPT

## 2025-05-19 PROCEDURE — 93306 TTE W/DOPPLER COMPLETE: CPT | Mod: 26

## 2025-05-19 PROCEDURE — 93005 ELECTROCARDIOGRAM TRACING: CPT

## 2025-05-28 ENCOUNTER — APPOINTMENT (OUTPATIENT)
Dept: HEART AND VASCULAR | Facility: CLINIC | Age: 89
End: 2025-05-28

## 2025-06-02 ENCOUNTER — APPOINTMENT (OUTPATIENT)
Dept: CARDIOTHORACIC SURGERY | Facility: CLINIC | Age: 89
End: 2025-06-02
Payer: MEDICARE

## 2025-06-02 ENCOUNTER — OUTPATIENT (OUTPATIENT)
Dept: OUTPATIENT SERVICES | Facility: HOSPITAL | Age: 89
LOS: 1 days | End: 2025-06-02
Payer: MEDICARE

## 2025-06-02 VITALS
TEMPERATURE: 96.9 F | WEIGHT: 160 LBS | DIASTOLIC BLOOD PRESSURE: 66 MMHG | OXYGEN SATURATION: 99 % | BODY MASS INDEX: 21.2 KG/M2 | SYSTOLIC BLOOD PRESSURE: 121 MMHG | HEIGHT: 73 IN | HEART RATE: 90 BPM

## 2025-06-02 DIAGNOSIS — Z98.890 OTHER SPECIFIED POSTPROCEDURAL STATES: Chronic | ICD-10-CM

## 2025-06-02 DIAGNOSIS — Z01.818 ENCOUNTER FOR OTHER PREPROCEDURAL EXAMINATION: ICD-10-CM

## 2025-06-02 DIAGNOSIS — Z95.0 PRESENCE OF CARDIAC PACEMAKER: Chronic | ICD-10-CM

## 2025-06-02 PROCEDURE — 99213 OFFICE O/P EST LOW 20 MIN: CPT | Mod: 24

## 2025-06-02 PROCEDURE — 85730 THROMBOPLASTIN TIME PARTIAL: CPT

## 2025-06-02 PROCEDURE — 36415 COLL VENOUS BLD VENIPUNCTURE: CPT

## 2025-06-02 PROCEDURE — 85025 COMPLETE CBC W/AUTO DIFF WBC: CPT

## 2025-06-02 PROCEDURE — 86900 BLOOD TYPING SEROLOGIC ABO: CPT

## 2025-06-02 PROCEDURE — 86901 BLOOD TYPING SEROLOGIC RH(D): CPT

## 2025-06-02 PROCEDURE — 80053 COMPREHEN METABOLIC PANEL: CPT

## 2025-06-02 PROCEDURE — 86850 RBC ANTIBODY SCREEN: CPT

## 2025-06-02 PROCEDURE — 85610 PROTHROMBIN TIME: CPT

## 2025-06-13 ENCOUNTER — NON-APPOINTMENT (OUTPATIENT)
Age: 89
End: 2025-06-13

## 2025-06-13 RX ORDER — ENOXAPARIN SODIUM 100 MG/ML
100 INJECTION, SOLUTION SUBCUTANEOUS DAILY
Qty: 2 | Refills: 0 | Status: ACTIVE | COMMUNITY
Start: 2025-06-13 | End: 1900-01-01

## 2025-06-16 VITALS
OXYGEN SATURATION: 99 % | WEIGHT: 160.06 LBS | RESPIRATION RATE: 18 BRPM | DIASTOLIC BLOOD PRESSURE: 63 MMHG | HEIGHT: 73 IN | HEART RATE: 60 BPM | SYSTOLIC BLOOD PRESSURE: 126 MMHG | TEMPERATURE: 97 F

## 2025-06-17 ENCOUNTER — APPOINTMENT (OUTPATIENT)
Dept: CARDIOTHORACIC SURGERY | Facility: HOSPITAL | Age: 89
End: 2025-06-17

## 2025-06-17 ENCOUNTER — RESULT REVIEW (OUTPATIENT)
Age: 89
End: 2025-06-17

## 2025-06-17 ENCOUNTER — INPATIENT (INPATIENT)
Facility: HOSPITAL | Age: 89
LOS: 0 days | Discharge: HOME | End: 2025-06-18
Attending: THORACIC SURGERY (CARDIOTHORACIC VASCULAR SURGERY) | Admitting: THORACIC SURGERY (CARDIOTHORACIC VASCULAR SURGERY)
Payer: MEDICARE

## 2025-06-17 ENCOUNTER — TRANSCRIPTION ENCOUNTER (OUTPATIENT)
Age: 89
End: 2025-06-17

## 2025-06-17 DIAGNOSIS — Z98.890 OTHER SPECIFIED POSTPROCEDURAL STATES: Chronic | ICD-10-CM

## 2025-06-17 DIAGNOSIS — Z95.0 PRESENCE OF CARDIAC PACEMAKER: Chronic | ICD-10-CM

## 2025-06-17 LAB
ALBUMIN SERPL ELPH-MCNC: 3.6 G/DL — SIGNIFICANT CHANGE UP (ref 3.3–5)
ALBUMIN SERPL ELPH-MCNC: 4 G/DL — SIGNIFICANT CHANGE UP (ref 3.3–5)
ALP SERPL-CCNC: 174 U/L — HIGH (ref 40–120)
ALP SERPL-CCNC: 190 U/L — HIGH (ref 40–120)
ALT FLD-CCNC: 20 U/L — SIGNIFICANT CHANGE UP (ref 10–45)
ALT FLD-CCNC: 25 U/L — SIGNIFICANT CHANGE UP (ref 10–45)
ANION GAP SERPL CALC-SCNC: 11 MMOL/L — SIGNIFICANT CHANGE UP (ref 5–17)
ANION GAP SERPL CALC-SCNC: 7 MMOL/L — SIGNIFICANT CHANGE UP (ref 5–17)
APTT BLD: 33.9 SEC — SIGNIFICANT CHANGE UP (ref 26.1–36.8)
APTT BLD: 37.7 SEC — HIGH (ref 26.1–36.8)
AST SERPL-CCNC: 22 U/L — SIGNIFICANT CHANGE UP (ref 10–40)
AST SERPL-CCNC: 28 U/L — SIGNIFICANT CHANGE UP (ref 10–40)
BASOPHILS # BLD AUTO: 0.02 K/UL — SIGNIFICANT CHANGE UP (ref 0–0.2)
BASOPHILS NFR BLD AUTO: 0.3 % — SIGNIFICANT CHANGE UP (ref 0–2)
BILIRUB SERPL-MCNC: 0.8 MG/DL — SIGNIFICANT CHANGE UP (ref 0.2–1.2)
BILIRUB SERPL-MCNC: 0.9 MG/DL — SIGNIFICANT CHANGE UP (ref 0.2–1.2)
BLD GP AB SCN SERPL QL: NEGATIVE — SIGNIFICANT CHANGE UP
BUN SERPL-MCNC: 20 MG/DL — SIGNIFICANT CHANGE UP (ref 7–23)
BUN SERPL-MCNC: 21 MG/DL — SIGNIFICANT CHANGE UP (ref 7–23)
CALCIUM SERPL-MCNC: 8.9 MG/DL — SIGNIFICANT CHANGE UP (ref 8.4–10.5)
CALCIUM SERPL-MCNC: 9.8 MG/DL — SIGNIFICANT CHANGE UP (ref 8.4–10.5)
CHLORIDE SERPL-SCNC: 107 MMOL/L — SIGNIFICANT CHANGE UP (ref 96–108)
CHLORIDE SERPL-SCNC: 108 MMOL/L — SIGNIFICANT CHANGE UP (ref 96–108)
CO2 SERPL-SCNC: 19 MMOL/L — LOW (ref 22–31)
CO2 SERPL-SCNC: 24 MMOL/L — SIGNIFICANT CHANGE UP (ref 22–31)
CREAT SERPL-MCNC: 1.06 MG/DL — SIGNIFICANT CHANGE UP (ref 0.5–1.3)
CREAT SERPL-MCNC: 1.2 MG/DL — SIGNIFICANT CHANGE UP (ref 0.5–1.3)
EGFR: 58 ML/MIN/1.73M2 — LOW
EGFR: 58 ML/MIN/1.73M2 — LOW
EGFR: 67 ML/MIN/1.73M2 — SIGNIFICANT CHANGE UP
EGFR: 67 ML/MIN/1.73M2 — SIGNIFICANT CHANGE UP
EOSINOPHIL # BLD AUTO: 0.05 K/UL — SIGNIFICANT CHANGE UP (ref 0–0.5)
EOSINOPHIL NFR BLD AUTO: 0.8 % — SIGNIFICANT CHANGE UP (ref 0–6)
GAS PNL BLDA: SIGNIFICANT CHANGE UP
GLUCOSE SERPL-MCNC: 100 MG/DL — HIGH (ref 70–99)
GLUCOSE SERPL-MCNC: 116 MG/DL — HIGH (ref 70–99)
HCT VFR BLD CALC: 31.4 % — LOW (ref 39–50)
HCT VFR BLD CALC: 34.9 % — LOW (ref 39–50)
HGB BLD-MCNC: 10.6 G/DL — LOW (ref 13–17)
HGB BLD-MCNC: 11.3 G/DL — LOW (ref 13–17)
IMM GRANULOCYTES # BLD AUTO: 0.02 K/UL — SIGNIFICANT CHANGE UP (ref 0–0.07)
IMM GRANULOCYTES NFR BLD AUTO: 0.3 % — SIGNIFICANT CHANGE UP (ref 0–0.9)
INR BLD: 1.1 — SIGNIFICANT CHANGE UP (ref 0.85–1.16)
INR BLD: 1.22 — HIGH (ref 0.85–1.16)
LYMPHOCYTES # BLD AUTO: 0.58 K/UL — LOW (ref 1–3.3)
LYMPHOCYTES NFR BLD AUTO: 9.8 % — LOW (ref 13–44)
MAGNESIUM SERPL-MCNC: 1.6 MG/DL — SIGNIFICANT CHANGE UP (ref 1.6–2.6)
MCHC RBC-ENTMCNC: 30.9 PG — SIGNIFICANT CHANGE UP (ref 27–34)
MCHC RBC-ENTMCNC: 31.9 PG — SIGNIFICANT CHANGE UP (ref 27–34)
MCHC RBC-ENTMCNC: 32.4 G/DL — SIGNIFICANT CHANGE UP (ref 32–36)
MCHC RBC-ENTMCNC: 33.8 G/DL — SIGNIFICANT CHANGE UP (ref 32–36)
MCV RBC AUTO: 94.6 FL — SIGNIFICANT CHANGE UP (ref 80–100)
MCV RBC AUTO: 95.4 FL — SIGNIFICANT CHANGE UP (ref 80–100)
MONOCYTES # BLD AUTO: 0.25 K/UL — SIGNIFICANT CHANGE UP (ref 0–0.9)
MONOCYTES NFR BLD AUTO: 4.2 % — SIGNIFICANT CHANGE UP (ref 2–14)
NEUTROPHILS # BLD AUTO: 4.99 K/UL — SIGNIFICANT CHANGE UP (ref 1.8–7.4)
NEUTROPHILS NFR BLD AUTO: 84.6 % — HIGH (ref 43–77)
NRBC # BLD AUTO: 0 K/UL — SIGNIFICANT CHANGE UP (ref 0–0)
NRBC # FLD: 0 K/UL — SIGNIFICANT CHANGE UP (ref 0–0)
NRBC BLD AUTO-RTO: 0 /100 WBCS — SIGNIFICANT CHANGE UP (ref 0–0)
NRBC BLD AUTO-RTO: 0 /100 WBCS — SIGNIFICANT CHANGE UP (ref 0–0)
NT-PROBNP SERPL-SCNC: 3980 PG/ML — HIGH (ref 0–300)
PHOSPHATE SERPL-MCNC: 3.2 MG/DL — SIGNIFICANT CHANGE UP (ref 2.5–4.5)
PLATELET # BLD AUTO: 120 K/UL — LOW (ref 150–400)
PLATELET # BLD AUTO: 139 K/UL — LOW (ref 150–400)
PMV BLD: 9.2 FL — SIGNIFICANT CHANGE UP (ref 7–13)
POTASSIUM SERPL-MCNC: 4.2 MMOL/L — SIGNIFICANT CHANGE UP (ref 3.5–5.3)
POTASSIUM SERPL-MCNC: 4.3 MMOL/L — SIGNIFICANT CHANGE UP (ref 3.5–5.3)
POTASSIUM SERPL-SCNC: 4.2 MMOL/L — SIGNIFICANT CHANGE UP (ref 3.5–5.3)
POTASSIUM SERPL-SCNC: 4.3 MMOL/L — SIGNIFICANT CHANGE UP (ref 3.5–5.3)
PROT SERPL-MCNC: 7.4 G/DL — SIGNIFICANT CHANGE UP (ref 6–8.3)
PROT SERPL-MCNC: 8 G/DL — SIGNIFICANT CHANGE UP (ref 6–8.3)
PROTHROM AB SERPL-ACNC: 12.8 SEC — SIGNIFICANT CHANGE UP (ref 9.9–13.4)
PROTHROM AB SERPL-ACNC: 14.2 SEC — HIGH (ref 9.9–13.4)
RBC # BLD: 3.32 M/UL — LOW (ref 4.2–5.8)
RBC # BLD: 3.66 M/UL — LOW (ref 4.2–5.8)
RBC # FLD: 13.5 % — SIGNIFICANT CHANGE UP (ref 10.3–14.5)
RBC # FLD: 13.6 % — SIGNIFICANT CHANGE UP (ref 10.3–14.5)
RH IG SCN BLD-IMP: POSITIVE — SIGNIFICANT CHANGE UP
SODIUM SERPL-SCNC: 137 MMOL/L — SIGNIFICANT CHANGE UP (ref 135–145)
SODIUM SERPL-SCNC: 139 MMOL/L — SIGNIFICANT CHANGE UP (ref 135–145)
WBC # BLD: 5.05 K/UL — SIGNIFICANT CHANGE UP (ref 3.8–10.5)
WBC # BLD: 5.91 K/UL — SIGNIFICANT CHANGE UP (ref 3.8–10.5)
WBC # FLD AUTO: 5.05 K/UL — SIGNIFICANT CHANGE UP (ref 3.8–10.5)
WBC # FLD AUTO: 5.91 K/UL — SIGNIFICANT CHANGE UP (ref 3.8–10.5)

## 2025-06-17 PROCEDURE — 33361 REPLACE AORTIC VALVE PERQ: CPT | Mod: 62,Q0

## 2025-06-17 PROCEDURE — 76376 3D RENDER W/INTRP POSTPROCES: CPT | Mod: 26,XU

## 2025-06-17 PROCEDURE — 93355 ECHO TRANSESOPHAGEAL (TEE): CPT

## 2025-06-17 PROCEDURE — 93010 ELECTROCARDIOGRAM REPORT: CPT

## 2025-06-17 PROCEDURE — 93452 LEFT HRT CATH W/VENTRCLGRPHY: CPT | Mod: 26,80,59

## 2025-06-17 PROCEDURE — 93452 LEFT HRT CATH W/VENTRCLGRPHY: CPT | Mod: 26,59

## 2025-06-17 PROCEDURE — 71045 X-RAY EXAM CHEST 1 VIEW: CPT | Mod: 26

## 2025-06-17 DEVICE — SUT PERCLOSE PROGLIDE 6FR: Type: IMPLANTABLE DEVICE | Status: FUNCTIONAL

## 2025-06-17 DEVICE — GWIRE JTIP 1.5MM .035X180CM: Type: IMPLANTABLE DEVICE | Status: FUNCTIONAL

## 2025-06-17 DEVICE — CATH DX AL1 INFIN 5FRX100CM: Type: IMPLANTABLE DEVICE | Status: FUNCTIONAL

## 2025-06-17 DEVICE — WIRE GD SAFARI2 275CM XSML CRV: Type: IMPLANTABLE DEVICE | Status: FUNCTIONAL

## 2025-06-17 DEVICE — GUIDEWIRE STANDARD STRAIGHT .035" X 180CM: Type: IMPLANTABLE DEVICE | Status: FUNCTIONAL

## 2025-06-17 DEVICE — GUIDEWIRE LUNDERQUIST EXTRA-STIFF DOUBLE CURVED .035" X 260CM: Type: IMPLANTABLE DEVICE | Status: FUNCTIONAL

## 2025-06-17 DEVICE — IMPLANTABLE DEVICE: Type: IMPLANTABLE DEVICE | Status: FUNCTIONAL

## 2025-06-17 DEVICE — SYS VASCADE MVP VASCULAR CLOSURE 6-12F: Type: IMPLANTABLE DEVICE | Status: FUNCTIONAL

## 2025-06-17 DEVICE — SHEATH INTRODUCER TERUMO PINNACLE CORONARY 8FR X 10CM X 0.038" MINI WIRE: Type: IMPLANTABLE DEVICE | Status: FUNCTIONAL

## 2025-06-17 DEVICE — INTRO MICROPUNC 4FRX10CM SS: Type: IMPLANTABLE DEVICE | Status: FUNCTIONAL

## 2025-06-17 DEVICE — ANGIOSEAL VASC CLOS VIP 8FR: Type: IMPLANTABLE DEVICE | Status: FUNCTIONAL

## 2025-06-17 DEVICE — KIT PACE ELCTR BIPOLAR 5FR: Type: IMPLANTABLE DEVICE | Status: FUNCTIONAL

## 2025-06-17 DEVICE — GWIRE GUID  0.035INX150CM: Type: IMPLANTABLE DEVICE | Status: FUNCTIONAL

## 2025-06-17 DEVICE — VLV TRANS CATH W/COMM SYS SAPIEN 3 ULTRA 29MM: Type: IMPLANTABLE DEVICE | Status: FUNCTIONAL

## 2025-06-17 DEVICE — PACING CATH PACEL RIGHT HEART CURVE 5FR: Type: IMPLANTABLE DEVICE | Status: FUNCTIONAL

## 2025-06-17 DEVICE — CATH DX PIG 145 INFIN 5FRX110CM: Type: IMPLANTABLE DEVICE | Status: FUNCTIONAL

## 2025-06-17 RX ORDER — FUROSEMIDE 10 MG/ML
20 INJECTION INTRAMUSCULAR; INTRAVENOUS DAILY
Refills: 0 | Status: DISCONTINUED | OUTPATIENT
Start: 2025-06-18 | End: 2025-06-18

## 2025-06-17 RX ORDER — ASPIRIN 325 MG
81 TABLET ORAL ONCE
Refills: 0 | Status: COMPLETED | OUTPATIENT
Start: 2025-06-18 | End: 2025-06-18

## 2025-06-17 RX ORDER — FERROUS SULFATE 137(45) MG
325 TABLET, EXTENDED RELEASE ORAL DAILY
Refills: 0 | Status: DISCONTINUED | OUTPATIENT
Start: 2025-06-17 | End: 2025-06-18

## 2025-06-17 RX ORDER — ASPIRIN 325 MG
162 TABLET ORAL ONCE
Refills: 0 | Status: DISCONTINUED | OUTPATIENT
Start: 2025-06-17 | End: 2025-06-17

## 2025-06-17 RX ORDER — HEPARIN SODIUM 1000 [USP'U]/ML
5000 INJECTION INTRAVENOUS; SUBCUTANEOUS EVERY 8 HOURS
Refills: 0 | Status: DISCONTINUED | OUTPATIENT
Start: 2025-06-17 | End: 2025-06-18

## 2025-06-17 RX ORDER — LIDOCAINE HYDROCHLORIDE 20 MG/ML
1 JELLY TOPICAL ONCE
Refills: 0 | Status: COMPLETED | OUTPATIENT
Start: 2025-06-17 | End: 2025-06-17

## 2025-06-17 RX ORDER — ACETAMINOPHEN 500 MG/5ML
650 LIQUID (ML) ORAL EVERY 6 HOURS
Refills: 0 | Status: DISCONTINUED | OUTPATIENT
Start: 2025-06-17 | End: 2025-06-18

## 2025-06-17 RX ORDER — NICARDIPINE HCL 30 MG
5 CAPSULE ORAL
Qty: 40 | Refills: 0 | Status: DISCONTINUED | OUTPATIENT
Start: 2025-06-17 | End: 2025-06-18

## 2025-06-17 RX ORDER — ASPIRIN 325 MG
162 TABLET ORAL ONCE
Refills: 0 | Status: COMPLETED | OUTPATIENT
Start: 2025-06-17 | End: 2025-06-17

## 2025-06-17 RX ORDER — ACETAMINOPHEN 500 MG/5ML
1000 LIQUID (ML) ORAL ONCE
Refills: 0 | Status: COMPLETED | OUTPATIENT
Start: 2025-06-17 | End: 2025-06-17

## 2025-06-17 RX ORDER — SENNA 187 MG
2 TABLET ORAL AT BEDTIME
Refills: 0 | Status: DISCONTINUED | OUTPATIENT
Start: 2025-06-17 | End: 2025-06-18

## 2025-06-17 RX ORDER — MAGNESIUM SULFATE 500 MG/ML
2 SYRINGE (ML) INJECTION ONCE
Refills: 0 | Status: COMPLETED | OUTPATIENT
Start: 2025-06-17 | End: 2025-06-17

## 2025-06-17 RX ORDER — ATORVASTATIN CALCIUM 80 MG/1
40 TABLET, FILM COATED ORAL AT BEDTIME
Refills: 0 | Status: DISCONTINUED | OUTPATIENT
Start: 2025-06-17 | End: 2025-06-18

## 2025-06-17 RX ORDER — INSULIN LISPRO 100 U/ML
INJECTION, SOLUTION INTRAVENOUS; SUBCUTANEOUS
Refills: 0 | Status: DISCONTINUED | OUTPATIENT
Start: 2025-06-17 | End: 2025-06-18

## 2025-06-17 RX ORDER — DEXTROSE 50 % IN WATER 50 %
25 SYRINGE (ML) INTRAVENOUS ONCE
Refills: 0 | Status: DISCONTINUED | OUTPATIENT
Start: 2025-06-17 | End: 2025-06-18

## 2025-06-17 RX ORDER — ENOXAPARIN SODIUM 100 MG/ML
100 INJECTION SUBCUTANEOUS
Refills: 0 | DISCHARGE

## 2025-06-17 RX ORDER — DEXTROSE 50 % IN WATER 50 %
15 SYRINGE (ML) INTRAVENOUS ONCE
Refills: 0 | Status: DISCONTINUED | OUTPATIENT
Start: 2025-06-17 | End: 2025-06-18

## 2025-06-17 RX ORDER — CEFAZOLIN SODIUM IN 0.9 % NACL 3 G/100 ML
2000 INTRAVENOUS SOLUTION, PIGGYBACK (ML) INTRAVENOUS EVERY 8 HOURS
Refills: 0 | Status: COMPLETED | OUTPATIENT
Start: 2025-06-17 | End: 2025-06-18

## 2025-06-17 RX ADMIN — Medication 10 MILLIGRAM(S): at 18:39

## 2025-06-17 RX ADMIN — Medication 25 MG/HR: at 18:39

## 2025-06-17 RX ADMIN — Medication 1000 MILLIGRAM(S): at 19:00

## 2025-06-17 RX ADMIN — LIDOCAINE HYDROCHLORIDE 1 PATCH: 20 JELLY TOPICAL at 20:15

## 2025-06-17 RX ADMIN — Medication 25 GRAM(S): at 21:30

## 2025-06-17 RX ADMIN — HEPARIN SODIUM 5000 UNIT(S): 1000 INJECTION INTRAVENOUS; SUBCUTANEOUS at 22:14

## 2025-06-17 RX ADMIN — Medication 162 MILLIGRAM(S): at 22:14

## 2025-06-17 RX ADMIN — Medication 400 MILLIGRAM(S): at 18:16

## 2025-06-17 RX ADMIN — Medication 2 TABLET(S): at 22:13

## 2025-06-17 RX ADMIN — ATORVASTATIN CALCIUM 40 MILLIGRAM(S): 80 TABLET, FILM COATED ORAL at 22:13

## 2025-06-17 NOTE — PROGRESS NOTE ADULT - SUBJECTIVE AND OBJECTIVE BOX
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 Patient placed in VOOR 80 bpm. If after 5 pm please call cards fellow on call to return back to VVIR 60 bpm

## 2025-06-17 NOTE — H&P ADULT - NSHPPOASURGSITEINCISION_GEN_ALL_CORE
Health Maintenance Due   Topic Date Due   • IPV Vaccine (3 of 4 - 4-dose series) 05/22/2020   • Rotavirus Vaccine (3 of 3 - 3-dose series) 05/22/2020   • DTaP/Tdap/Td Vaccine (3 - DTaP) 05/22/2020   • Hepatitis B Vaccine (4 of 4 - 4-dose series) 06/01/2020   • Pneumococcal Vaccine 0-64 (3 of 4) 05/22/2020       Patient is due for the topics as listed above and wishes to proceed with them. Orders placed for immunizations.  Vaccine Information Statement(s) was given today. This has been reviewed, questions answered, and verbal consent given by Parent for injection(s) and administration of vaccines.        Patient tolerated without incident. See immunization grid for documentation.           no

## 2025-06-17 NOTE — H&P ADULT - NSICDXPASTMEDICALHX_GEN_ALL_CORE_FT
PAST MEDICAL HISTORY:  Aortic stenosis     Atrial fibrillation     Cardiac pacemaker [employer]    Carotid bruit     Common iliac aneurysm     Dyspnea on exertion     Gastroenteritis     HLD (hyperlipidemia)     HTN (hypertension)     Polio     Prostate cancer     PVD (peripheral vascular disease)     Stage 3 chronic kidney disease     Superficial vein thrombosis

## 2025-06-17 NOTE — H&P ADULT - NSICDXPASTSURGICALHX_GEN_ALL_CORE_FT
PAST SURGICAL HISTORY:  Cardiac pacemaker     H/O removal of cyst     History of hernia surgery

## 2025-06-17 NOTE — PRE-ANESTHESIA EVALUATION ADULT - LAST ECHOCARDIOGRAM
5/19/25.  report reviewed.  moderate LVH, small cavity, NL LV function, EF 54%.  NL RV. severely dilated LA and RA.  severe AS, PG 75mmHg, MG 2mmHg, STACIE 0.81cm2.  mild AI/MR/TR.  sov 4,2cm, asc aorta 4,2cm

## 2025-06-17 NOTE — H&P ADULT - NSHPSOCIALHISTORY_GEN_ALL_CORE
Alcohol  Smoking  Drugs  ADLS Smoker:   No  ETOH Use:   No  Ilicit Drug Use:   No  Occupation: retired   Assistant Devices:   None  Lives with: Wife

## 2025-06-17 NOTE — BRIEF OPERATIVE NOTE - COMMENTS
Primary Access:     Closure:  Pigtail Access:     Closure:  TVP Access:    Closure:  Primary Access: 16Fr RCFA   Closure: 2 Perc Closures + Angioseal  Pigtail Access: RRA  Closure: TR Band  TVP Access: 6Fr LFCV   Closure: Vascade    Extubated in OR.

## 2025-06-17 NOTE — PRE-ANESTHESIA EVALUATION ADULT - NSANTHADDINFOFT_GEN_ALL_CORE
H&P Adult [Charted Location: [facility] 03PO 03] [Authored: 17-Jun-2025 09:59]- for Visit: [phone], In Progress, Revised, Signed w/additional Signatures Pending, Unsigned - Available to Patient Pending    History and Physical:   Source of Information	Chart(s), Patient  Outpatient Providers	Dr. Tracy Minaya (Referring)       Patient Identity:  · Birth Sex	Male  · Patient's Sexual Orientation	Heterosexual  · Patient's Gender Identity	Male  · Patient's Preferred Pronoun	Him/He     History of Present Illness:  Reason for Admission: SDA for TAVR    History of Present Illness:   89 year old male with family history of CAD and a past medical history of hypertension, hyperlipidemia, Atrial Fibrillation (currently on Eliquis, transitioned to Lovenox Preop), PPM ([facility] in 2018 followed by Dr. Galvez), Thoracic aortic aneurysm, history of Prostate CA, chronic kidney disease (creatine 1.21), chronic diastolic heart failure with severe aortic stenosis (PV 4.11 MG 43), Patient presents as SDA for OR. Patient denies acute pain with radiating or aggravating factors. He denies chest pain, shortness of breath, palpitations, headache, dizziness, nausea, or vomiting.        Review of Systems:  Other Review of Systems: All other review of systems negative, except as noted in HPI      Allergies and Intolerances:        Allergies:  	No Known Allergies:     Home Medications:   * Patient Currently Takes Medications as of 17-Jun-2025 09:38 documented in Structured Notes  · 	Eliquis 5 mg oral tablet: Last Dose Taken: 14-Jun-2025 , 1 tab(s) orally every 12 hours  · 	furosemide 20 mg oral tablet: Last Dose Taken: 15-Ramin-2025 , 1 tab(s) orally every other day  · 	metoprolol succinate 50 mg oral capsule, extended release: Last Dose Taken: 15-Ramin-2025 , 1 cap(s) orally once a day  · 	enoxaparin 100 mg/mL injectable solution: Last Dose Taken: 16-Jun-2025 , 100 milligram(s) subcutaneously once a day  · 	ferrous sulfate 324 mg (65 mg elemental iron) oral tablet: Last Dose Taken: 15-Ramin-2025 , orally every 48 hours  · 	azelastine 137 mcg/inh (0.1%) nasal spray: Last Dose Taken:  , 1 spray(s) in each nostril 2 times a day  · 	atorvastatin 40 mg oral tablet: Last Dose Taken: 15-Ramin-2025 , 1 tab(s) orally once a day  · 	irbesartan 75 mg oral tablet: Last Dose Taken: 15-Ramin-2025 , 1 tab(s) orally once a day    .    Patient History:    Past Medical, Past Surgical, and Family History:  PAST MEDICAL HISTORY:  Aortic stenosis     Atrial fibrillation     Cardiac pacemaker [employer]    Carotid bruit     Common iliac aneurysm     Dyspnea on exertion     Gastroenteritis     HLD (hyperlipidemia)     HTN (hypertension)     Polio     Prostate cancer     PVD (peripheral vascular disease)     Stage 3 chronic kidney disease     Superficial vein thrombosis.     PAST SURGICAL HISTORY:  Cardiac pacemaker     H/O removal of cyst     History of hernia surgery.     Social History:  · Substance use	No  · Social History (marital status, living situation, occupation, and sexual history)	Smoker:   No  ETOH Use:   No  Ilicit Drug Use:   No  Occupation: retired   Assistant Devices:   None  Lives with: Wife  	     Tobacco Screening:  · Core Measure Site	No  · Has the patient used tobacco in the past 30 days?	No    Risk Assessment:    Present on Admission:  Deep Venous Thrombosis	no  Pulmonary Embolus	no  Urinary Catheter	no  Central Venous Catheter/PICC Line	no  Surgical Site Incision	no  Pressure Ulcer(s)	no     HIV Screening:  · In accordance with [place] State law, we offer every patient who comes to our ED an HIV test. Would you like to be tested today?	Opt out     Hepatitis C Test Questions:  · In accordance with [place] State Law, we offer every patient a Hepatitis C test. Would you like to be tested today?	Opt out    Physical Exam:   Physical Exam: General: Well appearing, NAD  Neuro: AxO x3, non-focal, PHILIP  Cardiac: S1S2, systolic ejection murmurs  Pulm: CTA b/l, no wheezing or rales  Abdomen: Soft, NT, ND  Peripheral: +DP pulses b/l, no peripheral edema      Assessment and Plan:   · Completed VTE Risk Assessment(s)	Surgical Assessment Completed on: 17-Jun-2025 11:13  · Completed VTE Risk Assessment(s)	Refer to the Assessment tab to view/cancel completed assessment.      Assessment:  · Assessment	  89 year old male with family history of CAD and a past medical history of hypertension, hyperlipidemia, Atrial Fibrillation (currently on Eliquis, transitioned to Lovenox Preop), PPM ([facility] in 2018 followed by Dr. Galvez), Thoracic aortic aneurysm, history of Prostate CA, chronic kidney disease (creatine 1.21), chronic diastolic heart failure with severe aortic stenosis (PV 4.11 MG 43), Patient presents as SDA for TAVR 6/17/25.    Plan:   Chronic diastolic heart failure with severe aortic stenosis   - Complete consents  - Preop / Postop orders in place  - TTE, Labs, CXR, ECG post procedure  - Recover in 9L Mini ICU postop    AFIB  - Discuss timing to resume Eliquis after postop TTE completed    CKD  - Monitor Creatinine in postop setting     Dispo: 9L            Electronic Signatures:  Loly Cardona)  (Signature Pending)  	Co-Signer: History and Physical, History of Present Illness, Allergies/Medications, Patient History, Risk Assessment, Physical Exam, Assessment and Plan  Susan Rehman)  (Signed 17-Jun-2025 11:13)  	Authored: History and Physical, History of Present Illness, Allergies/Medications, Patient History, Risk Assessment, Physical Exam, Assessment and Plan      Last Updated: 17-Jun-2025 11:13 by Susan Rehman)

## 2025-06-17 NOTE — PRE-ANESTHESIA EVALUATION ADULT - NSANTHPEFT_GEN_ALL_CORE
alert, oriented x3.  bilateral breath sounds.  irregular heart rate alert, oriented x3.  bilateral breath sounds.  regular heart rate

## 2025-06-17 NOTE — CHART NOTE - NSCHARTNOTEFT_GEN_A_CORE
Access:  Primary Access: 16Fr RCFA   Closure: 2 Perc Closures + Angioseal  Pigtail Access: RRA  Closure: TR Band  TVP Access: 6Fr LFCV   Closure: Vascade    PPM   Pre-existing rhythm issues:  None, PPM QRS:  Intra-op rhythm issues: None  Post-op rhythm issues:  None, PPM QRS:  TR Band:    VS:  CONSTITUTIONAL: Well appearing in NAD assessed laying comfortably in bed   NEURO: A&OX3. No focal deficits noted, moving bilateral upper and lower extremities                    CV: RRR, no murmurs, rubs, gallops  RESPIRATORY: Clear to auscultation bilateral posterior lung fields, no wheezes, rales, rhonchi   GI: +BS, NT/ND  MUSCULOSKELETAL: No peripheral edema or calf tenderness. Full strength and ROM bilateral upper and lower extremities   VASCULAR: Bilateral DP, PT pulses 2+  GROIN SITES:    Bed rest: 2 hours   Anti-platelet: ASA 162mg 6/17/25 22:00 and ASA 81mg 6/18/25 0600, Eliquis only thereafter   Dispo: CTU Mini-ICU  TTE/EKG ordered: Access:  Primary Access: 16Fr RCFA   Closure: 2 Perc Closures + Angioseal  Pigtail Access: RRA  Closure: TR Band  TVP Access: 6Fr LFCV   Closure: Vascade    PPM   Pre-existing rhythm issues:  None, PPM QRS:  Intra-op rhythm issues: None  Post-op rhythm issues:  None, PPM QRS:  TR Band:    VS:  CONSTITUTIONAL: Well appearing in NAD assessed laying comfortably in bed   NEURO: A&OX3. No focal deficits noted, moving bilateral upper and lower extremities                    CV: RRR, no murmurs, rubs, gallops  RESPIRATORY: Clear to auscultation bilateral posterior lung fields, no wheezes, rales, rhonchi   GI: +BS, NT/ND  MUSCULOSKELETAL: No peripheral edema or calf tenderness. Full strength and ROM bilateral upper and lower extremities   VASCULAR: Bilateral DP, PT pulses 2+  GROIN SITES:    Bed rest: 4 hours   Anti-platelet: ASA 162mg 6/17/25 22:00 and ASA 81mg 6/18/25 0600  Dispo: 9LA as mini-ICU  TTE/EKG ordered: EKG done, TTE pending Access:  Primary Access: 16Fr RCFA   Closure: 2 Perc Closures + Angioseal  Pigtail Access: RRA  Closure: TR Band  TVP Access: 6Fr LFCV   Closure: Vascade    PPM   Pre-existing rhythm issues: None, PPM QRS: 526  Intra-op rhythm issues: None  Post-op rhythm issues: None, PPM QRS: 482  TR Band: RRA placed 17:15    VS:  CONSTITUTIONAL: Well appearing in NAD assessed laying comfortably in bed   NEURO: A&OX3. No focal deficits noted, moving bilateral upper and lower extremities                    CV: RRR, no murmurs, rubs, gallops  RESPIRATORY: Clear to auscultation bilateral posterior lung fields, no wheezes, rales, rhonchi   GI: +BS, NT/ND  MUSCULOSKELETAL: No peripheral edema or calf tenderness. Full strength and ROM bilateral upper and lower extremities   VASCULAR: Bilateral DP, PT pulses 2+  GROIN SITES: c/d/i soft without hematoma or erythema, small ecchymosis bilaterally     Bed rest: 4 hours   Anti-platelet: ASA 162mg 6/17/25 22:00 and ASA 81mg 6/18/25 0600  Dispo: 9LA as mini-ICU  TTE/EKG ordered: EKG done, TTE pending

## 2025-06-17 NOTE — PROVIDER CONTACT NOTE (OTHER) - ASSESSMENT
Denies n/v, denies pain radiating down the arm. VSS.   cardiac - Paced rhythm in 80s (PPM).   SBP in 120s with 2.5 Cardene gtt.  surgical incisions are dry and intact.   on RA O2 sat at %.

## 2025-06-17 NOTE — H&P ADULT - ASSESSMENT
89 year old male with family history of CAD and a past medical history of hypertension, hyperlipidemia, Atrial Fibrillation (currently on Eliquis, transitioned to Lovenox Preop), PPM ([facility] in 2018 followed by Dr. Galvez), Thoracic aortic aneurysm, history of Prostate CA, chronic kidney disease (creatine 1.21), chronic diastolic heart failure with severe aortic stenosis (PV 4.11 MG 43), Patient presents as SDA for TAVR 6/17/25.    Plan:   Chronic diastolic heart failure with severe aortic stenosis   - Complete consents  - Preop orders  - TTE, Labs, CXR, ECG post procedure  - Recover in 9L Mini ICU postop    AFIB  - Discuss timing to resume Eliquis after postop TTE completed    CKD  - Monitor Creatinine in postop setting        89 year old male with family history of CAD and a past medical history of hypertension, hyperlipidemia, Atrial Fibrillation (currently on Eliquis, transitioned to Lovenox Preop), PPM ([facility] in 2018 followed by Dr. Galvez), Thoracic aortic aneurysm, history of Prostate CA, chronic kidney disease (creatine 1.21), chronic diastolic heart failure with severe aortic stenosis (PV 4.11 MG 43), Patient presents as SDA for TAVR 6/17/25.    Plan:   Chronic diastolic heart failure with severe aortic stenosis   - Complete consents  - Preop / Postop orders in place  - TTE, Labs, CXR, ECG post procedure  - Recover in 9L Mini ICU postop    AFIB  - Discuss timing to resume Eliquis after postop TTE completed    CKD  - Monitor Creatinine in postop setting     Dispo: 9L

## 2025-06-17 NOTE — PROVIDER CONTACT NOTE (OTHER) - ACTION/TREATMENT ORDERED:
RN notified pt to notify RN when/if there are any physiological changes and we will be monitor changes via continuous cardiac tele and hourly rounding.

## 2025-06-17 NOTE — H&P ADULT - HISTORY OF PRESENT ILLNESS
89 year old male with family history of CAD and a past medical history of hypertension, hyperlipidemia, Atrial Fibrillation (currently on Eliquis, transitioned to Lovenox Preop), PPM ([facility] in 2018 followed by Dr. Galvez), Thoracic aortic aneurysm, history of Prostate CA, chronic kidney disease (creatine 1.21), chronic diastolic heart failure with severe aortic stenosis (PV 4.11 MG 43), Patient presents as SDA for OR. Patient denies acute pain with radiating or aggravating factors. He denies chest pain, shortness of breath, palpitations, headache, dizziness, nausea, or vomiting.

## 2025-06-17 NOTE — H&P ADULT - NSHPPHYSICALEXAM_GEN_ALL_CORE
General: Well appearing, NAD  Neuro: AxO x3, non-focal, PHILIP  Cardiac: S1S2, systolic ejection murmurs  Pulm: CTA b/l, no wheezing or rales  Abdomen: Soft, NT, ND, hypoactive BS  Peripheral: +DP pulses b/l, no peripheral edema General: Well appearing, NAD  Neuro: AxO x3, non-focal, PHILIP  Cardiac: S1S2, systolic ejection murmurs  Pulm: CTA b/l, no wheezing or rales  Abdomen: Soft, NT, ND  Peripheral: +DP pulses b/l, no peripheral edema

## 2025-06-17 NOTE — PROVIDER CONTACT NOTE (OTHER) - SITUATION
Pt c/o chest tightness pointing to the area on the left side of his chest. Pt received IV tylenol around 6pm for spine pain.

## 2025-06-18 ENCOUNTER — TRANSCRIPTION ENCOUNTER (OUTPATIENT)
Age: 89
End: 2025-06-18

## 2025-06-18 ENCOUNTER — RESULT REVIEW (OUTPATIENT)
Age: 89
End: 2025-06-18

## 2025-06-18 VITALS
DIASTOLIC BLOOD PRESSURE: 70 MMHG | SYSTOLIC BLOOD PRESSURE: 129 MMHG | RESPIRATION RATE: 14 BRPM | OXYGEN SATURATION: 99 % | HEART RATE: 60 BPM

## 2025-06-18 LAB
ALBUMIN SERPL ELPH-MCNC: 3.5 G/DL — SIGNIFICANT CHANGE UP (ref 3.3–5)
ALP SERPL-CCNC: 177 U/L — HIGH (ref 40–120)
ALT FLD-CCNC: 18 U/L — SIGNIFICANT CHANGE UP (ref 10–45)
ANION GAP SERPL CALC-SCNC: 16 MMOL/L — SIGNIFICANT CHANGE UP (ref 5–17)
APTT BLD: 37.2 SEC — HIGH (ref 26.1–36.8)
AST SERPL-CCNC: 23 U/L — SIGNIFICANT CHANGE UP (ref 10–40)
BASOPHILS # BLD AUTO: 0 K/UL — SIGNIFICANT CHANGE UP (ref 0–0.2)
BASOPHILS NFR BLD AUTO: 0 % — SIGNIFICANT CHANGE UP (ref 0–2)
BILIRUB SERPL-MCNC: 0.9 MG/DL — SIGNIFICANT CHANGE UP (ref 0.2–1.2)
BUN SERPL-MCNC: 23 MG/DL — SIGNIFICANT CHANGE UP (ref 7–23)
CALCIUM SERPL-MCNC: 9.4 MG/DL — SIGNIFICANT CHANGE UP (ref 8.4–10.5)
CHLORIDE SERPL-SCNC: 104 MMOL/L — SIGNIFICANT CHANGE UP (ref 96–108)
CO2 SERPL-SCNC: 17 MMOL/L — LOW (ref 22–31)
CREAT SERPL-MCNC: 1.12 MG/DL — SIGNIFICANT CHANGE UP (ref 0.5–1.3)
EGFR: 63 ML/MIN/1.73M2 — SIGNIFICANT CHANGE UP
EGFR: 63 ML/MIN/1.73M2 — SIGNIFICANT CHANGE UP
EOSINOPHIL # BLD AUTO: 0 K/UL — SIGNIFICANT CHANGE UP (ref 0–0.5)
EOSINOPHIL NFR BLD AUTO: 0 % — SIGNIFICANT CHANGE UP (ref 0–6)
GAS PNL BLDA: SIGNIFICANT CHANGE UP
GLUCOSE SERPL-MCNC: 122 MG/DL — HIGH (ref 70–99)
HCT VFR BLD CALC: 31.9 % — LOW (ref 39–50)
HGB BLD-MCNC: 10.7 G/DL — LOW (ref 13–17)
IMM GRANULOCYTES # BLD AUTO: 0.02 K/UL — SIGNIFICANT CHANGE UP (ref 0–0.07)
IMM GRANULOCYTES NFR BLD AUTO: 0.3 % — SIGNIFICANT CHANGE UP (ref 0–0.9)
INR BLD: 1.15 — SIGNIFICANT CHANGE UP (ref 0.85–1.16)
LYMPHOCYTES # BLD AUTO: 0.51 K/UL — LOW (ref 1–3.3)
LYMPHOCYTES NFR BLD AUTO: 8.6 % — LOW (ref 13–44)
MAGNESIUM SERPL-MCNC: 2.3 MG/DL — SIGNIFICANT CHANGE UP (ref 1.6–2.6)
MCHC RBC-ENTMCNC: 31.6 PG — SIGNIFICANT CHANGE UP (ref 27–34)
MCHC RBC-ENTMCNC: 33.5 G/DL — SIGNIFICANT CHANGE UP (ref 32–36)
MCV RBC AUTO: 94.1 FL — SIGNIFICANT CHANGE UP (ref 80–100)
MONOCYTES # BLD AUTO: 0.3 K/UL — SIGNIFICANT CHANGE UP (ref 0–0.9)
MONOCYTES NFR BLD AUTO: 5.1 % — SIGNIFICANT CHANGE UP (ref 2–14)
NEUTROPHILS # BLD AUTO: 5.07 K/UL — SIGNIFICANT CHANGE UP (ref 1.8–7.4)
NEUTROPHILS NFR BLD AUTO: 86 % — HIGH (ref 43–77)
NRBC # BLD AUTO: 0 K/UL — SIGNIFICANT CHANGE UP (ref 0–0)
NRBC # FLD: 0 K/UL — SIGNIFICANT CHANGE UP (ref 0–0)
NRBC BLD AUTO-RTO: 0 /100 WBCS — SIGNIFICANT CHANGE UP (ref 0–0)
PHOSPHATE SERPL-MCNC: 4.4 MG/DL — SIGNIFICANT CHANGE UP (ref 2.5–4.5)
PLATELET # BLD AUTO: 123 K/UL — LOW (ref 150–400)
PMV BLD: 9.3 FL — SIGNIFICANT CHANGE UP (ref 7–13)
POTASSIUM SERPL-MCNC: 4.5 MMOL/L — SIGNIFICANT CHANGE UP (ref 3.5–5.3)
POTASSIUM SERPL-SCNC: 4.5 MMOL/L — SIGNIFICANT CHANGE UP (ref 3.5–5.3)
PROT SERPL-MCNC: 7.5 G/DL — SIGNIFICANT CHANGE UP (ref 6–8.3)
PROTHROM AB SERPL-ACNC: 13.4 SEC — SIGNIFICANT CHANGE UP (ref 9.9–13.4)
RBC # BLD: 3.39 M/UL — LOW (ref 4.2–5.8)
RBC # FLD: 13.4 % — SIGNIFICANT CHANGE UP (ref 10.3–14.5)
SODIUM SERPL-SCNC: 137 MMOL/L — SIGNIFICANT CHANGE UP (ref 135–145)
WBC # BLD: 5.9 K/UL — SIGNIFICANT CHANGE UP (ref 3.8–10.5)
WBC # FLD AUTO: 5.9 K/UL — SIGNIFICANT CHANGE UP (ref 3.8–10.5)

## 2025-06-18 PROCEDURE — 86923 COMPATIBILITY TEST ELECTRIC: CPT

## 2025-06-18 PROCEDURE — 83735 ASSAY OF MAGNESIUM: CPT

## 2025-06-18 PROCEDURE — 97161 PT EVAL LOW COMPLEX 20 MIN: CPT

## 2025-06-18 PROCEDURE — 82962 GLUCOSE BLOOD TEST: CPT

## 2025-06-18 PROCEDURE — 82803 BLOOD GASES ANY COMBINATION: CPT

## 2025-06-18 PROCEDURE — 93312 ECHO TRANSESOPHAGEAL: CPT

## 2025-06-18 PROCEDURE — 86901 BLOOD TYPING SEROLOGIC RH(D): CPT

## 2025-06-18 PROCEDURE — C1760: CPT

## 2025-06-18 PROCEDURE — 93306 TTE W/DOPPLER COMPLETE: CPT

## 2025-06-18 PROCEDURE — 83605 ASSAY OF LACTIC ACID: CPT

## 2025-06-18 PROCEDURE — 93306 TTE W/DOPPLER COMPLETE: CPT | Mod: 26

## 2025-06-18 PROCEDURE — 84295 ASSAY OF SERUM SODIUM: CPT

## 2025-06-18 PROCEDURE — C1769: CPT

## 2025-06-18 PROCEDURE — 83880 ASSAY OF NATRIURETIC PEPTIDE: CPT

## 2025-06-18 PROCEDURE — C1887: CPT

## 2025-06-18 PROCEDURE — C9399: CPT

## 2025-06-18 PROCEDURE — 86850 RBC ANTIBODY SCREEN: CPT

## 2025-06-18 PROCEDURE — 85610 PROTHROMBIN TIME: CPT

## 2025-06-18 PROCEDURE — 85025 COMPLETE CBC W/AUTO DIFF WBC: CPT

## 2025-06-18 PROCEDURE — 86900 BLOOD TYPING SEROLOGIC ABO: CPT

## 2025-06-18 PROCEDURE — C1889: CPT

## 2025-06-18 PROCEDURE — 82330 ASSAY OF CALCIUM: CPT

## 2025-06-18 PROCEDURE — 85027 COMPLETE CBC AUTOMATED: CPT

## 2025-06-18 PROCEDURE — L8699: CPT

## 2025-06-18 PROCEDURE — 85730 THROMBOPLASTIN TIME PARTIAL: CPT

## 2025-06-18 PROCEDURE — 71045 X-RAY EXAM CHEST 1 VIEW: CPT

## 2025-06-18 PROCEDURE — 80053 COMPREHEN METABOLIC PANEL: CPT

## 2025-06-18 PROCEDURE — C1894: CPT

## 2025-06-18 PROCEDURE — 84132 ASSAY OF SERUM POTASSIUM: CPT

## 2025-06-18 PROCEDURE — 84100 ASSAY OF PHOSPHORUS: CPT

## 2025-06-18 PROCEDURE — 36415 COLL VENOUS BLD VENIPUNCTURE: CPT

## 2025-06-18 PROCEDURE — 71045 X-RAY EXAM CHEST 1 VIEW: CPT | Mod: 26

## 2025-06-18 RX ORDER — APIXABAN 2.5 MG/1
1 TABLET, FILM COATED ORAL
Qty: 60 | Refills: 0
Start: 2025-06-18 | End: 2025-07-17

## 2025-06-18 RX ORDER — FUROSEMIDE 10 MG/ML
1 INJECTION INTRAMUSCULAR; INTRAVENOUS
Qty: 15 | Refills: 0
Start: 2025-06-18 | End: 2025-07-17

## 2025-06-18 RX ORDER — ATORVASTATIN CALCIUM 80 MG/1
1 TABLET, FILM COATED ORAL
Qty: 30 | Refills: 0
Start: 2025-06-18 | End: 2025-07-17

## 2025-06-18 RX ORDER — FUROSEMIDE 10 MG/ML
1 INJECTION INTRAMUSCULAR; INTRAVENOUS
Refills: 0 | DISCHARGE

## 2025-06-18 RX ORDER — METOPROLOL SUCCINATE 50 MG/1
1 TABLET, EXTENDED RELEASE ORAL
Qty: 30 | Refills: 0
Start: 2025-06-18 | End: 2025-07-17

## 2025-06-18 RX ORDER — IRBESARTAN 75 MG/1
1 TABLET ORAL
Qty: 30 | Refills: 0
Start: 2025-06-18 | End: 2025-07-17

## 2025-06-18 RX ORDER — ACETAMINOPHEN 500 MG/5ML
2 LIQUID (ML) ORAL
Qty: 112 | Refills: 0
Start: 2025-06-18 | End: 2025-07-01

## 2025-06-18 RX ORDER — SENNA 187 MG
2 TABLET ORAL
Qty: 14 | Refills: 0
Start: 2025-06-18 | End: 2025-06-24

## 2025-06-18 RX ADMIN — Medication 3 MILLILITER(S): at 15:16

## 2025-06-18 RX ADMIN — FUROSEMIDE 20 MILLIGRAM(S): 10 INJECTION INTRAMUSCULAR; INTRAVENOUS at 06:39

## 2025-06-18 RX ADMIN — Medication 100 MILLIGRAM(S): at 01:14

## 2025-06-18 RX ADMIN — Medication 40 MILLIGRAM(S): at 11:01

## 2025-06-18 RX ADMIN — INSULIN LISPRO 4: 100 INJECTION, SOLUTION INTRAVENOUS; SUBCUTANEOUS at 10:36

## 2025-06-18 RX ADMIN — HEPARIN SODIUM 5000 UNIT(S): 1000 INJECTION INTRAVENOUS; SUBCUTANEOUS at 15:24

## 2025-06-18 RX ADMIN — LIDOCAINE HYDROCHLORIDE 1 PATCH: 20 JELLY TOPICAL at 06:35

## 2025-06-18 RX ADMIN — Medication 100 MILLIGRAM(S): at 07:50

## 2025-06-18 RX ADMIN — HEPARIN SODIUM 5000 UNIT(S): 1000 INJECTION INTRAVENOUS; SUBCUTANEOUS at 06:40

## 2025-06-18 RX ADMIN — Medication 81 MILLIGRAM(S): at 06:39

## 2025-06-18 RX ADMIN — Medication 325 MILLIGRAM(S): at 11:01

## 2025-06-18 RX ADMIN — LIDOCAINE HYDROCHLORIDE 1 PATCH: 20 JELLY TOPICAL at 07:21

## 2025-06-18 NOTE — DISCHARGE NOTE NURSING/CASE MANAGEMENT/SOCIAL WORK - PATIENT PORTAL LINK FT
You can access the FollowMyHealth Patient Portal offered by [facility] by registering at the following website: [email or web address]. By joining [facility]’s FollowMyHealth portal, you will also be able to view your health information using other applications (apps) compatible with our system.

## 2025-06-18 NOTE — DISCHARGE NOTE PROVIDER - CARE PROVIDERS DIRECT ADDRESSES
[email or web address]

## 2025-06-18 NOTE — PHYSICAL THERAPY INITIAL EVALUATION ADULT - SOCIAL CONCERNS
Quality 110: Preventive Care And Screening: Influenza Immunization: Influenza Immunization Administered during Influenza season
Detail Level: Detailed
Quality 226: Preventive Care And Screening: Tobacco Use: Screening And Cessation Intervention: Patient screened for tobacco use and is an ex/non-smoker
None
Quality 111:Pneumonia Vaccination Status For Older Adults: Pneumococcal Vaccination Previously Received
Quality 130: Documentation Of Current Medications In The Medical Record: Current Medications Documented
Quality 131: Pain Assessment And Follow-Up: Pain assessment using a standardized tool is documented as negative, no follow-up plan required
Quality 402: Tobacco Use And Help With Quitting Among Adolescents: Patient screened for tobacco and never smoked
verbal instruction

## 2025-06-18 NOTE — DISCHARGE NOTE PROVIDER - NSDCCPTREATMENT_GEN_ALL_CORE_FT
PRINCIPAL PROCEDURE  Procedure: TAVR, percutaneous  Findings and Treatment: via RCFA (29mm Amita)

## 2025-06-18 NOTE — DISCHARGE NOTE PROVIDER - CARE PROVIDER_API CALL
Loly Cardona  Thoracic and Cardiac Surgery  [address] [address]  Phone: [phone]  Fax: [phone]  Scheduled Appointment: 06/27/2025 09:30 AM    Tracy Minaya  Cardiovascular Disease  [address]  [place] [address]  Phone: [phone]  Fax: [phone]  Follow Up Time: 2 weeks

## 2025-06-18 NOTE — DISCHARGE NOTE NURSING/CASE MANAGEMENT/SOCIAL WORK - FINANCIAL ASSISTANCE
[FreeTextEntry3] : This note was written by Chanelle Cobb on 05/15/2023, acting as a scribe for Dr. David Sauer DO.\par \par All medic record entries were at my, Dr. David Sauer DO , direction and personally dictated by me in 05/15/2023. I have personally reviewed the chart and agree that the record accurately reflects my personal performance of the history, physical exam, assessment, and plan.\par  
[facility] provides services at a reduced cost to those who are determined to be eligible through [facility]’s financial assistance program. Information regarding [facility]’s financial assistance program can be found by going to [email or web address] or by calling [phone].

## 2025-06-18 NOTE — DISCHARGE NOTE NURSING/CASE MANAGEMENT/SOCIAL WORK - NSDCPEFALRISK_GEN_ALL_CORE
For information on Fall & Injury Prevention, visit: [email or web address] OR  [email or web address] OR  https://www.cdc.gov/steadi/patient.html

## 2025-06-18 NOTE — DISCHARGE NOTE PROVIDER - HOSPITAL COURSE
Patient discussed on morning rounds with Dr. Louise  Operation Date: 6/17/25 TAVR (29mm) via RCFA, EF WNL  Primary Surgeon/Attending MD: Anthony Garrison  Referring Physician: Tracy Minaya  _ _ _ _ _ _ _ _ _ _ _ _   HOSPITAL COURSE:   88 yo M FHx CAD and PMHx hypertension, hyperlipidemia, atrial fibrillation, PPM ([facility] in [place] followed by Dr. Galvez), thoracic aortic aneurysm, prostate cancer, chronic kidney disease, chronic diastolic heart failure with severe aortic stenosis (PV 4.11 MG 43) presented 6/17/25 to [facility] SDA and underwent TAVR (29mm) via RCFA, EF WNL. No intraoperative arrythmia or QRS prolongation, paced with PPM. Arrived to CTU as mini-ICU without complication or post-operative QRS prolongation or arrythmia/heart block, paced with PPM. POD1, per Dr. Garrison, patient is hemodynamically stable to be discharged. Patient tolerating pain, eating well, ambulating, and passing gas upon discharge.   _ _ _ _ _ _ _ _ _ _ _ _  DISCHARGE PHYSICAL EXAM:   CONSTITUTIONAL: Well appearing in NAD assessed sitting comfortably in chair  NEURO: A&OX3. No focal deficits noted, moving bilateral upper and lower extremities                    CV: RRR, no murmurs, rubs, gallops  RESPIRATORY: Clear to auscultation bilateral posterior lung fields, no wheezes, rales, rhonchi   GI: +BS, NT/ND  MUSCULOSKELETAL: No peripheral edema or calf tenderness. Full strength and ROM bilateral upper and lower extremities   VASCULAR: Bilateral distal pulses 2+  INCISIONS: groin sites c/d/i without hematoma or erythema,    _ _ _ _ _ _ _ _   REMOVAL CHECKLIST:         [N/A] Epicardial wires         [N/A] Stitches/tie downs,   If no, why?          [N/A] PICC/Midline,   If no, why?    _ _ _ _ _ _ _ _ _ _ _ _   MEDICATION DISCHARGE CHECKLIST     TAVR        Anticoagulation plan: [ ] Aspirin, [ ] Plavix, [x] Eliquis, [ ] Other:        Keep one:        Chronic diastolic heart failure treated with TAVR         Anticoagulation         [Y] NOAC – Eliquis, [ ] Reason: afib               Cost/Insurance barriers addressed: YES/NO          [N] Coumadin, Indication:                INR Goal:               Follow up:   _ _ _ _ _ _ _ _ _ _ _   RELEVANT LABS/IMAGING:   < from: JORDANA w/Doppler (06.17.25 @ 15:49) >    POST-PROCEDURAL CONCLUSIONS:     1. Left ventricular systolic function is normal.   2. Normal right ventricular systolic function.   3. A 29 mm ESTELLE 3 Ultra RESILIA (TAVR) is present in the aortic   position. The prosthetic valve is well seated. No intravalvular   regurgitation. Trace paravalvular regurgitation. Peak transaortic   velocity is 1.18 m/s, peak transaortic gradient is 5.5 mmHg and mean   transaortic gradient is 2.8 mmHg with an LVOT/aortic valve VTI ratio of   0.63. Aortic valve area is estimated at 2.05 cm² by the continuity   equation and indexed at 1.05 cm²/m².   4. The left atrium is dilated. No echodensities observed in the left   atrium. Watchman LAAO device visualized in stable position in left atrial   appendage without evidence of paradevice leak. Dense sludge/thrombus   noted within atrial appendage and LAAO device.   5. There is an iatrogenic atrial septal defect with left to right   shunting.   6. Mild mitral regurgitation.   7. Mild tricuspid regurgitation.   8. No pericardial effusion seen.    < end of copied text >  _  _ _ _ _ _ _ _ _ _ _   CLINICAL FOLLOW UP NEEDS:      [ ] Lab work needed:      [ ] Imaging needed:      [ ] Home equipment            Type: (i.e. wound vac, pneumostat, prevena, wet/dry dressings, picc/midlines, MCOT, george etc)   _ _ _ _ _ _ _ _ _ _ _ _   Over 35 minutes was spent with the patient reviewing the discharge material including medications, follow up appointments, recovery, concerning symptoms, and how to contact their health care providers if they have questions.   Patient discussed on morning rounds with Dr. Louise  Operation Date: 6/17/25 TAVR (29mm) via RCFA, EF WNL  Primary Surgeon/Attending MD: Loly Cardona  Referring Physician: Tracy Minaya  _ _ _ _ _ _ _ _ _ _ _ _   HOSPITAL COURSE:   88 yo M FHx CAD and PMHx hypertension, hyperlipidemia, atrial fibrillation, PPM ([facility] in [place] followed by Dr. Galvez), thoracic aortic aneurysm, prostate cancer, chronic kidney disease, chronic diastolic heart failure with severe aortic stenosis (PV 4.11 MG 43) presented 6/17/25 to [facility] SDA and underwent TAVR (29mm) via RCFA, EF WNL. No intraoperative arrythmia or QRS prolongation, paced with PPM. Arrived to CTU as mini-ICU without complication or post-operative QRS prolongation or arrythmia/heart block, paced with PPM. POD1, per Dr. Garrison, patient is hemodynamically stable to be discharged. Patient tolerating pain, eating well, ambulating, and passing gas upon discharge.   _ _ _ _ _ _ _ _ _ _ _ _  DISCHARGE PHYSICAL EXAM:   CONSTITUTIONAL: Well appearing in NAD assessed sitting comfortably in chair  NEURO: A&OX3. No focal deficits noted, moving bilateral upper and lower extremities                    CV: RRR, no murmurs, rubs, gallops  RESPIRATORY: Clear to auscultation bilateral posterior lung fields, no wheezes, rales, rhonchi   GI: +BS, NT/ND  MUSCULOSKELETAL: No peripheral edema or calf tenderness. Full strength and ROM bilateral upper and lower extremities   VASCULAR: Bilateral distal pulses 2+  INCISIONS: groin sites c/d/i without hematoma or erythema,    _ _ _ _ _ _ _ _   REMOVAL CHECKLIST:         [N/A] Epicardial wires         [N/A] Stitches/tie downs,   If no, why?          [N/A] PICC/Midline,   If no, why?    _ _ _ _ _ _ _ _ _ _ _ _   MEDICATION DISCHARGE CHECKLIST     TAVR        Anticoagulation plan: [ ] Aspirin, [ ] Plavix, [x] Eliquis, [ ] Other:        Keep one:        Chronic diastolic heart failure treated with TAVR         Anticoagulation         [Y] NOAC – Eliquis, [ ] Reason: afib               Cost/Insurance barriers addressed: YES/NO          [N] Coumadin, Indication:                INR Goal:               Follow up:   _ _ _ _ _ _ _ _ _ _ _   RELEVANT LABS/IMAGING:   < from: JORDANA w/Doppler (06.17.25 @ 15:49) >    POST-PROCEDURAL CONCLUSIONS:     1. Left ventricular systolic function is normal.   2. Normal right ventricular systolic function.   3. A 29 mm ESTELLE 3 Ultra RESILIA (TAVR) is present in the aortic   position. The prosthetic valve is well seated. No intravalvular   regurgitation. Trace paravalvular regurgitation. Peak transaortic   velocity is 1.18 m/s, peak transaortic gradient is 5.5 mmHg and mean   transaortic gradient is 2.8 mmHg with an LVOT/aortic valve VTI ratio of   0.63. Aortic valve area is estimated at 2.05 cm² by the continuity   equation and indexed at 1.05 cm²/m².   4. The left atrium is dilated. No echodensities observed in the left   atrium. Watchman LAAO device visualized in stable position in left atrial   appendage without evidence of paradevice leak. Dense sludge/thrombus   noted within atrial appendage and LAAO device.   5. There is an iatrogenic atrial septal defect with left to right   shunting.   6. Mild mitral regurgitation.   7. Mild tricuspid regurgitation.   8. No pericardial effusion seen.    < end of copied text >  _  _ _ _ _ _ _ _ _ _ _   CLINICAL FOLLOW UP NEEDS:      [ ] Lab work needed:      [ ] Imaging needed:      [ ] Home equipment            Type: (i.e. wound vac, pneumostat, prevena, wet/dry dressings, picc/midlines, MCOT, george etc)   _ _ _ _ _ _ _ _ _ _ _ _   Over 35 minutes was spent with the patient reviewing the discharge material including medications, follow up appointments, recovery, concerning symptoms, and how to contact their health care providers if they have questions.   Patient discussed on morning rounds with Dr. Louise  Operation Date: 6/17/25 TAVR (29mm) via RCFA, EF WNL  Primary Surgeon/Attending MD: Loly Cardona  Referring Physician: Tracy Minaya  _ _ _ _ _ _ _ _ _ _ _ _   HOSPITAL COURSE:   88 yo M FHx CAD and PMHx hypertension, hyperlipidemia, atrial fibrillation, PPM ([facility] in [place] followed by Dr. Galvez), thoracic aortic aneurysm, prostate cancer, chronic kidney disease, chronic diastolic heart failure with severe aortic stenosis (PV 4.11 MG 43) presented 6/17/25 to [facility] SDA and underwent TAVR (29mm) via RCFA, EF WNL. No intraoperative arrythmia or QRS prolongation, paced with PPM. Arrived to CTU as mini-ICU without complication or post-operative QRS prolongation or arrythmia/heart block, paced with PPM. POD1, per Dr. Garrison, patient is hemodynamically stable to be discharged. Patient tolerating pain, eating well, ambulating, and passing gas upon discharge.   _ _ _ _ _ _ _ _ _ _ _ _  DISCHARGE PHYSICAL EXAM:   CONSTITUTIONAL: Well appearing in NAD assessed sitting comfortably in chair  NEURO: A&OX3. No focal deficits noted, moving bilateral upper and lower extremities                    CV: RRR, no murmurs, rubs, gallops  RESPIRATORY: Clear to auscultation bilateral posterior lung fields, no wheezes, rales, rhonchi   GI: +BS, NT/ND  MUSCULOSKELETAL: No peripheral edema or calf tenderness. Full strength and ROM bilateral upper and lower extremities   VASCULAR: Bilateral distal pulses 2+  INCISIONS: groin sites c/d/i without hematoma or erythema,    _ _ _ _ _ _ _ _   REMOVAL CHECKLIST:         [N/A] Epicardial wires         [N/A] Stitches/tie downs,   If no, why?          [N/A] PICC/Midline,   If no, why?    _ _ _ _ _ _ _ _ _ _ _ _   MEDICATION DISCHARGE CHECKLIST     TAVR        Anticoagulation plan: [ ] Aspirin, [ ] Plavix, [x] Eliquis, [ ] Other:        Keep one:        Chronic diastolic heart failure treated with TAVR         Anticoagulation         [Y] NOAC – Eliquis, [ ] Reason: afib               Cost/Insurance barriers addressed: YES/NO          [N] Coumadin, Indication:                INR Goal:               Follow up:   _ _ _ _ _ _ _ _ _ _ _   RELEVANT LABS/IMAGING:   < from: JORDANA w/Doppler (06.17.25 @ 15:49) >    POST-PROCEDURAL CONCLUSIONS:     1. Left ventricular systolic function is normal.   2. Normal right ventricular systolic function.   3. A 29 mm ESTELLE 3 Ultra RESILIA (TAVR) is present in the aortic   position. The prosthetic valve is well seated. No intravalvular   regurgitation. Trace paravalvular regurgitation. Peak transaortic   velocity is 1.18 m/s, peak transaortic gradient is 5.5 mmHg and mean   transaortic gradient is 2.8 mmHg with an LVOT/aortic valve VTI ratio of   0.63. Aortic valve area is estimated at 2.05 cm² by the continuity   equation and indexed at 1.05 cm²/m².   4. The left atrium is dilated. No echodensities observed in the left   atrium. Watchman LAAO device visualized in stable position in left atrial   appendage without evidence of paradevice leak. Dense sludge/thrombus   noted within atrial appendage and LAAO device.   5. There is an iatrogenic atrial septal defect with left to right   shunting.   6. Mild mitral regurgitation.   7. Mild tricuspid regurgitation.   8. No pericardial effusion seen.    < end of copied text >  _ _ _ _ _ _ _ _ _ _ _ _   Over 35 minutes was spent with the patient reviewing the discharge material including medications, follow up appointments, recovery, concerning symptoms, and how to contact their health care providers if they have questions.   Patient discussed on morning rounds with Dr. Louise  Operation Date: 6/17/25 TAVR (29mm) via RCFA, EF WNL  Primary Surgeon/Attending MD: Loly Cardona  Referring Physician: Tracy Minaya  _ _ _ _ _ _ _ _ _ _ _ _   HOSPITAL COURSE:   89 yo M FHx CAD and PMHx hypertension, hyperlipidemia, atrial fibrillation, PPM ([facility] in [place] followed by Dr. Galvez), thoracic aortic aneurysm, prostate cancer, chronic kidney disease, chronic diastolic heart failure with severe aortic stenosis (PV 4.11 MG 43) presented 6/17/25 to [facility] SDA and underwent TAVR (29mm) via RCFA, EF WNL. No intraoperative arrythmia or QRS prolongation, paced with PPM. Arrived to CTU as mini-ICU without complication or post-operative QRS prolongation or arrythmia/heart block, paced with PPM. POD1, per Dr. Garrison, patient is hemodynamically stable to be discharged. Patient tolerating pain, eating well, ambulating, and passing gas upon discharge.   _ _ _ _ _ _ _ _ _ _ _ _  DISCHARGE PHYSICAL EXAM:   CONSTITUTIONAL: Well appearing in NAD assessed sitting comfortably in chair  NEURO: A&OX3. No focal deficits noted, moving bilateral upper and lower extremities                    CV: RRR, no murmurs, rubs, gallops  RESPIRATORY: Clear to auscultation bilateral posterior lung fields, no wheezes, rales, rhonchi   GI: +BS, NT/ND  MUSCULOSKELETAL: No peripheral edema or calf tenderness. Full strength and ROM bilateral upper and lower extremities   VASCULAR: Bilateral distal pulses 2+  INCISIONS: groin sites c/d/i without hematoma or erythema, RRA site c/d/i without hematoma   _ _ _ _ _ _ _ _   REMOVAL CHECKLIST:         [N/A] Epicardial wires         [N/A] Stitches/tie downs,   If no, why?          [N/A] PICC/Midline,   If no, why?    _ _ _ _ _ _ _ _ _ _ _ _   MEDICATION DISCHARGE CHECKLIST     TAVR        Anticoagulation plan: [ ] Aspirin, [ ] Plavix, [x] Eliquis, [ ] Other:        Keep one:        Chronic diastolic heart failure treated with TAVR         Anticoagulation         [Y] NOAC – Eliquis, [ ] Reason: afib               Cost/Insurance barriers addressed: YES/NO          [N] Coumadin, Indication:                INR Goal:               Follow up:   _ _ _ _ _ _ _ _ _ _ _   RELEVANT LABS/IMAGING:   < from: JORDANA w/Doppler (06.17.25 @ 15:49) >    POST-PROCEDURAL CONCLUSIONS:     1. Left ventricular systolic function is normal.   2. Normal right ventricular systolic function.   3. A 29 mm ESTELLE 3 Ultra RESILIA (TAVR) is present in the aortic   position. The prosthetic valve is well seated. No intravalvular   regurgitation. Trace paravalvular regurgitation. Peak transaortic   velocity is 1.18 m/s, peak transaortic gradient is 5.5 mmHg and mean   transaortic gradient is 2.8 mmHg with an LVOT/aortic valve VTI ratio of   0.63. Aortic valve area is estimated at 2.05 cm² by the continuity   equation and indexed at 1.05 cm²/m².   4. The left atrium is dilated. No echodensities observed in the left   atrium. Watchman LAAO device visualized in stable position in left atrial   appendage without evidence of paradevice leak. Dense sludge/thrombus   noted within atrial appendage and LAAO device.   5. There is an iatrogenic atrial septal defect with left to right   shunting.   6. Mild mitral regurgitation.   7. Mild tricuspid regurgitation.   8. No pericardial effusion seen.    < end of copied text >  _ _ _ _ _ _ _ _ _ _ _ _   Over 35 minutes was spent with the patient reviewing the discharge material including medications, follow up appointments, recovery, concerning symptoms, and how to contact their health care providers if they have questions.

## 2025-06-18 NOTE — DISCHARGE NOTE PROVIDER - PROVIDER TOKENS
PROVIDER:[TOKEN:[[id number]],SCHEDULEDAPPT:[06/27/2025],SCHEDULEDAPPTTIME:[09:30 AM]],PROVIDER:[TOKEN:[[id number]],FOLLOWUP:[2 weeks]]

## 2025-06-18 NOTE — DISCHARGE NOTE PROVIDER - NSDCMRMEDTOKEN_GEN_ALL_CORE_FT
atorvastatin 40 mg oral tablet: 1 tab(s) orally once a day  azelastine 137 mcg/inh (0.1%) nasal spray: 1 spray(s) in each nostril 2 times a day  Eliquis 5 mg oral tablet: 1 tab(s) orally every 12 hours  ferrous sulfate 324 mg (65 mg elemental iron) oral tablet: orally every 48 hours  furosemide 20 mg oral tablet: 1 tab(s) orally every other day  irbesartan 75 mg oral tablet: 1 tab(s) orally once a day  metoprolol succinate 50 mg oral capsule, extended release: 1 cap(s) orally once a day   azelastine 137 mcg/inh (0.1%) nasal spray: 1 spray(s) in each nostril 2 times a day  ferrous sulfate 324 mg (65 mg elemental iron) oral tablet: orally every 48 hours   acetaminophen 325 mg oral tablet: 2 tab(s) orally every 6 hours as needed for Moderate Pain (4 - 6)  atorvastatin 40 mg oral tablet: 1 tab(s) orally once a day  Eliquis 5 mg oral tablet: 1 tab(s) orally every 12 hours  ferrous sulfate 324 mg (65 mg elemental iron) oral tablet: orally every 48 hours  furosemide 20 mg oral tablet: 1 tab(s) orally every other day  senna leaf extract oral tablet: 2 tab(s) orally once a day (at bedtime)   acetaminophen 325 mg oral tablet: 2 tab(s) orally every 6 hours as needed for Moderate Pain (4 - 6)  atorvastatin 40 mg oral tablet: 1 tab(s) orally once a day  Eliquis 5 mg oral tablet: 1 tab(s) orally every 12 hours  ferrous sulfate 324 mg (65 mg elemental iron) oral tablet: orally every 48 hours  furosemide 20 mg oral tablet: 1 tab(s) orally every other day  Potassium Chloride (Eqv-Klor-Con 10) 10 mEq oral tablet, extended release: 1 tab(s) orally once a day take 1 only with water pill  senna leaf extract oral tablet: 2 tab(s) orally once a day (at bedtime)   acetaminophen 325 mg oral tablet: 2 tab(s) orally every 6 hours as needed for Moderate Pain (4 - 6)  atorvastatin 40 mg oral tablet: 1 tab(s) orally once a day  Eliquis 5 mg oral tablet: 1 tab(s) orally every 12 hours  ferrous sulfate 324 mg (65 mg elemental iron) oral tablet: orally every 48 hours  furosemide 20 mg oral tablet: 1 tab(s) orally every other day  irbesartan 75 mg oral tablet: 1 tab(s) orally once a day  Potassium Chloride (Eqv-Klor-Con 10) 10 mEq oral tablet, extended release: 1 tab(s) orally once a day take 1 only with water pill  senna leaf extract oral tablet: 2 tab(s) orally once a day (at bedtime)   acetaminophen 325 mg oral tablet: 2 tab(s) orally every 6 hours as needed for Moderate Pain (4 - 6)  atorvastatin 40 mg oral tablet: 1 tab(s) orally once a day  Eliquis 5 mg oral tablet: 1 tab(s) orally every 12 hours  ferrous sulfate 324 mg (65 mg elemental iron) oral tablet: orally every 48 hours  furosemide 20 mg oral tablet: 1 tab(s) orally every other day  irbesartan 75 mg oral tablet: 1 tab(s) orally once a day  Potassium Chloride (Eqv-Klor-Con 10) 10 mEq oral tablet, extended release: 1 tab(s) orally once a day take 1 only with water pill  senna leaf extract oral tablet: 2 tab(s) orally once a day (at bedtime)  Toprol-XL 50 mg oral tablet, extended release: 1 tab(s) orally once a day

## 2025-06-18 NOTE — PHYSICAL THERAPY INITIAL EVALUATION ADULT - GENERAL OBSERVATIONS, REHAB EVAL
Patient received seated in bedside chair NAD +EKG +heplock +a-line (disconnected by TOÑO Vanegas) +b/l groin sites and R wrist dressing c/d/i

## 2025-06-18 NOTE — PHYSICAL THERAPY INITIAL EVALUATION ADULT - PERTINENT HX OF CURRENT PROBLEM, REHAB EVAL
89 year old male with family history of CAD and a past medical history of hypertension, hyperlipidemia, Atrial Fibrillation (currently on Eliquis, transitioned to Lovenox Preop), PPM ([facility] in 2018 followed by Dr. Galvez), Thoracic aortic aneurysm, history of Prostate CA, chronic kidney disease (creatine 1.21), chronic diastolic heart failure with severe aortic stenosis (PV 4.11 MG 43). 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

## 2025-06-18 NOTE — PHYSICAL THERAPY INITIAL EVALUATION ADULT - ADDITIONAL COMMENTS
Patient lives with his wife in an elevator accessible apartment. Patient notes his daughter lives in the same building. PTA he was independent with all ADLs/IADLs with no AD use.

## 2025-06-18 NOTE — DISCHARGE NOTE PROVIDER - NSDCFUSCHEDAPPT_GEN_ALL_CORE_FT
Suzie Ashley  [facility]  CTSURG [address]  Scheduled Appointment: 06/27/2025    Victoriano Puente  [facility]  INTMED [address]  Scheduled Appointment: 08/07/2025

## 2025-06-19 ENCOUNTER — APPOINTMENT (OUTPATIENT)
Dept: CARE COORDINATION | Facility: HOME HEALTH | Age: 89
End: 2025-06-19

## 2025-06-19 VITALS
WEIGHT: 163 LBS | BODY MASS INDEX: 21.51 KG/M2 | SYSTOLIC BLOOD PRESSURE: 127 MMHG | HEART RATE: 65 BPM | OXYGEN SATURATION: 97 % | RESPIRATION RATE: 18 BRPM | DIASTOLIC BLOOD PRESSURE: 72 MMHG

## 2025-06-19 RX ORDER — POTASSIUM CHLORIDE 750 MG/1
10 TABLET, FILM COATED, EXTENDED RELEASE ORAL EVERY OTHER DAY
Refills: 0 | Status: ACTIVE | COMMUNITY

## 2025-06-19 RX ORDER — FUROSEMIDE 20 MG/1
20 TABLET ORAL EVERY OTHER DAY
Refills: 0 | Status: ACTIVE | COMMUNITY

## 2025-06-19 RX ORDER — PANTOPRAZOLE SODIUM 40 MG/1
40 TABLET, DELAYED RELEASE ORAL
Qty: 30 | Refills: 0 | Status: ACTIVE | COMMUNITY

## 2025-06-23 NOTE — CHART NOTE - NSCHARTNOTEFT_GEN_A_CORE
Post-Discharge Medication Review: Completed	  	  Patient's preferred pharmacy was updated in [facility]: [facility]	  	  Patient contacted to offer medication counseling post-discharge. Medication reconciliation completed. Per patient, medications include:	  	  1.	acetaminophen 325 mg oral tablet 2 tab(s) orally every 6 hours as needed for Moderate Pain (4 - 6)  2.	atorvastatin 40 mg oral tablet 1 tab(s) orally once a day  3.	Eliquis 5 mg oral tablet 1 tab(s) orally every 12 hours  4.	ferrous sulfate 324 mg (65 mg elemental iron) oral tablet orally every 48 hours  5.	furosemide 20 mg oral tablet 1 tab(s) orally every other day  6.	irbesartan 75 mg oral tablet 1 tab(s) orally once a day (***PATIENT NOT TAKING, WILL CLARIFY WITH PROVIDER IF SHOULD BE TAKING***)  7.	Potassium Chloride (Eqv-Klor-Con 10) 10 mEq oral tablet, extended release 1 tab(s) orally once a day take 1 only with water pill  8.	senna leaf extract oral tablet 2 tab(s) orally once a day (at bedtime)  9.	Toprol-XL 50 mg oral tablet, extended release 1 tab(s) orally once a day (***PATIENT NOT TAKING, WILL CLARIFY WITH PROVIDER IF SHOULD BE TAKING***)  	  Medication name, indication, administration, side effect, and monitoring reviewed for new medications during post discharge counseling visit with patient. Patient demonstrated understanding. Counseling offered for all medications.	  	  Patient's discharge paperwork from the hospital stated to stop taking irbesartan and metoprolol, however it seems that the discharge papers were later updated to say to continue taking these medications, and new prescriptions were sent to [facility]. Patient has not been taking these medications. Spoke to the patient again on 6/24 - visiting nurse was present with him and stated that patient was going to an appointment with the CT surgery team this afternoon and will clarify whether he should be taking these two medications. Informed inpatient team as well.    Jeannette Wallace, [place]	  Clinical Pharmacy Specialist, Pharmacy Telehealth Team	  Can be reached via MS Teams or [phone]

## 2025-06-24 ENCOUNTER — APPOINTMENT (OUTPATIENT)
Dept: CARDIOTHORACIC SURGERY | Facility: CLINIC | Age: 89
End: 2025-06-24
Payer: MEDICARE

## 2025-06-24 ENCOUNTER — TRANSCRIPTION ENCOUNTER (OUTPATIENT)
Age: 89
End: 2025-06-24

## 2025-06-24 ENCOUNTER — APPOINTMENT (OUTPATIENT)
Dept: CT IMAGING | Facility: HOSPITAL | Age: 89
End: 2025-06-24

## 2025-06-24 ENCOUNTER — APPOINTMENT (OUTPATIENT)
Dept: VASCULAR SURGERY | Facility: CLINIC | Age: 89
End: 2025-06-24
Payer: MEDICARE

## 2025-06-24 VITALS
OXYGEN SATURATION: 99 % | HEART RATE: 61 BPM | HEIGHT: 73 IN | DIASTOLIC BLOOD PRESSURE: 81 MMHG | SYSTOLIC BLOOD PRESSURE: 139 MMHG | RESPIRATION RATE: 16 BRPM | BODY MASS INDEX: 20.41 KG/M2 | WEIGHT: 154 LBS | TEMPERATURE: 98.1 F

## 2025-06-24 PROCEDURE — 99214 OFFICE O/P EST MOD 30 MIN: CPT

## 2025-06-24 PROCEDURE — 93931 UPPER EXTREMITY STUDY: CPT

## 2025-06-26 DIAGNOSIS — Z95.818 PRESENCE OF OTHER CARDIAC IMPLANTS AND GRAFTS: ICD-10-CM

## 2025-06-26 DIAGNOSIS — I50.32 CHRONIC DIASTOLIC (CONGESTIVE) HEART FAILURE: ICD-10-CM

## 2025-06-26 DIAGNOSIS — I71.20 THORACIC AORTIC ANEURYSM, WITHOUT RUPTURE, UNSPECIFIED: ICD-10-CM

## 2025-06-26 DIAGNOSIS — I73.9 PERIPHERAL VASCULAR DISEASE, UNSPECIFIED: ICD-10-CM

## 2025-06-26 DIAGNOSIS — Z85.46 PERSONAL HISTORY OF MALIGNANT NEOPLASM OF PROSTATE: ICD-10-CM

## 2025-06-26 DIAGNOSIS — I35.0 NONRHEUMATIC AORTIC (VALVE) STENOSIS: ICD-10-CM

## 2025-06-26 DIAGNOSIS — Z00.6 ENCOUNTER FOR EXAMINATION FOR NORMAL COMPARISON AND CONTROL IN CLINICAL RESEARCH PROGRAM: ICD-10-CM

## 2025-06-26 DIAGNOSIS — Z79.01 LONG TERM (CURRENT) USE OF ANTICOAGULANTS: ICD-10-CM

## 2025-06-26 DIAGNOSIS — I48.20 CHRONIC ATRIAL FIBRILLATION, UNSPECIFIED: ICD-10-CM

## 2025-06-26 DIAGNOSIS — E78.5 HYPERLIPIDEMIA, UNSPECIFIED: ICD-10-CM

## 2025-06-26 DIAGNOSIS — Z95.0 PRESENCE OF CARDIAC PACEMAKER: ICD-10-CM

## 2025-06-26 DIAGNOSIS — Z86.12 PERSONAL HISTORY OF POLIOMYELITIS: ICD-10-CM

## 2025-06-27 ENCOUNTER — APPOINTMENT (OUTPATIENT)
Dept: CARDIOTHORACIC SURGERY | Facility: CLINIC | Age: 89
End: 2025-06-27

## 2025-07-03 ENCOUNTER — APPOINTMENT (OUTPATIENT)
Dept: CARDIOTHORACIC SURGERY | Facility: CLINIC | Age: 89
End: 2025-07-03
Payer: MEDICARE

## 2025-07-03 VITALS
OXYGEN SATURATION: 97 % | DIASTOLIC BLOOD PRESSURE: 66 MMHG | SYSTOLIC BLOOD PRESSURE: 117 MMHG | WEIGHT: 152 LBS | BODY MASS INDEX: 20.15 KG/M2 | HEART RATE: 60 BPM | TEMPERATURE: 97.3 F | HEIGHT: 73 IN

## 2025-07-03 PROCEDURE — 99211 OFF/OP EST MAY X REQ PHY/QHP: CPT | Mod: 24

## 2025-07-08 ENCOUNTER — APPOINTMENT (OUTPATIENT)
Dept: HEART AND VASCULAR | Facility: CLINIC | Age: 89
End: 2025-07-08
Payer: MEDICARE

## 2025-07-08 VITALS
HEIGHT: 73 IN | DIASTOLIC BLOOD PRESSURE: 75 MMHG | SYSTOLIC BLOOD PRESSURE: 122 MMHG | OXYGEN SATURATION: 98 % | BODY MASS INDEX: 20.41 KG/M2 | TEMPERATURE: 97.2 F | HEART RATE: 70 BPM | WEIGHT: 154 LBS

## 2025-07-08 PROCEDURE — 93000 ELECTROCARDIOGRAM COMPLETE: CPT

## 2025-07-08 PROCEDURE — 99214 OFFICE O/P EST MOD 30 MIN: CPT | Mod: 25

## 2025-07-08 RX ORDER — ENOXAPARIN SODIUM 80 MG/.8ML
80 INJECTION, SOLUTION SUBCUTANEOUS
Qty: 3 | Refills: 0 | Status: COMPLETED | COMMUNITY
Start: 2025-04-14

## 2025-07-08 RX ORDER — SENNOSIDES 8.6 MG/1
CAPSULE, GELATIN COATED ORAL
Refills: 0 | Status: ACTIVE | COMMUNITY

## 2025-07-09 PROBLEM — Z95.2 S/P TAVR (TRANSCATHETER AORTIC VALVE REPLACEMENT): Status: ACTIVE | Noted: 2025-06-19

## 2025-07-10 ENCOUNTER — APPOINTMENT (OUTPATIENT)
Dept: VASCULAR SURGERY | Facility: CLINIC | Age: 89
End: 2025-07-10
Payer: MEDICARE

## 2025-07-10 ENCOUNTER — NON-APPOINTMENT (OUTPATIENT)
Age: 89
End: 2025-07-10

## 2025-07-10 ENCOUNTER — APPOINTMENT (OUTPATIENT)
Dept: HEART AND VASCULAR | Facility: CLINIC | Age: 89
End: 2025-07-10
Payer: MEDICARE

## 2025-07-10 VITALS
WEIGHT: 156 LBS | SYSTOLIC BLOOD PRESSURE: 148 MMHG | HEART RATE: 60 BPM | DIASTOLIC BLOOD PRESSURE: 84 MMHG | HEIGHT: 73 IN | BODY MASS INDEX: 20.67 KG/M2

## 2025-07-10 PROBLEM — R20.0 HAND NUMBNESS: Status: ACTIVE | Noted: 2025-07-10

## 2025-07-10 PROCEDURE — 99205 OFFICE O/P NEW HI 60 MIN: CPT

## 2025-07-10 PROCEDURE — 36415 COLL VENOUS BLD VENIPUNCTURE: CPT

## 2025-07-11 ENCOUNTER — NON-APPOINTMENT (OUTPATIENT)
Age: 89
End: 2025-07-11

## 2025-07-11 LAB
ANION GAP SERPL CALC-SCNC: 11 MMOL/L
BUN SERPL-MCNC: 18 MG/DL
CALCIUM SERPL-MCNC: 9.6 MG/DL
CHLORIDE SERPL-SCNC: 104 MMOL/L
CO2 SERPL-SCNC: 25 MMOL/L
CREAT SERPL-MCNC: 1.34 MG/DL
EGFRCR SERPLBLD CKD-EPI 2021: 51 ML/MIN/1.73M2
GLUCOSE SERPL-MCNC: 71 MG/DL
POTASSIUM SERPL-SCNC: 4.9 MMOL/L
SODIUM SERPL-SCNC: 140 MMOL/L

## 2025-07-21 ENCOUNTER — RX RENEWAL (OUTPATIENT)
Age: 89
End: 2025-07-21

## 2025-07-28 ENCOUNTER — APPOINTMENT (OUTPATIENT)
Dept: CARDIOTHORACIC SURGERY | Facility: CLINIC | Age: 89
End: 2025-07-28
Payer: MEDICARE

## 2025-07-28 ENCOUNTER — RESULT REVIEW (OUTPATIENT)
Age: 89
End: 2025-07-28

## 2025-07-28 ENCOUNTER — OUTPATIENT (OUTPATIENT)
Dept: OUTPATIENT SERVICES | Facility: HOSPITAL | Age: 89
LOS: 1 days | End: 2025-07-28
Payer: MEDICARE

## 2025-07-28 VITALS
WEIGHT: 155 LBS | HEIGHT: 73 IN | BODY MASS INDEX: 20.54 KG/M2 | OXYGEN SATURATION: 97 % | SYSTOLIC BLOOD PRESSURE: 147 MMHG | HEART RATE: 65 BPM | DIASTOLIC BLOOD PRESSURE: 76 MMHG | TEMPERATURE: 96.9 F

## 2025-07-28 DIAGNOSIS — Z95.2 PRESENCE OF PROSTHETIC HEART VALVE: ICD-10-CM

## 2025-07-28 DIAGNOSIS — I35.0 NONRHEUMATIC AORTIC (VALVE) STENOSIS: ICD-10-CM

## 2025-07-28 DIAGNOSIS — Z95.0 PRESENCE OF CARDIAC PACEMAKER: Chronic | ICD-10-CM

## 2025-07-28 DIAGNOSIS — Z98.890 OTHER SPECIFIED POSTPROCEDURAL STATES: Chronic | ICD-10-CM

## 2025-07-28 PROCEDURE — 99214 OFFICE O/P EST MOD 30 MIN: CPT

## 2025-07-28 PROCEDURE — 93306 TTE W/DOPPLER COMPLETE: CPT | Mod: 26

## 2025-07-28 PROCEDURE — 93306 TTE W/DOPPLER COMPLETE: CPT

## 2025-07-28 PROCEDURE — 93010 ELECTROCARDIOGRAM REPORT: CPT

## 2025-07-28 PROCEDURE — 93005 ELECTROCARDIOGRAM TRACING: CPT

## 2025-08-04 ENCOUNTER — APPOINTMENT (OUTPATIENT)
Dept: NEUROLOGY | Facility: CLINIC | Age: 89
End: 2025-08-04

## (undated) DEVICE — PACK OPEN HEART LNX

## (undated) DEVICE — TOOL INSERTION ANGIOPLASTY PACKS

## (undated) DEVICE — SUT VICRYL 4-0 18" PS-2 UNDYED

## (undated) DEVICE — SUT SILK 2-0 18" SH (POP-OFF)

## (undated) DEVICE — SUT VICRYL 1 36" CTX UNDYED

## (undated) DEVICE — SUT PLEDGET 9MM X 4MM X 1.5MM

## (undated) DEVICE — ELCTR ZOLL DEFIBRILLATOR PAD NO REPLACEMENT

## (undated) DEVICE — CATH CV TRAY INSR ST UNIV

## (undated) DEVICE — SUT TICRON 2-0 36" CV-316 DA

## (undated) DEVICE — MANIFOLD ANGIO AUTOMD TRNDCR

## (undated) DEVICE — POSITIONER FOAM EGG CRATE ULNAR 2PCS (PINK)

## (undated) DEVICE — SUT STAINLESS STEEL 6 4-18" CCS

## (undated) DEVICE — SUT VICRYL 2-0 27" CT-1

## (undated) DEVICE — PACK PROC CV DRAPE

## (undated) DEVICE — SUT SILK 5-0 60" TIES

## (undated) DEVICE — DRSG MEPILEX 10 X 25CM (4 X 10") AG

## (undated) DEVICE — SUT PLEDGET SOFT MEDIUM 1/4" X 1/8" X 1/16" X6

## (undated) DEVICE — NDL PERCU ECHOTIP 21G X 4CM

## (undated) DEVICE — STOPCOCK SINGLE

## (undated) DEVICE — WARMING BLANKET FULL UNDERBODY

## (undated) DEVICE — SYR MED A2000 SYRINGE KIT ACIST REUS

## (undated) DEVICE — SYS DEL CONTROLLER ANGIOTOUCH

## (undated) DEVICE — TUBING SUCTION NONCONDUCTIVE 6MM X 12FT

## (undated) DEVICE — PACING CABLE (BROWN) A/V TEMP SCREW DOWN 12FT

## (undated) DEVICE — CATH NG SALEM SUMP 16FR

## (undated) DEVICE — RIGID ADULT SUCKER

## (undated) DEVICE — SUT PROLENE 3-0 36" SH-1

## (undated) DEVICE — SUT ETHIBOND 3-0 36" RB-1

## (undated) DEVICE — DRAPE SURGICAL #1010

## (undated) DEVICE — SUT PROLENE 6-0 30" RB-2

## (undated) DEVICE — TOURNIQUET SET 12FR (1 RED, 1 BLUE, 3 CLEAR, 1 SNARE) 7"

## (undated) DEVICE — FOLEY TRAY 16FR 5CC LF LUBRISIL ADVANCE TEMP CLOSED

## (undated) DEVICE — DRAPE SMALL W ADHESIVE APERTURE

## (undated) DEVICE — HEMOSTASIS VALVE PHD SM BORE W/ SPRING METAL INSERTION TOOL AND TORQUE DEVICE

## (undated) DEVICE — DRAPE SLUSH / WARMER 44 X 66"

## (undated) DEVICE — BAND RADIAL COMPRESSION DEVICE REG 24CM

## (undated) DEVICE — MARKING PEN W RULER

## (undated) DEVICE — CHEST DRAIN PLEUR-EVAC DRY/WET ADULT-PEDS SINGLE (QUICK)

## (undated) DEVICE — DRAPE IOBAN 33" X 23"

## (undated) DEVICE — Device

## (undated) DEVICE — DRSG TRACH DRAINAGE 4X4

## (undated) DEVICE — SUT PROLENE 4-0 36" RB-1

## (undated) DEVICE — SUT VICRYL 0 27" CT

## (undated) DEVICE — SUT HOLDER INSERT FOR OCTOBASE STERNAL RETRACTOR

## (undated) DEVICE — DRAPE OR CAMERA COVER

## (undated) DEVICE — PACK HYBRID LHH

## (undated) DEVICE — HEMOSTAT KELLY CURVED DISP

## (undated) DEVICE — PREP SCRUB BRUSH W CHG 4%

## (undated) DEVICE — SUT DOUBLE 6 WIRE STERNAL

## (undated) DEVICE — TUBING EXTENSION HI PRESSURE FLEX 48"

## (undated) DEVICE — SUT NUROLON 1 18" OS-8 (POP-OFF)

## (undated) DEVICE — ULTRASOUND COVER PROBE 14 X 122CM

## (undated) DEVICE — DRSG QUICKCLOT HEMOSTATIC 4X4 FOIL

## (undated) DEVICE — SUT STAINLESS STEEL 7 4-18" CCS

## (undated) DEVICE — DRAPE PROBE COVER 5" X 96"

## (undated) DEVICE — SUMP INTRACARDIAC/PERICARDIAL 20FR 1/4" ADULT

## (undated) DEVICE — CATH CARDIAC RT CUSET 601201319

## (undated) DEVICE — DRSG BIOPATCH DISK W CHG 1" W 4.0MM HOLE

## (undated) DEVICE — CATH CARDIAC LT CUST 601201318